# Patient Record
Sex: FEMALE | Race: WHITE | NOT HISPANIC OR LATINO | Employment: OTHER | URBAN - METROPOLITAN AREA
[De-identification: names, ages, dates, MRNs, and addresses within clinical notes are randomized per-mention and may not be internally consistent; named-entity substitution may affect disease eponyms.]

---

## 2017-01-16 RX ORDER — FLUTICASONE PROPIONATE 50 MCG
SPRAY, SUSPENSION (ML) NASAL
Qty: 48 G | Refills: 0 | Status: SHIPPED | OUTPATIENT
Start: 2017-01-16 | End: 2021-10-12 | Stop reason: SDUPTHER

## 2018-03-08 ENCOUNTER — CONVERSION ENCOUNTER (OUTPATIENT)
Dept: FAMILY MEDICINE CLINIC | Facility: CLINIC | Age: 53
End: 2018-03-08

## 2018-03-08 ENCOUNTER — OFFICE VISIT CONVERTED (OUTPATIENT)
Dept: FAMILY MEDICINE CLINIC | Facility: CLINIC | Age: 53
End: 2018-03-08
Attending: FAMILY MEDICINE

## 2018-04-12 ENCOUNTER — OFFICE VISIT CONVERTED (OUTPATIENT)
Dept: FAMILY MEDICINE CLINIC | Facility: CLINIC | Age: 53
End: 2018-04-12
Attending: FAMILY MEDICINE

## 2018-04-26 ENCOUNTER — OFFICE VISIT CONVERTED (OUTPATIENT)
Dept: FAMILY MEDICINE CLINIC | Facility: CLINIC | Age: 53
End: 2018-04-26
Attending: FAMILY MEDICINE

## 2018-04-26 ENCOUNTER — OFFICE VISIT CONVERTED (OUTPATIENT)
Dept: OTOLARYNGOLOGY | Facility: CLINIC | Age: 53
End: 2018-04-26
Attending: OTOLARYNGOLOGY

## 2018-04-26 ENCOUNTER — CONVERSION ENCOUNTER (OUTPATIENT)
Dept: OTOLARYNGOLOGY | Facility: CLINIC | Age: 53
End: 2018-04-26

## 2018-04-26 ENCOUNTER — CONVERSION ENCOUNTER (OUTPATIENT)
Dept: FAMILY MEDICINE CLINIC | Facility: CLINIC | Age: 53
End: 2018-04-26

## 2018-05-08 ENCOUNTER — OFFICE VISIT CONVERTED (OUTPATIENT)
Dept: PODIATRY | Facility: CLINIC | Age: 53
End: 2018-05-08
Attending: PODIATRIST

## 2018-05-31 ENCOUNTER — OFFICE VISIT CONVERTED (OUTPATIENT)
Dept: FAMILY MEDICINE CLINIC | Facility: CLINIC | Age: 53
End: 2018-05-31
Attending: FAMILY MEDICINE

## 2018-05-31 ENCOUNTER — CONVERSION ENCOUNTER (OUTPATIENT)
Dept: FAMILY MEDICINE CLINIC | Facility: CLINIC | Age: 53
End: 2018-05-31

## 2018-06-28 ENCOUNTER — OFFICE VISIT CONVERTED (OUTPATIENT)
Dept: FAMILY MEDICINE CLINIC | Facility: CLINIC | Age: 53
End: 2018-06-28
Attending: FAMILY MEDICINE

## 2018-09-24 ENCOUNTER — CONVERSION ENCOUNTER (OUTPATIENT)
Dept: FAMILY MEDICINE CLINIC | Facility: CLINIC | Age: 53
End: 2018-09-24

## 2018-09-24 ENCOUNTER — OFFICE VISIT CONVERTED (OUTPATIENT)
Dept: FAMILY MEDICINE CLINIC | Facility: CLINIC | Age: 53
End: 2018-09-24
Attending: FAMILY MEDICINE

## 2018-10-23 ENCOUNTER — OFFICE VISIT CONVERTED (OUTPATIENT)
Dept: FAMILY MEDICINE CLINIC | Facility: CLINIC | Age: 53
End: 2018-10-23
Attending: NURSE PRACTITIONER

## 2018-10-25 ENCOUNTER — OFFICE VISIT CONVERTED (OUTPATIENT)
Dept: FAMILY MEDICINE CLINIC | Facility: CLINIC | Age: 53
End: 2018-10-25
Attending: FAMILY MEDICINE

## 2018-10-25 ENCOUNTER — CONVERSION ENCOUNTER (OUTPATIENT)
Dept: OTHER | Facility: HOSPITAL | Age: 53
End: 2018-10-25

## 2018-11-07 ENCOUNTER — OFFICE VISIT CONVERTED (OUTPATIENT)
Dept: ORTHOPEDIC SURGERY | Facility: CLINIC | Age: 53
End: 2018-11-07
Attending: ORTHOPAEDIC SURGERY

## 2018-11-07 ENCOUNTER — CONVERSION ENCOUNTER (OUTPATIENT)
Dept: ORTHOPEDIC SURGERY | Facility: CLINIC | Age: 53
End: 2018-11-07

## 2018-11-26 ENCOUNTER — TRANSCRIBE ORDERS (OUTPATIENT)
Dept: ADMINISTRATIVE | Facility: HOSPITAL | Age: 53
End: 2018-11-26

## 2018-11-26 DIAGNOSIS — R91.1 PULMONARY NODULE: Primary | ICD-10-CM

## 2018-12-12 ENCOUNTER — OFFICE VISIT CONVERTED (OUTPATIENT)
Dept: ORTHOPEDIC SURGERY | Facility: CLINIC | Age: 53
End: 2018-12-12
Attending: ORTHOPAEDIC SURGERY

## 2018-12-12 ENCOUNTER — CONVERSION ENCOUNTER (OUTPATIENT)
Dept: ORTHOPEDIC SURGERY | Facility: CLINIC | Age: 53
End: 2018-12-12

## 2019-01-08 ENCOUNTER — HOSPITAL ENCOUNTER (OUTPATIENT)
Dept: OTHER | Facility: HOSPITAL | Age: 54
Discharge: HOME | End: 2019-01-08
Attending: ORTHOPAEDIC SURGERY

## 2019-01-11 ENCOUNTER — CONVERSION ENCOUNTER (OUTPATIENT)
Dept: ORTHOPEDIC SURGERY | Facility: CLINIC | Age: 54
End: 2019-01-11

## 2019-01-11 ENCOUNTER — OFFICE VISIT CONVERTED (OUTPATIENT)
Dept: ORTHOPEDIC SURGERY | Facility: CLINIC | Age: 54
End: 2019-01-11
Attending: ORTHOPAEDIC SURGERY

## 2019-01-18 ENCOUNTER — APPOINTMENT (OUTPATIENT)
Dept: CT IMAGING | Facility: HOSPITAL | Age: 54
End: 2019-01-18
Attending: INTERNAL MEDICINE

## 2019-01-21 ENCOUNTER — HOSPITAL ENCOUNTER (OUTPATIENT)
Dept: CT IMAGING | Facility: HOSPITAL | Age: 54
Discharge: HOME | End: 2019-01-21
Attending: INTERNAL MEDICINE | Admitting: INTERNAL MEDICINE

## 2019-01-21 DIAGNOSIS — R91.1 PULMONARY NODULE: ICD-10-CM

## 2019-01-21 PROCEDURE — 71250 CT THORAX DX C-: CPT

## 2019-01-28 ENCOUNTER — TRANSCRIBE ORDERS (OUTPATIENT)
Dept: ADMINISTRATIVE | Facility: HOSPITAL | Age: 54
End: 2019-01-28

## 2019-01-28 DIAGNOSIS — R91.8 PULMONARY NODULES: Primary | ICD-10-CM

## 2019-03-17 ENCOUNTER — HOSPITAL ENCOUNTER (OUTPATIENT)
Dept: URGENT CARE | Facility: CLINIC | Age: 54
Discharge: HOME | End: 2019-03-17

## 2019-04-16 ENCOUNTER — HOSPITAL ENCOUNTER (OUTPATIENT)
Dept: FAMILY MEDICINE CLINIC | Facility: CLINIC | Age: 54
Discharge: HOME | End: 2019-04-16
Attending: FAMILY MEDICINE

## 2019-04-16 ENCOUNTER — CONVERSION ENCOUNTER (OUTPATIENT)
Dept: FAMILY MEDICINE CLINIC | Facility: CLINIC | Age: 54
End: 2019-04-16

## 2019-04-16 ENCOUNTER — OFFICE VISIT CONVERTED (OUTPATIENT)
Dept: FAMILY MEDICINE CLINIC | Facility: CLINIC | Age: 54
End: 2019-04-16
Attending: FAMILY MEDICINE

## 2019-04-16 LAB
ALBUMIN SERPL-MCNC: 4.9 G/DL (ref 3.5–5)
ALBUMIN/GLOB SERPL: 1.6 {RATIO} (ref 1.4–2.6)
ALP SERPL-CCNC: 105 U/L (ref 53–141)
ALT SERPL-CCNC: 43 U/L (ref 10–40)
ANION GAP SERPL CALC-SCNC: 21 MMOL/L (ref 8–19)
AST SERPL-CCNC: 37 U/L (ref 15–50)
BASOPHILS # BLD AUTO: 0.03 10*3/UL (ref 0–0.2)
BASOPHILS NFR BLD AUTO: 0.4 % (ref 0–3)
BILIRUB SERPL-MCNC: 0.63 MG/DL (ref 0.2–1.3)
BUN SERPL-MCNC: 10 MG/DL (ref 5–25)
BUN/CREAT SERPL: 11 {RATIO} (ref 6–20)
CALCIUM SERPL-MCNC: 9.7 MG/DL (ref 8.7–10.4)
CHLORIDE SERPL-SCNC: 102 MMOL/L (ref 99–111)
CHOLEST SERPL-MCNC: 217 MG/DL (ref 107–200)
CHOLEST/HDLC SERPL: 4.5 {RATIO} (ref 3–6)
CONV ABS IMM GRAN: 0.03 10*3/UL (ref 0–0.2)
CONV CO2: 25 MMOL/L (ref 22–32)
CONV IMMATURE GRAN: 0.4 % (ref 0–1.8)
CONV TOTAL PROTEIN: 7.9 G/DL (ref 6.3–8.2)
CREAT UR-MCNC: 0.9 MG/DL (ref 0.5–0.9)
DEPRECATED RDW RBC AUTO: 46.9 FL (ref 36.4–46.3)
EOSINOPHIL # BLD AUTO: 0.13 10*3/UL (ref 0–0.7)
EOSINOPHIL # BLD AUTO: 1.6 % (ref 0–7)
ERYTHROCYTE [DISTWIDTH] IN BLOOD BY AUTOMATED COUNT: 14.5 % (ref 11.7–14.4)
EST. AVERAGE GLUCOSE BLD GHB EST-MCNC: 137 MG/DL
FOLATE SERPL-MCNC: 11.7 NG/ML (ref 4.8–20)
GFR SERPLBLD BASED ON 1.73 SQ M-ARVRAT: >60 ML/MIN/{1.73_M2}
GLOBULIN UR ELPH-MCNC: 3 G/DL (ref 2–3.5)
GLUCOSE SERPL-MCNC: 112 MG/DL (ref 65–99)
HBA1C MFR BLD: 13.7 G/DL (ref 12–16)
HBA1C MFR BLD: 6.4 % (ref 3.5–5.7)
HCT VFR BLD AUTO: 42.7 % (ref 37–47)
HDLC SERPL-MCNC: 48 MG/DL (ref 40–60)
IRON SATN MFR SERPL: 15 % (ref 20–55)
IRON SERPL-MCNC: 74 UG/DL (ref 60–170)
LDLC SERPL CALC-MCNC: 115 MG/DL (ref 70–100)
LYMPHOCYTES # BLD AUTO: 1.11 10*3/UL (ref 1–5)
MAGNESIUM SERPL-MCNC: 1.93 MG/DL (ref 1.6–2.3)
MCH RBC QN AUTO: 28.5 PG (ref 27–31)
MCHC RBC AUTO-ENTMCNC: 32.1 G/DL (ref 33–37)
MCV RBC AUTO: 89 FL (ref 81–99)
MONOCYTES # BLD AUTO: 0.6 10*3/UL (ref 0.2–1.2)
MONOCYTES NFR BLD AUTO: 7.4 % (ref 3–10)
NEUTROPHILS # BLD AUTO: 6.19 10*3/UL (ref 2–8)
NEUTROPHILS NFR BLD AUTO: 76.5 % (ref 30–85)
NRBC CBCN: 0 % (ref 0–0.7)
OSMOLALITY SERPL CALC.SUM OF ELEC: 298 MOSM/KG (ref 273–304)
PLATELET # BLD AUTO: 237 10*3/UL (ref 130–400)
PMV BLD AUTO: 10.1 FL (ref 9.4–12.3)
POTASSIUM SERPL-SCNC: 3.6 MMOL/L (ref 3.5–5.3)
RBC # BLD AUTO: 4.8 10*6/UL (ref 4.2–5.4)
SODIUM SERPL-SCNC: 144 MMOL/L (ref 135–147)
T4 FREE SERPL-MCNC: 1.3 NG/DL (ref 0.9–1.8)
TIBC SERPL-MCNC: 499 UG/DL (ref 245–450)
TRANSFERRIN SERPL-MCNC: 349 MG/DL (ref 250–380)
TRIGL SERPL-MCNC: 271 MG/DL (ref 40–150)
TSH SERPL-ACNC: 1.71 M[IU]/L (ref 0.27–4.2)
VARIANT LYMPHS NFR BLD MANUAL: 13.7 % (ref 20–45)
VIT B12 SERPL-MCNC: 728 PG/ML (ref 211–911)
VLDLC SERPL-MCNC: 54 MG/DL (ref 5–37)
WBC # BLD AUTO: 8.09 10*3/UL (ref 4.8–10.8)

## 2019-04-18 LAB — BACTERIA SPEC AEROBE CULT: NORMAL

## 2019-04-22 ENCOUNTER — HOSPITAL ENCOUNTER (OUTPATIENT)
Dept: CARDIOLOGY | Facility: HOSPITAL | Age: 54
Discharge: HOME | End: 2019-04-22
Attending: FAMILY MEDICINE

## 2019-05-06 ENCOUNTER — OFFICE VISIT CONVERTED (OUTPATIENT)
Dept: FAMILY MEDICINE CLINIC | Facility: CLINIC | Age: 54
End: 2019-05-06
Attending: FAMILY MEDICINE

## 2019-05-06 ENCOUNTER — HOSPITAL ENCOUNTER (OUTPATIENT)
Dept: FAMILY MEDICINE CLINIC | Facility: CLINIC | Age: 54
Discharge: HOME | End: 2019-05-06
Attending: FAMILY MEDICINE

## 2019-05-08 LAB
ALBUMIN SERPL-MCNC: 4.7 G/DL (ref 3.5–5)
ALBUMIN/GLOB SERPL: 1.6 {RATIO} (ref 1.4–2.6)
ALP SERPL-CCNC: 100 U/L (ref 53–141)
ALT SERPL-CCNC: 28 U/L (ref 10–40)
ANION GAP SERPL CALC-SCNC: 19 MMOL/L (ref 8–19)
AST SERPL-CCNC: 26 U/L (ref 15–50)
BILIRUB SERPL-MCNC: 0.45 MG/DL (ref 0.2–1.3)
BUN SERPL-MCNC: 17 MG/DL (ref 5–25)
BUN/CREAT SERPL: 21 {RATIO} (ref 6–20)
CALCIUM SERPL-MCNC: 10 MG/DL (ref 8.7–10.4)
CHLORIDE SERPL-SCNC: 102 MMOL/L (ref 99–111)
CONV CO2: 27 MMOL/L (ref 22–32)
CONV HEPATITIS B SURFACE AG W CONFIRMATION RE: NEGATIVE
CONV TOTAL PROTEIN: 7.7 G/DL (ref 6.3–8.2)
CREAT UR-MCNC: 0.81 MG/DL (ref 0.5–0.9)
GFR SERPLBLD BASED ON 1.73 SQ M-ARVRAT: >60 ML/MIN/{1.73_M2}
GLOBULIN UR ELPH-MCNC: 3 G/DL (ref 2–3.5)
GLUCOSE SERPL-MCNC: 103 MG/DL (ref 65–99)
HAV IGM SERPL QL IA: NEGATIVE
HBV CORE IGM SERPL QL IA: NEGATIVE
HCV AB SER DONR QL: <0.1 S/CO RATIO (ref 0–0.9)
OSMOLALITY SERPL CALC.SUM OF ELEC: 300 MOSM/KG (ref 273–304)
POTASSIUM SERPL-SCNC: 4.1 MMOL/L (ref 3.5–5.3)
SODIUM SERPL-SCNC: 144 MMOL/L (ref 135–147)

## 2019-08-20 ENCOUNTER — OFFICE VISIT CONVERTED (OUTPATIENT)
Dept: FAMILY MEDICINE CLINIC | Facility: CLINIC | Age: 54
End: 2019-08-20
Attending: NURSE PRACTITIONER

## 2019-08-20 ENCOUNTER — HOSPITAL ENCOUNTER (OUTPATIENT)
Dept: FAMILY MEDICINE CLINIC | Facility: CLINIC | Age: 54
Discharge: HOME | End: 2019-08-20
Attending: NURSE PRACTITIONER

## 2019-08-22 LAB — BACTERIA SPEC AEROBE CULT: NORMAL

## 2019-09-24 ENCOUNTER — OFFICE VISIT CONVERTED (OUTPATIENT)
Dept: CARDIOLOGY | Facility: CLINIC | Age: 54
End: 2019-09-24
Attending: INTERNAL MEDICINE

## 2019-10-07 ENCOUNTER — OFFICE VISIT CONVERTED (OUTPATIENT)
Dept: FAMILY MEDICINE CLINIC | Facility: CLINIC | Age: 54
End: 2019-10-07
Attending: FAMILY MEDICINE

## 2020-05-21 ENCOUNTER — OFFICE VISIT CONVERTED (OUTPATIENT)
Dept: FAMILY MEDICINE CLINIC | Facility: CLINIC | Age: 55
End: 2020-05-21
Attending: FAMILY MEDICINE

## 2020-05-21 ENCOUNTER — HOSPITAL ENCOUNTER (OUTPATIENT)
Dept: FAMILY MEDICINE CLINIC | Facility: CLINIC | Age: 55
Discharge: HOME | End: 2020-05-21
Attending: FAMILY MEDICINE

## 2020-05-21 ENCOUNTER — CONVERSION ENCOUNTER (OUTPATIENT)
Dept: FAMILY MEDICINE CLINIC | Facility: CLINIC | Age: 55
End: 2020-05-21

## 2020-05-22 LAB
ALBUMIN SERPL-MCNC: 5.3 G/DL (ref 3.5–5)
ALBUMIN/GLOB SERPL: 2.2 {RATIO} (ref 1.4–2.6)
ALP SERPL-CCNC: 77 U/L (ref 53–141)
ALT SERPL-CCNC: 61 U/L (ref 10–40)
ANION GAP SERPL CALC-SCNC: 22 MMOL/L (ref 8–19)
AST SERPL-CCNC: 61 U/L (ref 15–50)
BASOPHILS # BLD AUTO: 0.07 10*3/UL (ref 0–0.2)
BASOPHILS NFR BLD AUTO: 1 % (ref 0–3)
BILIRUB SERPL-MCNC: 0.65 MG/DL (ref 0.2–1.3)
BUN SERPL-MCNC: 8 MG/DL (ref 5–25)
BUN/CREAT SERPL: 10 {RATIO} (ref 6–20)
CALCIUM SERPL-MCNC: 10.3 MG/DL (ref 8.7–10.4)
CHLORIDE SERPL-SCNC: 102 MMOL/L (ref 99–111)
CHOLEST SERPL-MCNC: 199 MG/DL (ref 107–200)
CHOLEST/HDLC SERPL: 4.4 {RATIO} (ref 3–6)
CONV ABS IMM GRAN: 0.01 10*3/UL (ref 0–0.2)
CONV CO2: 24 MMOL/L (ref 22–32)
CONV CREATININE URINE, RANDOM: 40.2 MG/DL (ref 10–300)
CONV IMMATURE GRAN: 0.1 % (ref 0–1.8)
CONV MICROALBUM.,U,RANDOM: 24.7 MG/L (ref 0–20)
CONV TOTAL PROTEIN: 7.7 G/DL (ref 6.3–8.2)
CREAT UR-MCNC: 0.78 MG/DL (ref 0.5–0.9)
DEPRECATED RDW RBC AUTO: 56.6 FL (ref 36.4–46.3)
EOSINOPHIL # BLD AUTO: 0.29 10*3/UL (ref 0–0.7)
EOSINOPHIL # BLD AUTO: 3.9 % (ref 0–7)
ERYTHROCYTE [DISTWIDTH] IN BLOOD BY AUTOMATED COUNT: 14.6 % (ref 11.7–14.4)
EST. AVERAGE GLUCOSE BLD GHB EST-MCNC: 140 MG/DL
GFR SERPLBLD BASED ON 1.73 SQ M-ARVRAT: >60 ML/MIN/{1.73_M2}
GLOBULIN UR ELPH-MCNC: 2.4 G/DL (ref 2–3.5)
GLUCOSE SERPL-MCNC: 90 MG/DL (ref 65–99)
HBA1C MFR BLD: 6.5 % (ref 3.5–5.7)
HCT VFR BLD AUTO: 51.4 % (ref 37–47)
HDLC SERPL-MCNC: 45 MG/DL (ref 40–60)
HGB BLD-MCNC: 15.6 G/DL (ref 12–16)
LDLC SERPL CALC-MCNC: 117 MG/DL (ref 70–100)
LYMPHOCYTES # BLD AUTO: 1.34 10*3/UL (ref 1–5)
LYMPHOCYTES NFR BLD AUTO: 18.2 % (ref 20–45)
MCH RBC QN AUTO: 32.2 PG (ref 27–31)
MCHC RBC AUTO-ENTMCNC: 30.4 G/DL (ref 33–37)
MCV RBC AUTO: 106.2 FL (ref 81–99)
MICROALBUMIN/CREAT UR: 61.4 MG/G{CRE} (ref 0–35)
MONOCYTES # BLD AUTO: 0.46 10*3/UL (ref 0.2–1.2)
MONOCYTES NFR BLD AUTO: 6.3 % (ref 3–10)
NEUTROPHILS # BLD AUTO: 5.18 10*3/UL (ref 2–8)
NEUTROPHILS NFR BLD AUTO: 70.5 % (ref 30–85)
NRBC CBCN: 0 % (ref 0–0.7)
OSMOLALITY SERPL CALC.SUM OF ELEC: 296 MOSM/KG (ref 273–304)
PLATELET # BLD AUTO: 264 10*3/UL (ref 130–400)
PMV BLD AUTO: 11.2 FL (ref 9.4–12.3)
POTASSIUM SERPL-SCNC: 4.1 MMOL/L (ref 3.5–5.3)
RBC # BLD AUTO: 4.84 10*6/UL (ref 4.2–5.4)
SODIUM SERPL-SCNC: 144 MMOL/L (ref 135–147)
T4 FREE SERPL-MCNC: 1.3 NG/DL (ref 0.9–1.8)
TRIGL SERPL-MCNC: 186 MG/DL (ref 40–150)
TSH SERPL-ACNC: 1.55 M[IU]/L (ref 0.27–4.2)
VLDLC SERPL-MCNC: 37 MG/DL (ref 5–37)
WBC # BLD AUTO: 7.35 10*3/UL (ref 4.8–10.8)

## 2020-06-04 ENCOUNTER — HOSPITAL ENCOUNTER (OUTPATIENT)
Dept: OTHER | Facility: HOSPITAL | Age: 55
Discharge: HOME | End: 2020-06-04
Attending: FAMILY MEDICINE

## 2020-06-04 ENCOUNTER — OFFICE VISIT CONVERTED (OUTPATIENT)
Dept: FAMILY MEDICINE CLINIC | Facility: CLINIC | Age: 55
End: 2020-06-04
Attending: FAMILY MEDICINE

## 2020-06-04 ENCOUNTER — CONVERSION ENCOUNTER (OUTPATIENT)
Dept: FAMILY MEDICINE CLINIC | Facility: CLINIC | Age: 55
End: 2020-06-04

## 2020-07-02 ENCOUNTER — OFFICE VISIT CONVERTED (OUTPATIENT)
Dept: SURGERY | Facility: CLINIC | Age: 55
End: 2020-07-02
Attending: SURGERY

## 2020-07-16 ENCOUNTER — HOSPITAL ENCOUNTER (OUTPATIENT)
Dept: PREADMISSION TESTING | Facility: HOSPITAL | Age: 55
Discharge: HOME | End: 2020-07-16
Attending: SURGERY

## 2020-07-16 LAB — SARS-COV-2 RNA SPEC QL NAA+PROBE: NOT DETECTED

## 2020-07-17 ENCOUNTER — HOSPITAL ENCOUNTER (OUTPATIENT)
Dept: PERIOP | Facility: HOSPITAL | Age: 55
Setting detail: HOSPITAL OUTPATIENT SURGERY
Discharge: HOME | End: 2020-07-17
Attending: SURGERY

## 2020-07-17 LAB
ANION GAP SERPL CALC-SCNC: 13 MMOL/L (ref 8–19)
BUN SERPL-MCNC: 20 MG/DL (ref 5–25)
BUN/CREAT SERPL: 27 {RATIO} (ref 6–20)
CALCIUM SERPL-MCNC: 10 MG/DL (ref 8.7–10.4)
CHLORIDE SERPL-SCNC: 103 MMOL/L (ref 99–111)
CONV CO2: 28 MMOL/L (ref 22–32)
CREAT UR-MCNC: 0.73 MG/DL (ref 0.5–0.9)
GFR SERPLBLD BASED ON 1.73 SQ M-ARVRAT: >60 ML/MIN/{1.73_M2}
GLUCOSE BLD-MCNC: 135 MG/DL (ref 65–99)
GLUCOSE BLD-MCNC: 147 MG/DL (ref 65–99)
GLUCOSE SERPL-MCNC: 130 MG/DL (ref 65–99)
OSMOLALITY SERPL CALC.SUM OF ELEC: 296 MOSM/KG (ref 273–304)
POTASSIUM SERPL-SCNC: 3.2 MMOL/L (ref 3.5–5.3)
SODIUM SERPL-SCNC: 141 MMOL/L (ref 135–147)

## 2020-07-29 ENCOUNTER — HOSPITAL ENCOUNTER (OUTPATIENT)
Dept: SURGERY | Facility: CLINIC | Age: 55
Discharge: HOME | End: 2020-07-29
Attending: NURSE PRACTITIONER

## 2020-07-29 ENCOUNTER — OFFICE VISIT CONVERTED (OUTPATIENT)
Dept: SURGERY | Facility: CLINIC | Age: 55
End: 2020-07-29
Attending: NURSE PRACTITIONER

## 2020-07-31 ENCOUNTER — OFFICE VISIT CONVERTED (OUTPATIENT)
Dept: FAMILY MEDICINE CLINIC | Facility: CLINIC | Age: 55
End: 2020-07-31
Attending: FAMILY MEDICINE

## 2020-07-31 ENCOUNTER — HOSPITAL ENCOUNTER (OUTPATIENT)
Dept: FAMILY MEDICINE CLINIC | Facility: CLINIC | Age: 55
Discharge: HOME | End: 2020-07-31
Attending: FAMILY MEDICINE

## 2020-07-31 ENCOUNTER — CONVERSION ENCOUNTER (OUTPATIENT)
Dept: FAMILY MEDICINE CLINIC | Facility: CLINIC | Age: 55
End: 2020-07-31

## 2020-07-31 LAB
BACTERIA SPEC AEROBE CULT: ABNORMAL
CIPROFLOXACIN SUSC ISLT: >=8
CLINDAMYCIN SUSC ISLT: 0.25
DAPTOMYCIN SUSC ISLT: 1
DOXYCYCLINE SUSC ISLT: <=0.5
ERYTHROMYCIN SUSC ISLT: >=8
GENTAMICIN SUSC ISLT: <=0.5
LEVOFLOXACIN SUSC ISLT: 4
OXACILLIN SUSC ISLT: >=4
RIFAMPIN SUSC ISLT: <=0.5
TETRACYCLINE SUSC ISLT: <=1
TIGECYCLINE SUSC ISLT: <=0.12
TMP SMX SUSC ISLT: <=10
VANCOMYCIN SUSC ISLT: 1

## 2020-08-05 ENCOUNTER — CONVERSION ENCOUNTER (OUTPATIENT)
Dept: SURGERY | Facility: CLINIC | Age: 55
End: 2020-08-05

## 2020-08-05 ENCOUNTER — OFFICE VISIT CONVERTED (OUTPATIENT)
Dept: SURGERY | Facility: CLINIC | Age: 55
End: 2020-08-05
Attending: SURGERY

## 2020-08-19 ENCOUNTER — OFFICE VISIT CONVERTED (OUTPATIENT)
Dept: ORTHOPEDIC SURGERY | Facility: CLINIC | Age: 55
End: 2020-08-19
Attending: ORTHOPAEDIC SURGERY

## 2020-09-16 ENCOUNTER — OFFICE VISIT CONVERTED (OUTPATIENT)
Dept: ORTHOPEDIC SURGERY | Facility: CLINIC | Age: 55
End: 2020-09-16
Attending: PHYSICIAN ASSISTANT

## 2020-10-02 ENCOUNTER — OFFICE VISIT (OUTPATIENT)
Dept: NEUROLOGY | Facility: CLINIC | Age: 55
End: 2020-10-02

## 2020-10-02 VITALS — DIASTOLIC BLOOD PRESSURE: 88 MMHG | HEART RATE: 95 BPM | SYSTOLIC BLOOD PRESSURE: 155 MMHG | OXYGEN SATURATION: 98 %

## 2020-10-02 DIAGNOSIS — F33.1 MODERATE RECURRENT MAJOR DEPRESSION (HCC): Primary | ICD-10-CM

## 2020-10-02 PROCEDURE — 99214 OFFICE O/P EST MOD 30 MIN: CPT | Performed by: PSYCHIATRY & NEUROLOGY

## 2020-10-02 RX ORDER — VITAMIN E 268 MG
400 CAPSULE ORAL DAILY
COMMUNITY

## 2020-10-02 RX ORDER — DULOXETIN HYDROCHLORIDE 60 MG/1
60 CAPSULE, DELAYED RELEASE ORAL DAILY
COMMUNITY
End: 2022-01-01 | Stop reason: SDUPTHER

## 2020-10-02 RX ORDER — PANTOPRAZOLE SODIUM 40 MG/1
40 TABLET, DELAYED RELEASE ORAL 2 TIMES DAILY
COMMUNITY
End: 2021-10-12

## 2020-10-02 RX ORDER — IBUPROFEN 800 MG/1
800 TABLET ORAL EVERY 6 HOURS PRN
COMMUNITY
End: 2022-01-01

## 2020-10-02 RX ORDER — GLUCOSAMINE/D3/BOSWELLIA SERRA 1500MG-400
TABLET ORAL
COMMUNITY

## 2020-10-02 RX ORDER — OMEGA-3 FATTY ACIDS/FISH OIL 300-1000MG
1000 CAPSULE ORAL DAILY
COMMUNITY

## 2020-10-02 RX ORDER — LUBIPROSTONE 24 UG/1
24 CAPSULE ORAL 2 TIMES DAILY WITH MEALS
COMMUNITY
End: 2021-10-12 | Stop reason: SDUPTHER

## 2020-10-02 NOTE — PROGRESS NOTES
"Chief Complaint   Patient presents with   • Migraine   • Seizures       Patient ID: Lidia Yenug is a 54 y.o. female.    HPI: I had the pleasure of seeing Steven in the neurology clinic this afternoon.  As you may know she is a 54-year-old female here for history of memory issues.  She has been seen by me in the past.  This was back in 2016 or so.  At that time she was experiencing memory issues as well.  She did have neuropsych testing that we scheduled her for back then.  This did not show any evidence for dementia.  In fact her memory apparently was good.  I did review that report today.  It does show evidence for significant psychological issues.  The patient states that she does have a mental health specialist as well as a psychiatrist.  She continues to have issues related to depression.  She acknowledges a past of suicidal ideation.  She has been hospitalized for that previously.  She is able to recall multiple things in her recent and remote past during our conversation together today.  No recent head injuries.  She says that she recently moved to [place] and is now back.  She was there in order to \"get off of OxyContin\".  She says that she still experiences \"gaps in my head\".  She says that she has poor focus and concentration and she easily loses track of time.  No family history of dementia.    The following portions of the patient's history were reviewed and updated as appropriate: allergies, current medications, past family history, past medical history, past social history, past surgical history and problem list.    Review of Systems   Constitutional: Negative for activity change, appetite change and fatigue.   HENT: Negative for hearing loss, trouble swallowing and voice change.    Eyes: Negative for photophobia, redness and visual disturbance.   Respiratory: Negative for chest tightness, shortness of breath and wheezing.    Cardiovascular: Negative for chest pain, palpitations and leg swelling. "   Gastrointestinal: Negative for abdominal pain, nausea and vomiting.   Endocrine: Negative for cold intolerance, heat intolerance and polyphagia.   Musculoskeletal: Positive for gait problem. Negative for back pain and neck pain.   Skin: Negative for color change, rash and wound.   Allergic/Immunologic: Negative for environmental allergies, food allergies and immunocompromised state.   Neurological: Positive for seizures, weakness, numbness and headaches. Negative for dizziness, tremors, syncope, facial asymmetry, speech difficulty and light-headedness.   Hematological: Negative for adenopathy. Does not bruise/bleed easily.   Psychiatric/Behavioral: Positive for confusion and decreased concentration. Negative for agitation, behavioral problems, dysphoric mood, hallucinations, self-injury, sleep disturbance and suicidal ideas. The patient is nervous/anxious. The patient is not hyperactive.       I have reviewed the review of systems above performed by my medical assistant.      Vitals:    10/02/20 1458   BP: 155/88   Pulse: 95   SpO2: 98%       Neurologic Exam     Mental Status   Oriented to person, place, and time.   Registration: recalls 3 of 3 objects. Follows 3 step commands.   Attention: normal. Concentration: normal.   Speech: speech is normal   Level of consciousness: alert  Knowledge: consistent with education (No deficits found.).   Normal comprehension.     Cranial Nerves     CN II   Visual fields full to confrontation.     CN III, IV, VI   Pupils are equal, round, and reactive to light.  Extraocular motions are normal.   CN III: no CN III palsy  CN VI: no CN VI palsy  Nystagmus: none   Diplopia: none    CN V   Facial sensation intact.     CN VII   Facial expression full, symmetric.     CN VIII   CN VIII normal.     CN IX, X   CN IX normal.   CN X normal.     CN XI   CN XI normal.     CN XII   CN XII normal.     Motor Exam   Muscle bulk: normal  Right arm tone: normal  Left arm tone: normal  Right leg  tone: normal  Left leg tone: normal    Strength   Right neck flexion: 5/5  Left neck flexion: 5/5  Right neck extension: 5/5  Left neck extension: 5/5  Right deltoid: 5/5  Left deltoid: 5/5  Right biceps: 5/5  Left biceps: 5/5  Right triceps: 5/5  Left triceps: 5/5  Right wrist flexion: 5/5  Left wrist flexion: 5/5  Right wrist extension: 5/5  Left wrist extension: 5/5  Right interossei: 5/5  Left interossei: 5/5  Right abdominals: 5/5  Left abdominals: 5/5  Right iliopsoas: 5/5  Left iliopsoas: 5/5  Right quadriceps: 5/5  Left quadriceps: 5/5  Right hamstrin/5  Left hamstrin/5  Right glutei: 5/5  Left glutei: 5/5  Right anterior tibial: 5/5  Left anterior tibial: 5/5  Right posterior tibial: 5/5  Left posterior tibial: 5/5  Right peroneal: 5/5  Left peroneal: 5/5  Right gastroc: 5/5  Left gastroc: 5/5    Sensory Exam   Light touch normal.   Vibration normal.   Proprioception normal.   Pinprick normal.     Gait, Coordination, and Reflexes     Gait  Gait: normal    Coordination   Romberg: negative    Tremor   Resting tremor: absent  Intention tremor: absent    Reflexes   Right brachioradialis: 2+  Left brachioradialis: 2+  Right biceps: 2+  Left biceps: 2+  Right triceps: 2+  Left triceps: 2+  Right patellar: 2+  Left patellar: 2+  Right achilles: 2+  Left achilles: 2+  Right : 2+  Left : 2+Station is normal.       Physical Exam  Vitals signs reviewed.   Constitutional:       General: She is not in acute distress.     Appearance: She is well-developed.   HENT:      Head: Normocephalic and atraumatic.   Eyes:      Extraocular Movements: EOM normal.      Pupils: Pupils are equal, round, and reactive to light.   Neck:      Musculoskeletal: Normal range of motion.   Cardiovascular:      Rate and Rhythm: Normal rate and regular rhythm.      Heart sounds: Normal heart sounds.   Pulmonary:      Effort: Pulmonary effort is normal. No respiratory distress.      Breath sounds: Normal breath sounds.   Abdominal:       General: Bowel sounds are normal. There is no distension.      Palpations: Abdomen is soft.      Tenderness: There is no abdominal tenderness.   Musculoskeletal:         General: No deformity.   Skin:     General: Skin is warm.      Findings: No rash.   Neurological:      Mental Status: She is oriented to person, place, and time.      Coordination: Romberg Test normal.      Gait: Gait is intact.      Deep Tendon Reflexes:      Reflex Scores:       Tricep reflexes are 2+ on the right side and 2+ on the left side.       Bicep reflexes are 2+ on the right side and 2+ on the left side.       Brachioradialis reflexes are 2+ on the right side and 2+ on the left side.       Patellar reflexes are 2+ on the right side and 2+ on the left side.       Achilles reflexes are 2+ on the right side and 2+ on the left side.  Psychiatric:         Speech: Speech normal.         Judgment: Judgment normal.         Procedures    Assessment/Plan: I have assured Lidia once again that she likely is not experiencing any progressive neurocognitive issues.  We did discuss referral to a mental health specialist.  She does agree with that and so we will make the referral for her.  We will see her on an as-needed basis.       Lidia was seen today for migraine and seizures.    Diagnoses and all orders for this visit:    Moderate recurrent major depression (CMS/HCC)  -     Ambulatory Referral to Behavioral Health           Jayden Everett II, MD

## 2020-10-06 ENCOUNTER — HOSPITAL ENCOUNTER (OUTPATIENT)
Dept: OTHER | Facility: HOSPITAL | Age: 55
Setting detail: RECURRING SERIES
Discharge: HOME | End: 2020-12-02
Attending: ORTHOPAEDIC SURGERY

## 2020-11-16 ENCOUNTER — OFFICE VISIT CONVERTED (OUTPATIENT)
Dept: FAMILY MEDICINE CLINIC | Facility: CLINIC | Age: 55
End: 2020-11-16
Attending: FAMILY MEDICINE

## 2021-01-21 ENCOUNTER — HOSPITAL ENCOUNTER (OUTPATIENT)
Dept: FAMILY MEDICINE CLINIC | Facility: CLINIC | Age: 56
Discharge: HOME | End: 2021-01-21
Attending: NURSE PRACTITIONER

## 2021-01-21 ENCOUNTER — CONVERSION ENCOUNTER (OUTPATIENT)
Dept: FAMILY MEDICINE CLINIC | Facility: CLINIC | Age: 56
End: 2021-01-21

## 2021-01-21 ENCOUNTER — OFFICE VISIT CONVERTED (OUTPATIENT)
Dept: FAMILY MEDICINE CLINIC | Facility: CLINIC | Age: 56
End: 2021-01-21
Attending: NURSE PRACTITIONER

## 2021-01-21 LAB
BASOPHILS # BLD AUTO: 0.04 10*3/UL (ref 0–0.2)
BASOPHILS NFR BLD AUTO: 0.5 % (ref 0–3)
CONV ABS IMM GRAN: 0.03 10*3/UL (ref 0–0.2)
CONV CREATININE URINE, RANDOM: 94 MG/DL (ref 10–300)
CONV IMMATURE GRAN: 0.4 % (ref 0–1.8)
CONV MICROALBUM.,U,RANDOM: <12 MG/L (ref 0–20)
DEPRECATED RDW RBC AUTO: 47.7 FL (ref 36.4–46.3)
EOSINOPHIL # BLD AUTO: 0.1 10*3/UL (ref 0–0.7)
EOSINOPHIL # BLD AUTO: 1.3 % (ref 0–7)
ERYTHROCYTE [DISTWIDTH] IN BLOOD BY AUTOMATED COUNT: 13.3 % (ref 11.7–14.4)
EST. AVERAGE GLUCOSE BLD GHB EST-MCNC: 131 MG/DL
HBA1C MFR BLD: 6.2 % (ref 3.5–5.7)
HCT VFR BLD AUTO: 44.9 % (ref 37–47)
HGB BLD-MCNC: 14.5 G/DL (ref 12–16)
LYMPHOCYTES # BLD AUTO: 1.36 10*3/UL (ref 1–5)
LYMPHOCYTES NFR BLD AUTO: 18.1 % (ref 20–45)
MCH RBC QN AUTO: 31.7 PG (ref 27–31)
MCHC RBC AUTO-ENTMCNC: 32.3 G/DL (ref 33–37)
MCV RBC AUTO: 98.2 FL (ref 81–99)
MICROALBUMIN/CREAT UR: 12.8 MG/G{CRE} (ref 0–35)
MONOCYTES # BLD AUTO: 0.43 10*3/UL (ref 0.2–1.2)
MONOCYTES NFR BLD AUTO: 5.7 % (ref 3–10)
NEUTROPHILS # BLD AUTO: 5.56 10*3/UL (ref 2–8)
NEUTROPHILS NFR BLD AUTO: 74 % (ref 30–85)
NRBC CBCN: 0 % (ref 0–0.7)
PLATELET # BLD AUTO: 248 10*3/UL (ref 130–400)
PMV BLD AUTO: 10.4 FL (ref 9.4–12.3)
RBC # BLD AUTO: 4.57 10*6/UL (ref 4.2–5.4)
WBC # BLD AUTO: 7.52 10*3/UL (ref 4.8–10.8)

## 2021-01-22 LAB
ALBUMIN SERPL-MCNC: 4.6 G/DL (ref 3.5–5)
ALBUMIN/GLOB SERPL: 1.8 {RATIO} (ref 1.4–2.6)
ALP SERPL-CCNC: 73 U/L (ref 53–141)
ALT SERPL-CCNC: 36 U/L (ref 10–40)
ANION GAP SERPL CALC-SCNC: 13 MMOL/L (ref 8–19)
AST SERPL-CCNC: 38 U/L (ref 15–50)
BILIRUB SERPL-MCNC: 0.58 MG/DL (ref 0.2–1.3)
BUN SERPL-MCNC: 11 MG/DL (ref 5–25)
BUN/CREAT SERPL: 15 {RATIO} (ref 6–20)
CALCIUM SERPL-MCNC: 9.5 MG/DL (ref 8.7–10.4)
CHLORIDE SERPL-SCNC: 99 MMOL/L (ref 99–111)
CHOLEST SERPL-MCNC: 201 MG/DL (ref 107–200)
CHOLEST/HDLC SERPL: 4.5 {RATIO} (ref 3–6)
CONV CO2: 31 MMOL/L (ref 22–32)
CONV TOTAL PROTEIN: 7.2 G/DL (ref 6.3–8.2)
CREAT UR-MCNC: 0.71 MG/DL (ref 0.5–0.9)
GFR SERPLBLD BASED ON 1.73 SQ M-ARVRAT: >60 ML/MIN/{1.73_M2}
GLOBULIN UR ELPH-MCNC: 2.6 G/DL (ref 2–3.5)
GLUCOSE SERPL-MCNC: 94 MG/DL (ref 65–99)
HDLC SERPL-MCNC: 45 MG/DL (ref 40–60)
LDLC SERPL CALC-MCNC: 117 MG/DL (ref 70–100)
OSMOLALITY SERPL CALC.SUM OF ELEC: 289 MOSM/KG (ref 273–304)
POTASSIUM SERPL-SCNC: 3.3 MMOL/L (ref 3.5–5.3)
SODIUM SERPL-SCNC: 140 MMOL/L (ref 135–147)
T4 FREE SERPL-MCNC: 1 NG/DL (ref 0.9–1.8)
TRIGL SERPL-MCNC: 193 MG/DL (ref 40–150)
TSH SERPL-ACNC: 0.81 M[IU]/L (ref 0.27–4.2)
VLDLC SERPL-MCNC: 39 MG/DL (ref 5–37)

## 2021-01-27 ENCOUNTER — HOSPITAL ENCOUNTER (OUTPATIENT)
Dept: OTHER | Facility: HOSPITAL | Age: 56
Discharge: HOME | End: 2021-01-27
Attending: NURSE PRACTITIONER

## 2021-04-16 ENCOUNTER — OFFICE VISIT CONVERTED (OUTPATIENT)
Dept: FAMILY MEDICINE CLINIC | Facility: CLINIC | Age: 56
End: 2021-04-16
Attending: FAMILY MEDICINE

## 2021-04-16 ENCOUNTER — CONVERSION ENCOUNTER (OUTPATIENT)
Dept: FAMILY MEDICINE CLINIC | Facility: CLINIC | Age: 56
End: 2021-04-16

## 2021-04-16 ENCOUNTER — HOSPITAL ENCOUNTER (OUTPATIENT)
Dept: FAMILY MEDICINE CLINIC | Facility: CLINIC | Age: 56
Discharge: HOME | End: 2021-04-16
Attending: FAMILY MEDICINE

## 2021-04-16 LAB
ALBUMIN SERPL-MCNC: 5 G/DL (ref 3.5–5)
ALBUMIN/GLOB SERPL: 1.7 {RATIO} (ref 1.4–2.6)
ALP SERPL-CCNC: 87 U/L (ref 53–141)
ALT SERPL-CCNC: 19 U/L (ref 10–40)
ANION GAP SERPL CALC-SCNC: 27 MMOL/L (ref 8–19)
AST SERPL-CCNC: 23 U/L (ref 15–50)
BASOPHILS # BLD AUTO: 0.05 10*3/UL (ref 0–0.2)
BASOPHILS NFR BLD AUTO: 0.6 % (ref 0–3)
BILIRUB SERPL-MCNC: 0.38 MG/DL (ref 0.2–1.3)
BUN SERPL-MCNC: 14 MG/DL (ref 5–25)
BUN/CREAT SERPL: 16 {RATIO} (ref 6–20)
CALCIUM SERPL-MCNC: 10.2 MG/DL (ref 8.7–10.4)
CHLORIDE SERPL-SCNC: 103 MMOL/L (ref 99–111)
CONV ABS IMM GRAN: 0.02 10*3/UL (ref 0–0.2)
CONV CO2: 21 MMOL/L (ref 22–32)
CONV IMMATURE GRAN: 0.2 % (ref 0–1.8)
CONV TOTAL PROTEIN: 7.9 G/DL (ref 6.3–8.2)
CREAT UR-MCNC: 0.85 MG/DL (ref 0.5–0.9)
DEPRECATED RDW RBC AUTO: 43.9 FL (ref 36.4–46.3)
EOSINOPHIL # BLD AUTO: 0.26 10*3/UL (ref 0–0.7)
EOSINOPHIL # BLD AUTO: 2.9 % (ref 0–7)
ERYTHROCYTE [DISTWIDTH] IN BLOOD BY AUTOMATED COUNT: 12.5 % (ref 11.7–14.4)
EST. AVERAGE GLUCOSE BLD GHB EST-MCNC: 114 MG/DL
FOLATE SERPL-MCNC: >20 NG/ML (ref 4.8–20)
GFR SERPLBLD BASED ON 1.73 SQ M-ARVRAT: >60 ML/MIN/{1.73_M2}
GLOBULIN UR ELPH-MCNC: 2.9 G/DL (ref 2–3.5)
GLUCOSE SERPL-MCNC: 110 MG/DL (ref 65–99)
HBA1C MFR BLD: 5.6 % (ref 3.5–5.7)
HCT VFR BLD AUTO: 45.7 % (ref 37–47)
HGB BLD-MCNC: 15 G/DL (ref 12–16)
IRON SATN MFR SERPL: 19 % (ref 20–55)
IRON SERPL-MCNC: 70 UG/DL (ref 60–170)
LYMPHOCYTES # BLD AUTO: 1.39 10*3/UL (ref 1–5)
LYMPHOCYTES NFR BLD AUTO: 15.4 % (ref 20–45)
MCH RBC QN AUTO: 31.6 PG (ref 27–31)
MCHC RBC AUTO-ENTMCNC: 32.8 G/DL (ref 33–37)
MCV RBC AUTO: 96.2 FL (ref 81–99)
MONOCYTES # BLD AUTO: 0.55 10*3/UL (ref 0.2–1.2)
MONOCYTES NFR BLD AUTO: 6.1 % (ref 3–10)
NEUTROPHILS # BLD AUTO: 6.78 10*3/UL (ref 2–8)
NEUTROPHILS NFR BLD AUTO: 74.8 % (ref 30–85)
NRBC CBCN: 0 % (ref 0–0.7)
OSMOLALITY SERPL CALC.SUM OF ELEC: 305 MOSM/KG (ref 273–304)
PLATELET # BLD AUTO: 268 10*3/UL (ref 130–400)
PMV BLD AUTO: 10.4 FL (ref 9.4–12.3)
POTASSIUM SERPL-SCNC: 3.9 MMOL/L (ref 3.5–5.3)
RBC # BLD AUTO: 4.75 10*6/UL (ref 4.2–5.4)
SODIUM SERPL-SCNC: 147 MMOL/L (ref 135–147)
TIBC SERPL-MCNC: 369 UG/DL (ref 245–450)
TRANSFERRIN SERPL-MCNC: 258 MG/DL (ref 250–380)
VIT B12 SERPL-MCNC: 642 PG/ML (ref 211–911)
WBC # BLD AUTO: 9.05 10*3/UL (ref 4.8–10.8)

## 2021-04-18 LAB — BACTERIA UR CULT: NORMAL

## 2021-04-19 ENCOUNTER — HOSPITAL ENCOUNTER (OUTPATIENT)
Dept: OTHER | Facility: HOSPITAL | Age: 56
Discharge: HOME | End: 2021-04-19
Attending: FAMILY MEDICINE

## 2021-04-19 LAB
CREAT BLD-MCNC: 1 MG/DL (ref 0.6–1.4)
GFR SERPLBLD BASED ON 1.73 SQ M-ARVRAT: >60 ML/MIN/{1.73_M2}

## 2021-05-09 VITALS
DIASTOLIC BLOOD PRESSURE: 86 MMHG | SYSTOLIC BLOOD PRESSURE: 170 MMHG | TEMPERATURE: 98.2 F | OXYGEN SATURATION: 97 % | BODY MASS INDEX: 34.67 KG/M2 | HEART RATE: 118 BPM | HEIGHT: 64 IN

## 2021-05-09 VITALS
OXYGEN SATURATION: 91 % | SYSTOLIC BLOOD PRESSURE: 138 MMHG | DIASTOLIC BLOOD PRESSURE: 86 MMHG | TEMPERATURE: 98.2 F | HEART RATE: 112 BPM | BODY MASS INDEX: 33.01 KG/M2 | WEIGHT: 204.5 LBS

## 2021-05-09 VITALS
BODY MASS INDEX: 36.43 KG/M2 | TEMPERATURE: 97.2 F | SYSTOLIC BLOOD PRESSURE: 140 MMHG | OXYGEN SATURATION: 96 % | HEIGHT: 64 IN | HEART RATE: 115 BPM | DIASTOLIC BLOOD PRESSURE: 82 MMHG | WEIGHT: 213.37 LBS

## 2021-05-09 VITALS
HEART RATE: 92 BPM | BODY MASS INDEX: 35.68 KG/M2 | OXYGEN SATURATION: 92 % | WEIGHT: 209 LBS | SYSTOLIC BLOOD PRESSURE: 138 MMHG | DIASTOLIC BLOOD PRESSURE: 78 MMHG | HEIGHT: 64 IN | TEMPERATURE: 98 F

## 2021-05-09 VITALS
TEMPERATURE: 97.6 F | OXYGEN SATURATION: 92 % | DIASTOLIC BLOOD PRESSURE: 92 MMHG | DIASTOLIC BLOOD PRESSURE: 92 MMHG | BODY MASS INDEX: 36.96 KG/M2 | SYSTOLIC BLOOD PRESSURE: 152 MMHG | HEART RATE: 108 BPM | WEIGHT: 216.5 LBS | HEIGHT: 64 IN | SYSTOLIC BLOOD PRESSURE: 150 MMHG

## 2021-05-09 VITALS
DIASTOLIC BLOOD PRESSURE: 98 MMHG | HEIGHT: 64 IN | SYSTOLIC BLOOD PRESSURE: 140 MMHG | WEIGHT: 214 LBS | BODY MASS INDEX: 36.54 KG/M2 | TEMPERATURE: 97.9 F | OXYGEN SATURATION: 96 % | HEART RATE: 102 BPM

## 2021-05-09 VITALS
HEART RATE: 100 BPM | WEIGHT: 202.12 LBS | TEMPERATURE: 97.6 F | BODY MASS INDEX: 34.51 KG/M2 | OXYGEN SATURATION: 98 % | DIASTOLIC BLOOD PRESSURE: 76 MMHG | SYSTOLIC BLOOD PRESSURE: 122 MMHG | HEIGHT: 64 IN

## 2021-05-09 VITALS
SYSTOLIC BLOOD PRESSURE: 120 MMHG | DIASTOLIC BLOOD PRESSURE: 70 MMHG | TEMPERATURE: 97.8 F | HEART RATE: 91 BPM | WEIGHT: 203.5 LBS | OXYGEN SATURATION: 95 % | BODY MASS INDEX: 34.74 KG/M2 | HEIGHT: 64 IN

## 2021-05-09 VITALS
OXYGEN SATURATION: 96 % | BODY MASS INDEX: 31.72 KG/M2 | WEIGHT: 196.5 LBS | SYSTOLIC BLOOD PRESSURE: 120 MMHG | TEMPERATURE: 96 F | HEART RATE: 106 BPM | DIASTOLIC BLOOD PRESSURE: 74 MMHG

## 2021-05-09 VITALS
SYSTOLIC BLOOD PRESSURE: 140 MMHG | BODY MASS INDEX: 32.12 KG/M2 | WEIGHT: 199 LBS | DIASTOLIC BLOOD PRESSURE: 90 MMHG | HEART RATE: 94 BPM | OXYGEN SATURATION: 95 % | TEMPERATURE: 97.5 F

## 2021-05-09 VITALS
TEMPERATURE: 96.8 F | HEART RATE: 96 BPM | DIASTOLIC BLOOD PRESSURE: 100 MMHG | OXYGEN SATURATION: 96 % | WEIGHT: 201.25 LBS | SYSTOLIC BLOOD PRESSURE: 140 MMHG | BODY MASS INDEX: 32.48 KG/M2

## 2021-05-09 VITALS
TEMPERATURE: 97 F | OXYGEN SATURATION: 95 % | WEIGHT: 207.5 LBS | SYSTOLIC BLOOD PRESSURE: 132 MMHG | HEART RATE: 108 BPM | BODY MASS INDEX: 33.49 KG/M2 | DIASTOLIC BLOOD PRESSURE: 78 MMHG

## 2021-05-09 VITALS
OXYGEN SATURATION: 91 % | SYSTOLIC BLOOD PRESSURE: 110 MMHG | WEIGHT: 203 LBS | DIASTOLIC BLOOD PRESSURE: 78 MMHG | TEMPERATURE: 97.6 F | HEART RATE: 98 BPM | BODY MASS INDEX: 32.76 KG/M2

## 2021-05-09 VITALS
OXYGEN SATURATION: 92 % | TEMPERATURE: 97.1 F | DIASTOLIC BLOOD PRESSURE: 80 MMHG | WEIGHT: 204.5 LBS | BODY MASS INDEX: 33.01 KG/M2 | HEART RATE: 110 BPM | SYSTOLIC BLOOD PRESSURE: 130 MMHG

## 2021-05-09 VITALS
OXYGEN SATURATION: 96 % | WEIGHT: 205 LBS | HEART RATE: 90 BPM | DIASTOLIC BLOOD PRESSURE: 90 MMHG | TEMPERATURE: 96.9 F | BODY MASS INDEX: 33.09 KG/M2 | SYSTOLIC BLOOD PRESSURE: 140 MMHG

## 2021-05-09 VITALS
DIASTOLIC BLOOD PRESSURE: 60 MMHG | BODY MASS INDEX: 34.67 KG/M2 | OXYGEN SATURATION: 97 % | HEIGHT: 64 IN | SYSTOLIC BLOOD PRESSURE: 108 MMHG | HEART RATE: 105 BPM | TEMPERATURE: 97.9 F

## 2021-05-09 VITALS
WEIGHT: 191 LBS | SYSTOLIC BLOOD PRESSURE: 168 MMHG | TEMPERATURE: 97.3 F | DIASTOLIC BLOOD PRESSURE: 108 MMHG | OXYGEN SATURATION: 99 % | BODY MASS INDEX: 32.61 KG/M2 | HEIGHT: 64 IN | HEART RATE: 87 BPM

## 2021-05-09 VITALS — SYSTOLIC BLOOD PRESSURE: 208 MMHG | DIASTOLIC BLOOD PRESSURE: 118 MMHG

## 2021-05-09 VITALS
TEMPERATURE: 96.4 F | WEIGHT: 209.12 LBS | BODY MASS INDEX: 33.75 KG/M2 | DIASTOLIC BLOOD PRESSURE: 112 MMHG | SYSTOLIC BLOOD PRESSURE: 204 MMHG | HEART RATE: 104 BPM | OXYGEN SATURATION: 95 %

## 2021-05-10 NOTE — H&P
History and Physical      Patient Name: Lidia Yeung   Patient ID: [id number]   Sex: Female   YOB: 1965    Primary Care Provider: Rafael Mi MD   Referring Provider: Felisha PETTIT    Visit Date: August 19, 2020    Provider: Misael Beltran MD   Location: [facility]   Location Address: [address] [place] [address]  [id number]   Location Phone: [phone]          Chief Complaint  · Right Shoulder Pain  · Bilateral Hand Pain  · Left Ankle Pain      History Of Present Illness  Lidia Yeung is a 54 year old /White female who presents today to [facility].      Patient presents today with a chief complaint of right shoulder pain and bilateral hand pain. Patient presents today with a crutch and a walking boot on left foot. Patient had fell down the stairs 3 weeks ago. Patient says the pain has been going on for 3 weeks. Patient presents concerns for her left ankle as well and states she has been babying her ankle and icing it, doing the same with her hands. Patient was just feeling pain all over due to the fall down the steps but expressed her main pain was shoulder, ankle and hands. Patient does believe that her hand pain might have nothing to do with her fall though.                  Past Medical History  Anxiety; Arthritis; Asthma; Bladder Disorder; Blood Clotting Disorder; Bowel Disease; Bunion; Carpal tunnel syndrome; Chronic Sinusitis; Congestive heart failure; Corns and callus; Depression; Diabetes; Foot pain, right; GERD; Heel pain; Hyperlipemia; Hyperlipidemia; Hypertension; Hypothyroid; IBS (irritable bowel syndrome); Kidney Disease; Kidney stones; Limb Swelling; Lung disease; Migraines; Neurologic disorder; Numbness in feet; Rectal bleeding; Reflux; Seasonal allergies; Seizure disorder; Shortness of Breath; Stomach Disorder; Thyroid disorder; Type 2 diabetes mellitus; Ulcer         Past Surgical History  Arm Surgery; Back; Back surgery; C section; Carpal  tunnel surgery; Cesarian Section; Colonoscopy; Hysterectomy; Joint Surgery; Kidney; Metal implants; Neck surgery; Sinus Surgery         Medication List  alprazolam 2 mg oral tablet; Amitiza 24 mcg oral capsule; biotin 5,000 mcg oral tablet,disintegrating; calcium carbonate 600 mg calcium (1,500 mg) oral tablet; Coreg 3.125 mg oral tablet; doxycycline monohydrate 150 mg oral capsule; Farxiga 10 mg oral tablet; fenofibrate 54 mg oral tablet; ferrous sulfate 325 mg (65 mg iron) oral tablet,delayed release (DR/EC); furosemide 40 mg oral tablet; lamotrigine 25 mg oral tablet; levetiracetam 750 mg oral tablet; levothyroxine 50 mcg oral tablet; lisinopril 10 mg oral tablet; Livalo 4 mg oral tablet; metformin 500 mg oral tablet; montelukast 10 mg oral tablet; Omega 3-6-9 Complex 400-400-400 mg oral capsule; pantoprazole 40 mg oral tablet,delayed release (DR/EC); potassium chloride 20 mEq oral tablet extended release; vitamin B complex oral capsule; vitamin E 100 unit oral capsule; Xopenex HFA 45 mcg/actuation inhalation HFA aerosol inhaler         Allergy List  Duragesic; erythromycin; NSAIDS; PENICILLINS; STATINS-HMG-COA REDUCTASE INHIBITORS       Allergies Reconciled  Family Medical History  Depression; Stroke; Heart Disease; Diabetes, unspecified type; Breast Neoplasm; Bone Neoplasm, Malignant; Diabetes; Alcoholism in family; Arthrtis; Family history of Arthritis         Social History  Alcohol (Never); Alcohol Use (Current some day); Claustophobic (Unknown); lives with spouse; .; Recreational Drug Use (Never); Retired.; Tobacco (Former)         Review of Systems  · Constitutional  o Denies  o : fever, chills, weight loss  · Cardiovascular  o Denies  o : chest pain, shortness of breath  · Gastrointestinal  o Denies  o : liver disease, heartburn, nausea, blood in stools  · Genitourinary  o Denies  o : painful urination, blood in urine  · Integument  o Denies  o : rash, itching  · Neurologic  o Denies  o : headache,  "weakness, loss of consciousness  · Musculoskeletal  o Denies  o : painful, swollen joints  · Psychiatric  o Denies  o : drug/alcohol addiction, anxiety, depression      Vitals  Date Time BP Position Site L\R Cuff Size HR RR TEMP (F) WT  HT  BMI kg/m2 BSA m2 O2 Sat HC       08/19/2020 10:26 AM      80 - R   203lbs 16oz 5'  5\" 33.95 2.06 97 %          Physical Examination  · Constitutional  o Appearance  o : well developed, well-nourished, no obvious deformities present  · Head and Face  o Head  o :   § Inspection  § : normocephalic  o Face  o :   § Inspection  § : no facial lesions  · Eyes  o Conjunctivae  o : conjunctivae normal  o Sclerae  o : sclerae white  · Ears, Nose, Mouth and Throat  o Ears  o :   § External Ears  § : appearance within normal limits  § Hearing  § : intact  o Nose  o :   § External Nose  § : appearance normal  · Neck  o Inspection/Palpation  o : normal appearance  o Range of Motion  o : full range of motion  · Respiratory  o Respiratory Effort  o : breathing unlabored  o Inspection of Chest  o : normal appearance  o Auscultation of Lungs  o : no audible wheezing or rales  · Cardiovascular  o Heart  o : regular rate  · Gastrointestinal  o Abdominal Examination  o : soft and non-tender  · Skin and Subcutaneous Tissue  o General Inspection  o : intact, no rashes  · Psychiatric  o General  o : Alert and oriented x3  o Judgement and Insight  o : judgment and insight intact  o Mood and Affect  o : mood normal, affect appropriate  · Right Shoulder  o Inspection  o : Sensation grossly intact. No swelling. No skin discoloration. Full flexion and extension with pain. ROM with pain. Good tone of deltoid, biceps, triceps, wrist extensors, and wrist flexors. Pulses normal.  · Right Ankle/Foot  o Inspection  o : Swollen. Medial side of ankle is tender. No skin discoloration. ATFL. Weak ROM. Flexion and Extension with pain.   · Extremities  o Extremities  o : Bilateral hands: Sensation grossly intact. Good " wrist extensors and wrist flexors. Patient able to wiggle fingers. Patient able to make a fist. No swelling. No skin discoloration. full finger adduction, full finger abduction. Radial pulse 2+, ulnar pulse 2+.   · In Office Procedures  o View  o : AP/LATERAL  o Site  o : left, ankle   o Indication  o : Left ankle pain   o Study  o : X-rays ordered, taken in the office, and reviewed today.  o Xray  o : No acute fracture or acute malalignment is identified.          Assessment  · Bilateral Carpal Tunnel Syndrome     354.0/G56.00  · Left ankle pain     719.47/M25.572  · Arthralgia of both hands       Pain in joints of right hand     719.44/M25.541  Pain in joints of left hand     719.44/M25.542  · Right shoulder pain, unspecified chronicity     719.41/M25.511  · Left Ankle sprain     845.00/S93.409A  · Right Shoulder strain     840.9/S46.919A      Plan  · Orders  o Ankle (Left) 2 views X-Ray ([id number]-BV) - 719.47/M25.572 - 08/19/2020  · Medications  o Medications have been Reconciled  o Transition of Care or Provider Policy  · Instructions  o Dr. Beltran saw and examined the patient and agrees with plan.   o X-rays reviewed by Dr. Beltran.  o Reviewed the patient's Past Medical, Social, and Family history as well as the ROS at today's visit, no changes.  o Call or return if worsening symptoms.  o Exercise handout given.  o Follow Up in 4 weeks.   o This note was transcribed by Virginia Saul. coco  o Discussed treatment options and diagnosis with the patient. Patient showed understanding that everything cannot be fixed at one time. Patient wishes to focus on getting her ankle and shoulder better and then following up with her hands at a later time. Patient opted to do PT for her shoulder and ankle with some at home exercises. Patient opted for a brace for the carpal tunnel in her hands and some at home exercises as well. Patient will follow-up in 4 weeks.            Electronically Signed by: Virginia Saul-,  Other -Author on August 20, 2020 03:43:13 PM  Electronically Co-signed by: Misael Beltran MD -Reviewer on August 20, 2020 10:22:49 PM
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
## 2021-05-13 NOTE — PROGRESS NOTES
Progress Note      Patient Name: Lidia Yeung   Patient ID: [id number]   Sex: Female   YOB: 1965    Primary Care Provider: Rafael Mi MD   Referring Provider: Felisha PETTIT    Visit Date: September 16, 2020    Provider: Shandra Sheikh PA-C   Location: [facility]   Location Address: [address] [place] [address]  [id number]   Location Phone: [phone]          Chief Complaint  · left ankle pain  · right shoulder pain  · bilateral hand pain      History Of Present Illness  Lidia Yeung is a 54 year old /White female who presents today to [facility].      Patient is following up for right shoulder pain and left ankle pain. Patient presents today with a crutch and a walking boot on left foot. Patient had fell down the stairs 6 weeks ago. Patient says the pain has been going on for 6 weeks. Patient states pain in right biceps area.             Past Medical History  Anxiety; Arthritis; Asthma; Bladder Disorder; Blood Clotting Disorder; Bowel Disease; Bunion; Carpal tunnel syndrome; Chronic Sinusitis; Congestive heart failure; Corns and callus; Depression; Diabetes; Foot pain, right; GERD; Heel pain; Hyperlipemia; Hyperlipidemia; Hypertension; Hypothyroid; IBS (irritable bowel syndrome); Kidney Disease; Kidney stones; Limb Swelling; Lung disease; Migraines; Neurologic disorder; Numbness in feet; Rectal bleeding; Reflux; Seasonal allergies; Seizure disorder; Shortness of Breath; Stomach Disorder; Thyroid disorder; Type 2 diabetes mellitus; Ulcer         Past Surgical History  Arm Surgery; Back; Back surgery; C section; Carpal tunnel surgery; Cesarian Section; Colonoscopy; Hysterectomy; Joint Surgery; Kidney; Metal implants; Neck surgery; Sinus Surgery         Medication List  alprazolam 2 mg oral tablet; Amitiza 24 mcg oral capsule; biotin 5,000 mcg oral tablet,disintegrating; calcium carbonate 600 mg calcium (1,500 mg) oral tablet; Coreg 3.125 mg oral  "tablet; doxycycline monohydrate 150 mg oral capsule; Farxiga 10 mg oral tablet; fenofibrate 54 mg oral tablet; ferrous sulfate 325 mg (65 mg iron) oral tablet,delayed release (DR/EC); furosemide 40 mg oral tablet; lamotrigine 25 mg oral tablet; levetiracetam 750 mg oral tablet; levothyroxine 50 mcg oral tablet; lisinopril 10 mg oral tablet; Livalo 4 mg oral tablet; metformin 500 mg oral tablet; montelukast 10 mg oral tablet; Omega 3-6-9 Complex 400-400-400 mg oral capsule; pantoprazole 40 mg oral tablet,delayed release (DR/EC); potassium chloride 20 mEq oral tablet extended release; vitamin B complex oral capsule; vitamin E 100 unit oral capsule; Xopenex HFA 45 mcg/actuation inhalation HFA aerosol inhaler         Allergy List  Duragesic; erythromycin; NSAIDS; PENICILLINS; STATINS-HMG-COA REDUCTASE INHIBITORS         Family Medical History  Depression; Stroke; Heart Disease; Diabetes, unspecified type; Breast Neoplasm; Bone Neoplasm, Malignant; Diabetes; Alcoholism in family; Arthrtis; Family history of Arthritis         Social History  Alcohol (Never); Alcohol Use (Current some day); Claustophobic (Unknown); lives with spouse; .; Recreational Drug Use (Never); Retired.; Tobacco (Former)         Review of Systems  · Constitutional  o Denies  o : fever, chills, weight loss  · Cardiovascular  o Denies  o : chest pain, shortness of breath  · Gastrointestinal  o Denies  o : liver disease, heartburn, nausea, blood in stools  · Genitourinary  o Denies  o : painful urination, blood in urine  · Integument  o Denies  o : rash, itching  · Neurologic  o Denies  o : headache, weakness, loss of consciousness  · Musculoskeletal  o Denies  o : painful, swollen joints  · Psychiatric  o Denies  o : drug/alcohol addiction, anxiety, depression      Vitals  Date Time BP Position Site L\R Cuff Size HR RR TEMP (F) WT  HT  BMI kg/m2 BSA m2 O2 Sat HC       09/16/2020 02:17 PM      97 - R   200lbs 0oz 5'  5\" 33.28 2.04 93 %  "         Physical Examination  · Constitutional  o Appearance  o : well developed, well-nourished, no obvious deformities present  · Head and Face  o Head  o :   § Inspection  § : normocephalic  o Face  o :   § Inspection  § : no facial lesions  · Eyes  o Conjunctivae  o : conjunctivae normal  o Sclerae  o : sclerae white  · Ears, Nose, Mouth and Throat  o Ears  o :   § External Ears  § : appearance within normal limits  § Hearing  § : intact  o Nose  o :   § External Nose  § : appearance normal  · Neck  o Inspection/Palpation  o : normal appearance  o Range of Motion  o : full range of motion  · Respiratory  o Respiratory Effort  o : breathing unlabored  o Inspection of Chest  o : normal appearance  o Auscultation of Lungs  o : no audible wheezing or rales  · Cardiovascular  o Heart  o : regular rate  · Gastrointestinal  o Abdominal Examination  o : soft and non-tender  · Skin and Subcutaneous Tissue  o General Inspection  o : intact, no rashes  · Psychiatric  o General  o : Alert and oriented x3  o Judgement and Insight  o : judgment and insight intact  o Mood and Affect  o : mood normal, affect appropriate  · Left Ankle-Street  o Inspection  o : no swelling, no atrophy, no limping gait, ecchymosis , able to bear weight   o Palpation  o : CFL tender to palpation, No tenderness at base of the 5th Metatarsal , No tenderness of navicular, No tenderness at cuboid, No tenderness at tibiofibular synesmosis  o Range of Motion  o : full dorsiflexion, full plantarflexion, full inversion, full eversion  o Strength  o : full dorsiflexion, full plantarflexion, full inversion, full eversion  o Neurovascular  o : normal sensation, dorsal pedal pulse 2+, posterior tibialis pulse 2+  · Right Shoulder-Street  o Inspection  o : No swelling, no erythema, no ecchymosis, no atrophy, deformity of biceps   o Palpation  o : no tenderness at sternoclavicular joint, no tenderness at clavicle, no tenderness of acromioclavicular joint, no  tenderness at greater tuberosity, no tenderness at subacromial bursae, no tenderness at biciptal groove  o ROM  o : Full extension, full flexion, full abduction, full internal rotation, full external rotation, no winging of scapula, no scapular dyskinesis, full cervical ROM , full cross body adduction  o Strength  o : full extension, full flexion, full abduction, full external rotation, full internal rotation, full empty can test, full lift off test, full drop arm test  o Special Tests  o : negative neer's test, negative Hawkin's test  o Neurovascular  o : Full sensation, radial pulse 2+, ulnar pulse 2+          Assessment  · Left ankle pain     719.47/M25.572  · Arthralgia of both hands       Pain in joints of right hand     719.44/M25.541  Pain in joints of left hand     719.44/M25.542  · Right shoulder pain, unspecified chronicity     719.41/M25.511  · Ankle sprain     845.00/S93.409A      Plan  · Medications  o Medications have been Reconciled  o Transition of Care or Provider Policy  · Instructions  o Reviewed the patient's Past Medical, Social, and Family history as well as the ROS at today's visit, no changes.  o Call or return if worsening symptoms.  o Exercise handout given.  o Follow up in 6 weeks.  o Electronically Identified Patient Education Materials Provided Electronically     Transition from walking boot to brace  Prescribed physical therapy             Electronically Signed by: Shandra Sheikh PA-C -Author on September 16, 2020 02:53:09 PM

## 2021-05-13 NOTE — PROGRESS NOTES
Progress Note      Patient Name: Lidia Yeung   Patient ID: [id number]   Sex: Female   YOB: 1965    Primary Care Provider: Rafael Mi MD   Referring Provider: Felisha PETTIT    Visit Date: August 5, 2020    Provider: Jay Granado MD   Location[facility]   Location Address: [address] [place] [address]  [id number]   Location Phone: [phone]          Chief Complaint  · Follow Up Office Visit S/P Surgery      History Of Present Illness  Lidia Yueng is a 54 year old /White female who presents today for a postoperative visit. She follows-up status post excision of several back lesions. The patient had her superior lesion open up and it has been packed. Otherwise, she has been doing well. She is not reporting any unexpected signs or symptoms. No current signs of infection.       Past Medical History  Anxiety; Bladder Disorder; Blood Clotting Disorder; Bunion; Carpal tunnel syndrome; Chronic Sinusitis; Corns and callus; Depression; Diabetes; Foot pain, right; GERD; Heel pain; Hyperlipemia; Hyperlipidemia; Hypertension; Hypothyroid; IBS (irritable bowel syndrome); Kidney stones; Limb Swelling; Migraines; Numbness in feet; Rectal bleeding; Reflux; Seasonal allergies; Seizure disorder; Type 2 diabetes mellitus         Past Surgical History  Arm Surgery; Back; Back surgery; C section; Carpal tunnel surgery; Cesarian Section; Hysterectomy; Metal implants; Neck surgery; Sinus Surgery         Medication List  alprazolam 2 mg oral tablet; Amitiza 24 mcg oral capsule; biotin 5,000 mcg oral tablet,disintegrating; calcium carbonate 600 mg calcium (1,500 mg) oral tablet; Coreg 3.125 mg oral tablet; doxycycline monohydrate 150 mg oral capsule; Farxiga 10 mg oral tablet; fenofibrate 54 mg oral tablet; ferrous sulfate 325 mg (65 mg iron) oral tablet,delayed release (DR/EC); furosemide 40 mg oral tablet; lamotrigine 25 mg oral tablet; levetiracetam 750 mg oral tablet;  "levothyroxine 50 mcg oral tablet; lisinopril 10 mg oral tablet; Livalo 4 mg oral tablet; metformin 500 mg oral tablet; montelukast 10 mg oral tablet; Omega 3-6-9 Complex 400-400-400 mg oral capsule; pantoprazole 40 mg oral tablet,delayed release (DR/EC); potassium chloride 20 mEq oral tablet extended release; vitamin B complex oral capsule; vitamin E 100 unit oral capsule; Xopenex HFA 45 mcg/actuation inhalation HFA aerosol inhaler         Allergy List  Duragesic; erythromycin; STATINS-HMG-COA REDUCTASE INHIBITORS       Allergies Reconciled  Family Medical History  Depression; Stroke; Heart Disease; Breast Neoplasm; Bone Neoplasm, Malignant; Diabetes; Alcoholism in family; Arthrtis         Social History  Alcohol (Never); Alcohol Use; Claustophobic (Unknown); lives with spouse; .; Recreational Drug Use (Never); Retired.; Tobacco (Former)         Review of Systems  · Cardiovascular  o Denies  o : chest pain on exertion, shortness of breath, lower extremity swelling  · Respiratory  o Denies  o : shortness of breath, coughing up blood  · Gastrointestinal  o Denies  o : chronic abdominal pain      Vitals  Date Time BP Position Site L\R Cuff Size HR RR TEMP (F) WT  HT  BMI kg/m2 BSA m2 O2 Sat        08/05/2020 02:05 PM         200lbs 0oz 5'  5\" 33.28 2.04           Physical Examination  · Constitutional  o Appearance  o : well developed, well-nourished, alert and in no acute distress  · Head and Face  o Head  o :   § Inspection  § : no deformities or lesions  · Eyes  o Conjunctivae  o : clear  o Sclerae  o : nonicteric  · Neck  o Inspection/Palpation  o : normal appearance, no masses or tenderness, trachea midline  · Respiratory  o Respiratory Effort  o : breathing unlabored  o Inspection of Chest  o : normal appearance, no retractions  · Cardiovascular  o Heart  o : regular rate and rhythm  · Gastrointestinal  o Abdominal Examination  o :   § Abdomen  § : abdomen is soft.   · Lymphatic  o Neck  o : no " lymphadenopathy present  o Axilla  o : no lymphadenopathy present  o Groin  o : no lymphadenopathy present  · Skin and Subcutaneous Tissue  o General Inspection  o : her superior back lesion is open. It has some packing in it that was removed. She has good granulation tissue at the base. No signs of infection. Her other lesion appears to be healing well.   · Neurologic  o Cranial Nerves  o : grossly intact  o Sensation  o : grossly intact  o Gait and Station  o :   § Gait Screening  § : normal gait, able to stand without diffculty  o Cerebellar Function  o : no obvious abnormalities  · Psychiatric  o Judgement and Insight  o : judgment and insight intact  o Mood and Affect  o : mood normal, affect appropriate          Assessment  · Postoperative Exam Following Surgery     V67.00       Patient status post excision of two back lesions. One was a hemangioma and one was a sebaceous cyst.     Problems Reconciled  Plan  · Medications  o Medications have been Reconciled  o Transition of Care or Provider Policy     The patient seems to be healing appropriately. She should continue local wound care and packing for another week.  I can follow-up with her in a week or two but if she continues to progress well and heal appropriately, she doesn't necessarily need to follow-up with me, as long as she doesn't show any signs of deterioration or infection. I discussed all of this with the patient. All questions were answered.  She voiced understanding and agreed to proceed with the above plan.             Electronically Signed by: Maddi Barba-, -Author on August 6, 2020 10:56:19 AM  Electronically Co-signed by: Jay Granado MD -Reviewer on August 7, 2020 05:48:14 PM

## 2021-05-14 VITALS — BODY MASS INDEX: 33.32 KG/M2 | OXYGEN SATURATION: 93 % | HEIGHT: 65 IN | HEART RATE: 97 BPM | WEIGHT: 200 LBS

## 2021-05-14 VITALS — HEIGHT: 65 IN | OXYGEN SATURATION: 97 % | BODY MASS INDEX: 33.99 KG/M2 | WEIGHT: 204 LBS | HEART RATE: 80 BPM

## 2021-05-15 VITALS — WEIGHT: 200 LBS | HEIGHT: 65 IN | BODY MASS INDEX: 33.32 KG/M2

## 2021-05-15 VITALS — HEIGHT: 65 IN | WEIGHT: 206 LBS | RESPIRATION RATE: 12 BRPM | BODY MASS INDEX: 34.32 KG/M2

## 2021-05-15 VITALS — HEIGHT: 65 IN | RESPIRATION RATE: 12 BRPM | BODY MASS INDEX: 34.36 KG/M2 | WEIGHT: 206.25 LBS

## 2021-05-15 VITALS — BODY MASS INDEX: 33.91 KG/M2 | HEIGHT: 66 IN | HEART RATE: 80 BPM | WEIGHT: 211 LBS | OXYGEN SATURATION: 97 %

## 2021-05-15 VITALS — WEIGHT: 211 LBS | BODY MASS INDEX: 33.91 KG/M2 | HEIGHT: 66 IN

## 2021-05-15 VITALS
HEIGHT: 65 IN | HEART RATE: 95 BPM | DIASTOLIC BLOOD PRESSURE: 96 MMHG | SYSTOLIC BLOOD PRESSURE: 195 MMHG | WEIGHT: 208 LBS | BODY MASS INDEX: 34.66 KG/M2

## 2021-05-16 VITALS — BODY MASS INDEX: 33.91 KG/M2 | OXYGEN SATURATION: 97 % | WEIGHT: 211 LBS | HEIGHT: 66 IN | HEART RATE: 80 BPM

## 2021-05-16 VITALS — WEIGHT: 211 LBS | HEART RATE: 91 BPM | OXYGEN SATURATION: 93 % | HEIGHT: 66 IN | BODY MASS INDEX: 33.91 KG/M2

## 2021-05-16 VITALS
OXYGEN SATURATION: 93 % | HEART RATE: 101 BPM | HEIGHT: 66 IN | WEIGHT: 202.5 LBS | SYSTOLIC BLOOD PRESSURE: 118 MMHG | DIASTOLIC BLOOD PRESSURE: 62 MMHG | BODY MASS INDEX: 32.54 KG/M2 | RESPIRATION RATE: 16 BRPM | TEMPERATURE: 96.7 F

## 2021-06-18 RX ORDER — POTASSIUM CHLORIDE 20 MEQ/1
TABLET, EXTENDED RELEASE ORAL
Qty: 60 TABLET | Refills: 0 | Status: SHIPPED | OUTPATIENT
Start: 2021-06-18 | End: 2021-07-10

## 2021-06-18 NOTE — TELEPHONE ENCOUNTER
Patient last refill on requesting med 1/28/21.    Patient last seen 4/16/21.    Pt has no upcoming appt scheduled.

## 2021-06-28 RX ORDER — LEVETIRACETAM 750 MG/1
TABLET ORAL
Qty: 180 TABLET | Refills: 0 | Status: SHIPPED | OUTPATIENT
Start: 2021-06-28 | End: 2021-09-09

## 2021-06-28 RX ORDER — FUROSEMIDE 40 MG/1
TABLET ORAL
Qty: 90 TABLET | Refills: 0 | Status: SHIPPED | OUTPATIENT
Start: 2021-06-28 | End: 2021-08-12

## 2021-07-19 RX ORDER — POTASSIUM CHLORIDE 20 MEQ/1
20 TABLET, EXTENDED RELEASE ORAL 2 TIMES DAILY
Qty: 60 TABLET | Refills: 0 | Status: SHIPPED | OUTPATIENT
Start: 2021-07-19 | End: 2021-08-10

## 2021-07-23 RX ORDER — LEVOTHYROXINE SODIUM 0.05 MG/1
TABLET ORAL
Qty: 90 TABLET | Refills: 0 | Status: SHIPPED | OUTPATIENT
Start: 2021-07-23 | End: 2021-10-06

## 2021-08-10 RX ORDER — POTASSIUM CHLORIDE 20 MEQ/1
TABLET, EXTENDED RELEASE ORAL
Qty: 60 TABLET | Refills: 0 | Status: SHIPPED | OUTPATIENT
Start: 2021-08-10 | End: 2021-09-01

## 2021-08-12 RX ORDER — FUROSEMIDE 40 MG/1
TABLET ORAL
Qty: 90 TABLET | Refills: 0 | Status: SHIPPED | OUTPATIENT
Start: 2021-08-12 | End: 2021-10-12 | Stop reason: SDUPTHER

## 2021-08-12 RX ORDER — LISINOPRIL 20 MG/1
TABLET ORAL
Qty: 90 TABLET | Refills: 0 | Status: SHIPPED | OUTPATIENT
Start: 2021-08-12 | End: 2021-08-13 | Stop reason: SDUPTHER

## 2021-08-13 ENCOUNTER — TELEPHONE (OUTPATIENT)
Dept: FAMILY MEDICINE CLINIC | Facility: CLINIC | Age: 56
End: 2021-08-13

## 2021-08-13 DIAGNOSIS — I10 ESSENTIAL HYPERTENSION: Primary | ICD-10-CM

## 2021-08-13 RX ORDER — LISINOPRIL 20 MG/1
20 TABLET ORAL DAILY
Qty: 14 TABLET | Refills: 0 | Status: SHIPPED | OUTPATIENT
Start: 2021-08-13 | End: 2021-10-12 | Stop reason: SDUPTHER

## 2021-09-01 RX ORDER — POTASSIUM CHLORIDE 20 MEQ/1
TABLET, EXTENDED RELEASE ORAL
Qty: 60 TABLET | Refills: 0 | Status: SHIPPED | OUTPATIENT
Start: 2021-09-01 | End: 2021-09-27

## 2021-09-09 RX ORDER — LEVETIRACETAM 750 MG/1
TABLET ORAL
Qty: 180 TABLET | Refills: 0 | Status: SHIPPED | OUTPATIENT
Start: 2021-09-09 | End: 2021-11-22

## 2021-09-22 ENCOUNTER — TELEPHONE (OUTPATIENT)
Dept: FAMILY MEDICINE CLINIC | Facility: CLINIC | Age: 56
End: 2021-09-22

## 2021-09-22 NOTE — TELEPHONE ENCOUNTER
Caller: Lidia Yeung    Relationship: Self    Best call back number: [phone]    What form or medical record are you requesting: RADIOLOGY RECORDS    Who is requesting this form or medical record from you: [facility]    Additional notes: PATIENT WOULD LIKE DR CRESPO TO REACH OUT TO GET HER SCAN RECORDS SO THAT THEY CAN DISCUSS HER HAVING A FOLLOW UP SURGERY.

## 2021-09-23 NOTE — TELEPHONE ENCOUNTER
Printed prior records of xrays/imaging/office notes from [facility]. Scanned into chart.

## 2021-09-27 ENCOUNTER — TELEPHONE (OUTPATIENT)
Dept: FAMILY MEDICINE CLINIC | Facility: CLINIC | Age: 56
End: 2021-09-27

## 2021-09-27 RX ORDER — POTASSIUM CHLORIDE 20 MEQ/1
TABLET, EXTENDED RELEASE ORAL
Qty: 60 TABLET | Refills: 0 | Status: SHIPPED | OUTPATIENT
Start: 2021-09-27 | End: 2021-10-12 | Stop reason: SDUPTHER

## 2021-10-06 RX ORDER — LEVOTHYROXINE SODIUM 0.05 MG/1
TABLET ORAL
Qty: 90 TABLET | Refills: 0 | Status: SHIPPED | OUTPATIENT
Start: 2021-10-06 | End: 2021-10-12 | Stop reason: SDUPTHER

## 2021-10-12 ENCOUNTER — OFFICE VISIT (OUTPATIENT)
Dept: FAMILY MEDICINE CLINIC | Facility: CLINIC | Age: 56
End: 2021-10-12

## 2021-10-12 VITALS
TEMPERATURE: 97.5 F | SYSTOLIC BLOOD PRESSURE: 138 MMHG | DIASTOLIC BLOOD PRESSURE: 84 MMHG | BODY MASS INDEX: 34.16 KG/M2 | HEART RATE: 108 BPM | WEIGHT: 199 LBS | OXYGEN SATURATION: 97 %

## 2021-10-12 DIAGNOSIS — K58.9 IRRITABLE BOWEL SYNDROME, UNSPECIFIED TYPE: ICD-10-CM

## 2021-10-12 DIAGNOSIS — E03.9 HYPOTHYROIDISM, UNSPECIFIED TYPE: ICD-10-CM

## 2021-10-12 DIAGNOSIS — K21.9 GASTROESOPHAGEAL REFLUX DISEASE WITHOUT ESOPHAGITIS: ICD-10-CM

## 2021-10-12 DIAGNOSIS — M54.42 CHRONIC BILATERAL LOW BACK PAIN WITH BILATERAL SCIATICA: Primary | ICD-10-CM

## 2021-10-12 DIAGNOSIS — E78.2 MIXED HYPERLIPIDEMIA: ICD-10-CM

## 2021-10-12 DIAGNOSIS — G89.29 CHRONIC BILATERAL LOW BACK PAIN WITH BILATERAL SCIATICA: Primary | ICD-10-CM

## 2021-10-12 DIAGNOSIS — N63.23 UNSPECIFIED LUMP IN THE LEFT BREAST, LOWER OUTER QUADRANT: ICD-10-CM

## 2021-10-12 DIAGNOSIS — E11.65 TYPE 2 DIABETES MELLITUS WITH HYPERGLYCEMIA, WITHOUT LONG-TERM CURRENT USE OF INSULIN (HCC): ICD-10-CM

## 2021-10-12 DIAGNOSIS — N63.0 BREAST MASS IN FEMALE: ICD-10-CM

## 2021-10-12 DIAGNOSIS — M54.41 CHRONIC BILATERAL LOW BACK PAIN WITH BILATERAL SCIATICA: Primary | ICD-10-CM

## 2021-10-12 DIAGNOSIS — I10 ESSENTIAL HYPERTENSION: ICD-10-CM

## 2021-10-12 DIAGNOSIS — M19.90 ARTHRITIS: ICD-10-CM

## 2021-10-12 DIAGNOSIS — M54.2 NECK PAIN: ICD-10-CM

## 2021-10-12 DIAGNOSIS — I10 PRIMARY HYPERTENSION: ICD-10-CM

## 2021-10-12 DIAGNOSIS — J30.89 NON-SEASONAL ALLERGIC RHINITIS, UNSPECIFIED TRIGGER: ICD-10-CM

## 2021-10-12 PROCEDURE — 36415 COLL VENOUS BLD VENIPUNCTURE: CPT | Performed by: FAMILY MEDICINE

## 2021-10-12 PROCEDURE — 85025 COMPLETE CBC W/AUTO DIFF WBC: CPT | Performed by: FAMILY MEDICINE

## 2021-10-12 PROCEDURE — 83036 HEMOGLOBIN GLYCOSYLATED A1C: CPT | Performed by: FAMILY MEDICINE

## 2021-10-12 PROCEDURE — 80061 LIPID PANEL: CPT | Performed by: FAMILY MEDICINE

## 2021-10-12 PROCEDURE — 84439 ASSAY OF FREE THYROXINE: CPT | Performed by: FAMILY MEDICINE

## 2021-10-12 PROCEDURE — 84443 ASSAY THYROID STIM HORMONE: CPT | Performed by: FAMILY MEDICINE

## 2021-10-12 PROCEDURE — 99214 OFFICE O/P EST MOD 30 MIN: CPT | Performed by: FAMILY MEDICINE

## 2021-10-12 PROCEDURE — 82043 UR ALBUMIN QUANTITATIVE: CPT | Performed by: FAMILY MEDICINE

## 2021-10-12 PROCEDURE — 80053 COMPREHEN METABOLIC PANEL: CPT | Performed by: FAMILY MEDICINE

## 2021-10-12 RX ORDER — LEVOTHYROXINE SODIUM 0.05 MG/1
50 TABLET ORAL DAILY
Qty: 90 TABLET | Refills: 1 | Status: SHIPPED | OUTPATIENT
Start: 2021-10-12 | End: 2022-01-01

## 2021-10-12 RX ORDER — CELECOXIB 200 MG/1
200 CAPSULE ORAL DAILY
Qty: 90 CAPSULE | Refills: 1 | Status: SHIPPED | OUTPATIENT
Start: 2021-10-12 | End: 2022-01-01 | Stop reason: SDUPTHER

## 2021-10-12 RX ORDER — LISINOPRIL 20 MG/1
20 TABLET ORAL DAILY
Qty: 90 TABLET | Refills: 1 | Status: SHIPPED | OUTPATIENT
Start: 2021-10-12 | End: 2022-01-01 | Stop reason: SDUPTHER

## 2021-10-12 RX ORDER — GUAIFENESIN 600 MG/1
1200 TABLET, EXTENDED RELEASE ORAL 2 TIMES DAILY
Qty: 180 TABLET | Refills: 1 | Status: SHIPPED | OUTPATIENT
Start: 2021-10-12 | End: 2022-01-01 | Stop reason: SDUPTHER

## 2021-10-12 RX ORDER — FLUTICASONE PROPIONATE 50 MCG
2 SPRAY, SUSPENSION (ML) NASAL DAILY
Qty: 48 G | Refills: 1 | Status: SHIPPED | OUTPATIENT
Start: 2021-10-12 | End: 2022-01-01 | Stop reason: SDUPTHER

## 2021-10-12 RX ORDER — ALBUTEROL SULFATE 90 UG/1
2 AEROSOL, METERED RESPIRATORY (INHALATION) EVERY 4 HOURS PRN
Qty: 54 G | Refills: 1 | Status: SHIPPED | OUTPATIENT
Start: 2021-10-12 | End: 2022-01-01 | Stop reason: SDUPTHER

## 2021-10-12 RX ORDER — LANSOPRAZOLE 15 MG/1
15 CAPSULE, DELAYED RELEASE ORAL DAILY
Qty: 90 CAPSULE | Refills: 1 | Status: SHIPPED | OUTPATIENT
Start: 2021-10-12 | End: 2022-01-01 | Stop reason: SDUPTHER

## 2021-10-12 RX ORDER — LUBIPROSTONE 24 UG/1
24 CAPSULE ORAL 2 TIMES DAILY WITH MEALS
Qty: 90 CAPSULE | Refills: 1 | Status: SHIPPED | OUTPATIENT
Start: 2021-10-12 | End: 2022-01-01 | Stop reason: SDUPTHER

## 2021-10-12 RX ORDER — FUROSEMIDE 40 MG/1
40 TABLET ORAL DAILY
Qty: 90 TABLET | Refills: 1 | Status: SHIPPED | OUTPATIENT
Start: 2021-10-12 | End: 2022-01-01 | Stop reason: SDUPTHER

## 2021-10-12 RX ORDER — POTASSIUM CHLORIDE 20 MEQ/1
20 TABLET, EXTENDED RELEASE ORAL 2 TIMES DAILY
Qty: 180 TABLET | Refills: 1 | Status: SHIPPED | OUTPATIENT
Start: 2021-10-12 | End: 2021-10-18

## 2021-10-12 RX ORDER — CELECOXIB 200 MG/1
200 CAPSULE ORAL DAILY
COMMUNITY
End: 2021-10-12 | Stop reason: SDUPTHER

## 2021-10-12 NOTE — PROGRESS NOTES
Chief Complaint  Hypertension (refills), Diabetes (refills), and Hypothyroidism (refills )    Subjective          Lidia Yeung presents to [facility]  Pt has chronic neck and low back pain- needs referral back to [facility]  Pt recently found right breast mass    Hypertension  This is a chronic problem. The current episode started more than 1 year ago. The problem has been gradually improving since onset. The problem is controlled. Pertinent negatives include no anxiety, blurred vision, chest pain, headaches, malaise/fatigue, neck pain, orthopnea, palpitations, peripheral edema, PND, shortness of breath or sweats. There are no associated agents to hypertension. Risk factors for coronary artery disease include diabetes mellitus, family history, dyslipidemia and post-menopausal state. Current antihypertension treatment includes ACE inhibitors. The current treatment provides significant improvement. There are no compliance problems.    Diabetes  She presents for her follow-up diabetic visit. She has type 2 diabetes mellitus. Her disease course has been stable. There are no hypoglycemic associated symptoms. Pertinent negatives for hypoglycemia include no headaches or sweats. Associated symptoms include foot paresthesias. Pertinent negatives for diabetes include no blurred vision, no chest pain, no fatigue, no foot ulcerations, no polydipsia, no polyphagia, no polyuria, no visual change, no weakness and no weight loss. There are no hypoglycemic complications. Symptoms are stable. Diabetic complications include peripheral neuropathy. Current diabetic treatment includes oral agent (dual therapy). She is compliant with treatment all of the time. She is following a generally healthy diet. An ACE inhibitor/angiotensin II receptor blocker is being taken. Eye exam is current.   Hypothyroidism  This is a chronic problem. The current episode started today. The problem occurs  intermittently. The problem has been gradually improving. Pertinent negatives include no abdominal pain, anorexia, arthralgias, change in bowel habit, chest pain, chills, congestion, coughing, diaphoresis, fatigue, fever, headaches, joint swelling, myalgias, nausea, neck pain, numbness, rash, sore throat, swollen glands, urinary symptoms, vertigo, visual change, vomiting or weakness. Treatments tried: synthroid. The treatment provided moderate relief.       Objective   Allergies   Allergen Reactions   • Atorvastatin    • Atorvastatin Calcium Swelling   • Ciprofloxacin Itching     FEVER,CHILLS   • Erythromycin    • Fentanyl Unknown - Low Severity   • Nsaids Unknown - Low Severity   • Penicillins Unknown - Low Severity   • Rosuvastatin    • Statins Unknown - Low Severity     Immunization History   Administered Date(s) Administered   • COVID-19 (MODERNA) 05/15/2021, 06/23/2021   • FluMist 2-49yrs 10/03/2014       Vital Signs:   Vitals:    10/12/21 1633   BP: 138/84   Pulse: 108   Temp: 97.5 °F (36.4 °C)   SpO2: 97%   Weight: 90.3 kg (199 lb)       Physical Exam  Vitals reviewed. Exam conducted with a chaperone present.   Constitutional:       Appearance: Normal appearance. She is well-developed.   HENT:      Head: Normocephalic and atraumatic.      Right Ear: External ear normal.      Left Ear: External ear normal.      Mouth/Throat:      Pharynx: No oropharyngeal exudate.   Eyes:      Conjunctiva/sclera: Conjunctivae normal.      Pupils: Pupils are equal, round, and reactive to light.   Cardiovascular:      Rate and Rhythm: Normal rate and regular rhythm.      Pulses: Normal pulses.           Dorsalis pedis pulses are 2+ on the right side and 2+ on the left side.      Heart sounds: Normal heart sounds. No murmur heard.  No friction rub. No gallop.    Pulmonary:      Effort: Pulmonary effort is normal.      Breath sounds: Normal breath sounds. No wheezing or rhonchi.   Chest:   Breasts:      Walt Score is 5. Breasts  are symmetrical.      Right: Mass present. No swelling, bleeding, inverted nipple, nipple discharge, skin change, tenderness, axillary adenopathy or supraclavicular adenopathy.      Left: No swelling, bleeding, inverted nipple, mass, nipple discharge, skin change, tenderness, axillary adenopathy or supraclavicular adenopathy.        Comments: Right breast 0.25-0.5cm soft moveable mass at 8 o'clock right breast  Abdominal:      General: Bowel sounds are normal. There is no distension.      Palpations: Abdomen is soft.      Tenderness: There is no abdominal tenderness.   Musculoskeletal:      Right foot: Normal range of motion. Deformity present. No bunion, Charcot foot, foot drop or prominent metatarsal heads.      Left foot: Normal range of motion. No deformity, bunion, Charcot foot, foot drop or prominent metatarsal heads.   Feet:      Right foot:      Protective Sensation: 3 sites tested. 0 sites sensed.      Skin integrity: Skin integrity normal. No ulcer or blister.      Toenail Condition: Right toenails are normal.      Left foot:      Protective Sensation: 3 sites tested. 3 sites sensed.      Skin integrity: Skin integrity normal. No ulcer or blister.      Toenail Condition: Left toenails are normal.      Comments:      Lymphadenopathy:      Upper Body:      Right upper body: No supraclavicular, axillary or pectoral adenopathy.      Left upper body: No supraclavicular, axillary or pectoral adenopathy.   Skin:     General: Skin is warm and dry.   Neurological:      Mental Status: She is alert and oriented to person, place, and time.      Cranial Nerves: No cranial nerve deficit.   Psychiatric:         Mood and Affect: Mood and affect normal.         Behavior: Behavior normal.         Thought Content: Thought content normal.         Judgment: Judgment normal.        Result Review :   The following data was reviewed by: Rafael Mi MD on 10/12/2021:  CMP    CMP 1/21/21 4/16/21   Glucose 94 110 (A)   BUN 11 14    Creatinine 0.71 0.85   Sodium 140 147   Potassium 3.3 (A) 3.9   Chloride 99 103   Calcium 9.5 10.2   Albumin 4.6 5.0   Total Bilirubin 0.58 0.38   Alkaline Phosphatase 73 87   AST (SGOT) 38 23   ALT (SGPT) 36 19   (A) Abnormal value       Comments are available for some flowsheets but are not being displayed.           CBC    CBC 1/21/21 4/16/21 5/1/21   WBC 7.52 9.05 7.69   RBC 4.57 4.75 4.79   Hemoglobin 14.5 15.0 15.2   Hematocrit 44.9 45.7 45.1   MCV 98.2 96.2 94.2   MCH 31.7 (A) 31.6 (A) 31.7   MCHC 32.3 (A) 32.8 (A) 33.7   RDW 13.3 12.5 12.6   Platelets 248 268 258   (A) Abnormal value            Lipid Panel    Lipid Panel 1/21/21   Total Cholesterol 201 (A)   Triglycerides 193 (A)   HDL Cholesterol 45   VLDL Cholesterol 39 (A)   LDL Cholesterol  117 (A)   (A) Abnormal value       Comments are available for some flowsheets but are not being displayed.           TSH    TSH 1/21/21   TSH 0.815           Most Recent A1C    HGBA1C Most Recent 4/16/21   Hemoglobin A1C 5.6      Comments are available for some flowsheets but are not being displayed.                     Assessment and Plan    Diagnoses and all orders for this visit:    1. Chronic bilateral low back pain with bilateral sciatica (Primary)  -     Ambulatory Referral to Neurosurgery    2. Hypothyroidism, unspecified type  -     levothyroxine (SYNTHROID, LEVOTHROID) 50 MCG tablet; Take 1 tablet by mouth Daily.  Dispense: 90 tablet; Refill: 1    3. Primary hypertension  -     CBC Auto Differential  -     Comprehensive Metabolic Panel  -     TSH+Free T4  -     furosemide (LASIX) 40 MG tablet; Take 1 tablet by mouth Daily.  Dispense: 90 tablet; Refill: 1  -     lisinopril (PRINIVIL,ZESTRIL) 20 MG tablet; Take 1 tablet by mouth Daily.  Dispense: 90 tablet; Refill: 1  -     potassium chloride (K-DUR,KLOR-CON) 20 MEQ CR tablet; Take 1 tablet by mouth 2 (Two) Times a Day.  Dispense: 180 tablet; Refill: 1    4. Type 2 diabetes mellitus with hyperglycemia,  without long-term current use of insulin (HCC)  -     Hemoglobin A1c  -     MicroAlbumin, Urine, Random - Urine, Clean Catch    5. Neck pain  -     Ambulatory Referral to Neurosurgery    6. Essential hypertension    7. Mixed hyperlipidemia  -     Lipid Panel    8. Non-seasonal allergic rhinitis, unspecified trigger  -     guaiFENesin (Mucinex) 600 MG 12 hr tablet; Take 2 tablets by mouth 2 (Two) Times a Day.  Dispense: 180 tablet; Refill: 1  -     albuterol sulfate  (90 Base) MCG/ACT inhaler; Inhale 2 puffs Every 4 (Four) Hours As Needed for Wheezing.  Dispense: 54 g; Refill: 1  -     fluticasone (FLONASE) 50 MCG/ACT nasal spray; 2 sprays by Each Nare route Daily.  Dispense: 48 g; Refill: 1    9. Arthritis  -     celecoxib (CeleBREX) 200 MG capsule; Take 1 capsule by mouth Daily.  Dispense: 90 capsule; Refill: 1    10. Irritable bowel syndrome, unspecified type  -     linaclotide (LINZESS) 290 MCG capsule capsule; Take 1 capsule by mouth Every Morning Before Breakfast.  Dispense: 90 capsule; Refill: 1  -     lubiprostone (AMITIZA) 24 MCG capsule; Take 1 capsule by mouth 2 (Two) Times a Day With Meals.  Dispense: 90 capsule; Refill: 1    11. Gastroesophageal reflux disease without esophagitis  -     lansoprazole (PREVACID) 15 MG capsule; Take 1 capsule by mouth Daily.  Dispense: 90 capsule; Refill: 1    12. Breast mass in female  -     Mammo Diagnostic Bilateral With CAD; Future  -     US Breast Right Complete; Future    13. Unspecified lump in the left breast, lower outer quadrant   -     Mammo Diagnostic Bilateral With CAD; Future  -     US Breast Right Complete; Future            Follow Up   Return in about 6 months (around 4/12/2022) for Recheck.  Patient was given instructions and counseling regarding her condition or for health maintenance advice. Please see specific information pulled into the AVS if appropriate.

## 2021-10-12 NOTE — PROGRESS NOTES
Venipuncture Blood Specimen Collection  Venipuncture performed in left arm  by Cesia Allan with good hemostasis. Patient tolerated the procedure well without complications.   10/12/21   Cesia Allan

## 2021-10-13 LAB
ALBUMIN SERPL-MCNC: 5.1 G/DL (ref 3.5–5.2)
ALBUMIN UR-MCNC: <1.2 MG/DL
ALBUMIN/GLOB SERPL: 1.9 G/DL
ALP SERPL-CCNC: 92 U/L (ref 39–117)
ALT SERPL W P-5'-P-CCNC: 19 U/L (ref 1–33)
ANION GAP SERPL CALCULATED.3IONS-SCNC: 11.7 MMOL/L (ref 5–15)
AST SERPL-CCNC: 21 U/L (ref 1–32)
BASOPHILS # BLD AUTO: 0.05 10*3/MM3 (ref 0–0.2)
BASOPHILS NFR BLD AUTO: 0.5 % (ref 0–1.5)
BILIRUB SERPL-MCNC: 0.4 MG/DL (ref 0–1.2)
BUN SERPL-MCNC: 12 MG/DL (ref 6–20)
BUN/CREAT SERPL: 12.8 (ref 7–25)
CALCIUM SPEC-SCNC: 10.1 MG/DL (ref 8.6–10.5)
CHLORIDE SERPL-SCNC: 100 MMOL/L (ref 98–107)
CHOLEST SERPL-MCNC: 298 MG/DL (ref 0–200)
CO2 SERPL-SCNC: 29.3 MMOL/L (ref 22–29)
CREAT SERPL-MCNC: 0.94 MG/DL (ref 0.57–1)
DEPRECATED RDW RBC AUTO: 42.5 FL (ref 37–54)
EOSINOPHIL # BLD AUTO: 0.15 10*3/MM3 (ref 0–0.4)
EOSINOPHIL NFR BLD AUTO: 1.4 % (ref 0.3–6.2)
ERYTHROCYTE [DISTWIDTH] IN BLOOD BY AUTOMATED COUNT: 12.1 % (ref 12.3–15.4)
GFR SERPL CREATININE-BSD FRML MDRD: 61 ML/MIN/1.73
GLOBULIN UR ELPH-MCNC: 2.7 GM/DL
GLUCOSE SERPL-MCNC: 91 MG/DL (ref 65–99)
HBA1C MFR BLD: 5.89 % (ref 4.8–5.6)
HCT VFR BLD AUTO: 41.3 % (ref 34–46.6)
HDLC SERPL-MCNC: 48 MG/DL (ref 40–60)
HGB BLD-MCNC: 14.2 G/DL (ref 12–15.9)
IMM GRANULOCYTES # BLD AUTO: 0.03 10*3/MM3 (ref 0–0.05)
IMM GRANULOCYTES NFR BLD AUTO: 0.3 % (ref 0–0.5)
LDLC SERPL CALC-MCNC: 193 MG/DL (ref 0–100)
LDLC/HDLC SERPL: 3.99 {RATIO}
LYMPHOCYTES # BLD AUTO: 1.69 10*3/MM3 (ref 0.7–3.1)
LYMPHOCYTES NFR BLD AUTO: 15.7 % (ref 19.6–45.3)
MCH RBC QN AUTO: 32.9 PG (ref 26.6–33)
MCHC RBC AUTO-ENTMCNC: 34.4 G/DL (ref 31.5–35.7)
MCV RBC AUTO: 95.8 FL (ref 79–97)
MONOCYTES # BLD AUTO: 0.66 10*3/MM3 (ref 0.1–0.9)
MONOCYTES NFR BLD AUTO: 6.1 % (ref 5–12)
NEUTROPHILS NFR BLD AUTO: 76 % (ref 42.7–76)
NEUTROPHILS NFR BLD AUTO: 8.16 10*3/MM3 (ref 1.7–7)
NRBC BLD AUTO-RTO: 0 /100 WBC (ref 0–0.2)
PLATELET # BLD AUTO: 241 10*3/MM3 (ref 140–450)
PMV BLD AUTO: 10.3 FL (ref 6–12)
POTASSIUM SERPL-SCNC: 4 MMOL/L (ref 3.5–5.2)
PROT SERPL-MCNC: 7.8 G/DL (ref 6–8.5)
RBC # BLD AUTO: 4.31 10*6/MM3 (ref 3.77–5.28)
SODIUM SERPL-SCNC: 141 MMOL/L (ref 136–145)
T4 FREE SERPL-MCNC: 0.86 NG/DL (ref 0.93–1.7)
TRIGL SERPL-MCNC: 292 MG/DL (ref 0–150)
TSH SERPL DL<=0.05 MIU/L-ACNC: 1.65 UIU/ML (ref 0.27–4.2)
VLDLC SERPL-MCNC: 57 MG/DL (ref 5–40)
WBC # BLD AUTO: 10.74 10*3/MM3 (ref 3.4–10.8)

## 2021-10-15 NOTE — PROGRESS NOTES
Labs show no significant abnormalities except for elevated cholesterol which is worsening a little.  Really watch diet and try to exercise as you can.  Lets recheck in 3 months.  Follow-up if you have any questions.  Diabetes is well controlled.

## 2021-10-18 DIAGNOSIS — I10 PRIMARY HYPERTENSION: ICD-10-CM

## 2021-10-18 RX ORDER — POTASSIUM CHLORIDE 20 MEQ/1
TABLET, EXTENDED RELEASE ORAL
Qty: 60 TABLET | Refills: 5 | Status: SHIPPED | OUTPATIENT
Start: 2021-10-18 | End: 2022-01-01

## 2021-10-22 ENCOUNTER — TELEPHONE (OUTPATIENT)
Dept: FAMILY MEDICINE CLINIC | Facility: CLINIC | Age: 56
End: 2021-10-22

## 2021-10-22 NOTE — TELEPHONE ENCOUNTER
Caller: Lidia Yeung    Relationship to patient: Self    Best call back number: [phone]     Patient is needing: PATIENT IS WANTING A CALL BACK FROM  OR HIS NURSE. SHE HAS SOME MEDICAL QUESTIONS.     PLEASE CALL AND ADVISE.

## 2021-11-01 ENCOUNTER — TELEPHONE (OUTPATIENT)
Dept: FAMILY MEDICINE CLINIC | Facility: CLINIC | Age: 56
End: 2021-11-01

## 2021-11-01 RX ORDER — HYDROCHLOROTHIAZIDE 12.5 MG/1
CAPSULE, GELATIN COATED ORAL
Qty: 90 CAPSULE | Refills: 0 | Status: SHIPPED | OUTPATIENT
Start: 2021-11-01 | End: 2022-01-14

## 2021-11-01 NOTE — TELEPHONE ENCOUNTER
Caller: Lidia Yeung    Relationship: Self    Best call back number: [phone] (    What was the call regarding: PATIENT CALLED STATING SHE WOULD LIKE A CALL BACK AS SHE HAS EDEMA IN BOTH LOWER LEGS AND FEET AND IS VERY UNCOMFORTABLE AND CONCERNED.    Do you require a callback: YES PLEASE CALL BACK AND ADVISE

## 2021-11-02 ENCOUNTER — APPOINTMENT (OUTPATIENT)
Dept: ULTRASOUND IMAGING | Facility: HOSPITAL | Age: 56
End: 2021-11-02

## 2021-11-02 ENCOUNTER — APPOINTMENT (OUTPATIENT)
Dept: MAMMOGRAPHY | Facility: HOSPITAL | Age: 56
End: 2021-11-02

## 2021-11-15 ENCOUNTER — TELEPHONE (OUTPATIENT)
Dept: FAMILY MEDICINE CLINIC | Facility: CLINIC | Age: 56
End: 2021-11-15

## 2021-11-15 NOTE — TELEPHONE ENCOUNTER
Caller: Lidia Yeung    Relationship: Self    Best call back number: [phone]     Who are you requesting to speak with (clinical staff, provider,  specific staff member): CLINICAL    What was the call regarding: THE PATIENT HAS CALLED STATING THAT SHE WOULD LIKE TO HAVE A MAMMOGRAM DONE AT [facility] AS SOON AS POSSIBLE.  PATIENT IS CURRENTLY UPSET/SCARED OF THE KNOT IN HER RIGHT BREAST.  PATIENT STATED SHE IS HAVING BACK SURGERY IN A MONTH AND PLANS TO CANCEL, AS SHE DOES NOT WANT TO BE OPENED UP IF THERE IS SOMETHING GOING ON WITH BREAST.     Do you require a callback: YES, PLEASE CALL AND ADVISE.

## 2021-11-22 ENCOUNTER — HOSPITAL ENCOUNTER (OUTPATIENT)
Dept: ULTRASOUND IMAGING | Facility: HOSPITAL | Age: 56
Discharge: HOME | End: 2021-11-22

## 2021-11-22 ENCOUNTER — HOSPITAL ENCOUNTER (OUTPATIENT)
Dept: MAMMOGRAPHY | Facility: HOSPITAL | Age: 56
Discharge: HOME | End: 2021-11-22

## 2021-11-22 DIAGNOSIS — N63.23 UNSPECIFIED LUMP IN THE LEFT BREAST, LOWER OUTER QUADRANT: ICD-10-CM

## 2021-11-22 DIAGNOSIS — N63.0 BREAST MASS IN FEMALE: ICD-10-CM

## 2021-11-22 PROCEDURE — G0279 TOMOSYNTHESIS, MAMMO: HCPCS

## 2021-11-22 PROCEDURE — 76642 ULTRASOUND BREAST LIMITED: CPT

## 2021-11-22 PROCEDURE — 77066 DX MAMMO INCL CAD BI: CPT

## 2021-11-22 RX ORDER — LEVETIRACETAM 750 MG/1
TABLET ORAL
Qty: 180 TABLET | Refills: 0 | Status: SHIPPED | OUTPATIENT
Start: 2021-11-22 | End: 2022-02-03

## 2021-11-29 ENCOUNTER — TELEPHONE (OUTPATIENT)
Dept: FAMILY MEDICINE CLINIC | Facility: CLINIC | Age: 56
End: 2021-11-29

## 2021-11-29 NOTE — TELEPHONE ENCOUNTER
Can you call and see if radiology has any further report.  Current report is that U/S showed no mass at time but was incomplete, radiology was awaiting prior mammograms to make a final reading.  Can we get a final reading in order to give the patient the final reading report.  Thanks.

## 2021-11-30 ENCOUNTER — TELEPHONE (OUTPATIENT)
Dept: FAMILY MEDICINE CLINIC | Facility: CLINIC | Age: 56
End: 2021-11-30

## 2021-11-30 DIAGNOSIS — E11.65 TYPE 2 DIABETES MELLITUS WITH HYPERGLYCEMIA, WITHOUT LONG-TERM CURRENT USE OF INSULIN (HCC): Primary | ICD-10-CM

## 2021-11-30 RX ORDER — DAPAGLIFLOZIN 10 MG/1
10 TABLET, FILM COATED ORAL DAILY
Qty: 90 TABLET | Refills: 1 | Status: SHIPPED | OUTPATIENT
Start: 2021-11-30 | End: 2022-01-01 | Stop reason: SDUPTHER

## 2021-11-30 NOTE — TELEPHONE ENCOUNTER
With her diabetes so well controlled- she can do trial off farxiga if she would like.  Let me know.  Thanks.

## 2021-11-30 NOTE — PROGRESS NOTES
Call pt: Radiology read mammogram and ultrasound and said that both were benign.  They recommend recheck in 12 months.  Follow-up if have any questions.

## 2021-11-30 NOTE — TELEPHONE ENCOUNTER
Caller: [facility] - [place], [place] - [address] - [phone] [facility] [phone] FX    Relationship: Pharmacy    Requested Prescriptions:   [facility]    Pharmacy where request should be sent: [facility] - [place], [place] - [address] - [phone]  - [phone] FX     Additional details provided by patient:     Does the patient have less than a 3 day supply:  [x] Yes  [] No    Luis Eduardo Garnica Rep   11/30/21 09:26 EST

## 2021-12-13 ENCOUNTER — OFFICE VISIT (OUTPATIENT)
Dept: FAMILY MEDICINE CLINIC | Facility: CLINIC | Age: 56
End: 2021-12-13

## 2021-12-13 VITALS
SYSTOLIC BLOOD PRESSURE: 102 MMHG | HEART RATE: 80 BPM | OXYGEN SATURATION: 99 % | DIASTOLIC BLOOD PRESSURE: 70 MMHG | TEMPERATURE: 97.3 F

## 2021-12-13 DIAGNOSIS — E11.65 TYPE 2 DIABETES MELLITUS WITH HYPERGLYCEMIA, WITHOUT LONG-TERM CURRENT USE OF INSULIN (HCC): ICD-10-CM

## 2021-12-13 DIAGNOSIS — N63.10 BREAST MASS, RIGHT: Primary | ICD-10-CM

## 2021-12-13 PROCEDURE — 99213 OFFICE O/P EST LOW 20 MIN: CPT | Performed by: FAMILY MEDICINE

## 2021-12-13 NOTE — PROGRESS NOTES
Chief Complaint  Breast Problem (Questions about mammogram and US results )    Subjective          Lidia Yeung presents to [facility]  Discussed mammogram and ultrasound- pt has strong family history of breast cancer and cancers in her family- she wants second opinion from surgeon regarding mass in her breast- she says this will ease her mind    Breast Problem  This is a new problem. The current episode started more than 1 month ago. The problem occurs constantly. The problem has been unchanged. Pertinent negatives include no abdominal pain, anorexia, arthralgias, change in bowel habit, chest pain, chills, congestion, coughing, diaphoresis, fatigue, fever, headaches, joint swelling, myalgias, nausea, neck pain, numbness, rash, sore throat, swollen glands, urinary symptoms, vertigo, visual change, vomiting or weakness. Nothing aggravates the symptoms. She has tried nothing for the symptoms.       Objective   Allergies   Allergen Reactions   • Atorvastatin    • Atorvastatin Calcium Swelling   • Ciprofloxacin Itching     FEVER,CHILLS   • Erythromycin    • Fentanyl Unknown - Low Severity   • Nsaids Unknown - Low Severity   • Penicillins Unknown - Low Severity   • Rosuvastatin    • Statins Unknown - Low Severity     Immunization History   Administered Date(s) Administered   • FluMist 2-49yrs 10/03/2014       Vital Signs:   Vitals:    12/13/21 1655   BP: 102/70   Pulse: 80   Temp: 97.3 °F (36.3 °C)   SpO2: 99%       Physical Exam  Vitals reviewed.   Constitutional:       Appearance: Normal appearance. She is well-developed.   HENT:      Head: Normocephalic and atraumatic.      Right Ear: External ear normal.      Left Ear: External ear normal.      Mouth/Throat:      Pharynx: No oropharyngeal exudate.   Eyes:      Conjunctiva/sclera: Conjunctivae normal.      Pupils: Pupils are equal, round, and reactive to light.   Cardiovascular:      Rate and Rhythm: Normal rate and regular rhythm.       Pulses: Normal pulses.      Heart sounds: Normal heart sounds. No murmur heard.  No friction rub. No gallop.    Pulmonary:      Effort: Pulmonary effort is normal.      Breath sounds: Normal breath sounds. No wheezing or rhonchi.   Abdominal:      General: Bowel sounds are normal. There is no distension.      Palpations: Abdomen is soft.      Tenderness: There is no abdominal tenderness.   Musculoskeletal:         General: Normal range of motion.   Skin:     General: Skin is warm and dry.      Capillary Refill: Capillary refill takes less than 2 seconds.   Neurological:      General: No focal deficit present.      Mental Status: She is alert and oriented to person, place, and time.      Cranial Nerves: No cranial nerve deficit.   Psychiatric:         Mood and Affect: Mood and affect normal.         Behavior: Behavior normal.         Thought Content: Thought content normal.         Judgment: Judgment normal.        Result Review :                 Assessment and Plan    Diagnoses and all orders for this visit:    1. Breast mass, right (Primary)  -     Ambulatory Referral to General Surgery    2. Type 2 diabetes mellitus with hyperglycemia, without long-term current use of insulin (HCC)  -     Ambulatory Referral to Podiatry  -     CBC Auto Differential; Future  -     Comprehensive Metabolic Panel; Future  -     Hemoglobin A1c; Future  -     Lipid Panel; Future  -     TSH+Free T4; Future  -     MicroAlbumin, Urine, Random - Urine, Clean Catch; Future            Follow Up   Return in about 4 months (around 4/13/2022) for Recheck.  Patient was given instructions and counseling regarding her condition or for health maintenance advice. Please see specific information pulled into the AVS if appropriate.

## 2022-01-01 ENCOUNTER — OFFICE VISIT (OUTPATIENT)
Dept: PLASTIC SURGERY | Facility: CLINIC | Age: 57
End: 2022-01-01

## 2022-01-01 ENCOUNTER — APPOINTMENT (OUTPATIENT)
Dept: CT IMAGING | Facility: HOSPITAL | Age: 57
End: 2022-01-01

## 2022-01-01 ENCOUNTER — ANESTHESIA EVENT (OUTPATIENT)
Dept: PERIOP | Facility: HOSPITAL | Age: 57
End: 2022-01-01

## 2022-01-01 ENCOUNTER — HOSPITAL ENCOUNTER (OUTPATIENT)
Dept: MAMMOGRAPHY | Facility: HOSPITAL | Age: 57
Discharge: HOME | End: 2022-09-01
Admitting: SURGERY

## 2022-01-01 ENCOUNTER — CLINICAL SUPPORT (OUTPATIENT)
Dept: PLASTIC SURGERY | Facility: CLINIC | Age: 57
End: 2022-01-01
Payer: MEDICARE

## 2022-01-01 ENCOUNTER — TELEPHONE (OUTPATIENT)
Dept: FAMILY MEDICINE CLINIC | Facility: CLINIC | Age: 57
End: 2022-01-01

## 2022-01-01 ENCOUNTER — OFFICE VISIT (OUTPATIENT)
Dept: FAMILY MEDICINE CLINIC | Facility: CLINIC | Age: 57
End: 2022-01-01

## 2022-01-01 ENCOUNTER — PREP FOR SURGERY (OUTPATIENT)
Dept: OTHER | Facility: HOSPITAL | Age: 57
End: 2022-01-01

## 2022-01-01 ENCOUNTER — ANESTHESIA (OUTPATIENT)
Dept: PERIOP | Facility: HOSPITAL | Age: 57
End: 2022-01-01

## 2022-01-01 ENCOUNTER — HOSPITAL ENCOUNTER (OUTPATIENT)
Facility: HOSPITAL | Age: 57
Discharge: HOME | End: 2022-09-14
Attending: SURGERY | Admitting: SURGERY

## 2022-01-01 ENCOUNTER — OFFICE VISIT (OUTPATIENT)
Dept: SURGERY | Facility: CLINIC | Age: 57
End: 2022-01-01

## 2022-01-01 ENCOUNTER — CLINICAL SUPPORT (OUTPATIENT)
Dept: PLASTIC SURGERY | Facility: CLINIC | Age: 57
End: 2022-01-01

## 2022-01-01 ENCOUNTER — PATIENT ROUNDING (BHMG ONLY) (OUTPATIENT)
Dept: PLASTIC SURGERY | Facility: CLINIC | Age: 57
End: 2022-01-01

## 2022-01-01 ENCOUNTER — DOCUMENTATION (OUTPATIENT)
Dept: FAMILY MEDICINE CLINIC | Facility: CLINIC | Age: 57
End: 2022-01-01

## 2022-01-01 ENCOUNTER — TRANSCRIBE ORDERS (OUTPATIENT)
Dept: ADMINISTRATIVE | Facility: HOSPITAL | Age: 57
End: 2022-01-01

## 2022-01-01 VITALS
BODY MASS INDEX: 34.5 KG/M2 | SYSTOLIC BLOOD PRESSURE: 136 MMHG | TEMPERATURE: 96.9 F | WEIGHT: 201 LBS | HEART RATE: 107 BPM | OXYGEN SATURATION: 98 % | DIASTOLIC BLOOD PRESSURE: 88 MMHG

## 2022-01-01 VITALS
BODY MASS INDEX: 32.78 KG/M2 | HEIGHT: 66 IN | WEIGHT: 204 LBS | SYSTOLIC BLOOD PRESSURE: 170 MMHG | TEMPERATURE: 97.1 F | DIASTOLIC BLOOD PRESSURE: 101 MMHG | OXYGEN SATURATION: 94 % | HEART RATE: 102 BPM

## 2022-01-01 VITALS
HEART RATE: 125 BPM | BODY MASS INDEX: 33.3 KG/M2 | OXYGEN SATURATION: 97 % | WEIGHT: 207.2 LBS | TEMPERATURE: 96.9 F | HEIGHT: 66 IN | SYSTOLIC BLOOD PRESSURE: 148 MMHG | DIASTOLIC BLOOD PRESSURE: 76 MMHG

## 2022-01-01 VITALS
SYSTOLIC BLOOD PRESSURE: 143 MMHG | RESPIRATION RATE: 16 BRPM | WEIGHT: 204.15 LBS | HEIGHT: 66 IN | TEMPERATURE: 97.8 F | OXYGEN SATURATION: 98 % | HEART RATE: 92 BPM | DIASTOLIC BLOOD PRESSURE: 78 MMHG | BODY MASS INDEX: 32.81 KG/M2

## 2022-01-01 VITALS
BODY MASS INDEX: 32.78 KG/M2 | HEIGHT: 66 IN | HEART RATE: 109 BPM | WEIGHT: 204 LBS | DIASTOLIC BLOOD PRESSURE: 88 MMHG | SYSTOLIC BLOOD PRESSURE: 135 MMHG | OXYGEN SATURATION: 96 %

## 2022-01-01 VITALS
TEMPERATURE: 97.2 F | DIASTOLIC BLOOD PRESSURE: 74 MMHG | OXYGEN SATURATION: 97 % | BODY MASS INDEX: 33.16 KG/M2 | HEIGHT: 64 IN | WEIGHT: 194.2 LBS | SYSTOLIC BLOOD PRESSURE: 162 MMHG | HEART RATE: 117 BPM

## 2022-01-01 VITALS
HEART RATE: 93 BPM | HEIGHT: 66 IN | OXYGEN SATURATION: 94 % | SYSTOLIC BLOOD PRESSURE: 151 MMHG | BODY MASS INDEX: 32.78 KG/M2 | WEIGHT: 204 LBS | DIASTOLIC BLOOD PRESSURE: 87 MMHG

## 2022-01-01 VITALS
BODY MASS INDEX: 34.33 KG/M2 | OXYGEN SATURATION: 94 % | SYSTOLIC BLOOD PRESSURE: 175 MMHG | DIASTOLIC BLOOD PRESSURE: 94 MMHG | HEIGHT: 66 IN | TEMPERATURE: 97.8 F | HEART RATE: 108 BPM | WEIGHT: 213.6 LBS

## 2022-01-01 VITALS
OXYGEN SATURATION: 96 % | TEMPERATURE: 98.4 F | WEIGHT: 201.6 LBS | DIASTOLIC BLOOD PRESSURE: 102 MMHG | SYSTOLIC BLOOD PRESSURE: 162 MMHG | BODY MASS INDEX: 34.42 KG/M2 | HEART RATE: 105 BPM | HEIGHT: 64 IN

## 2022-01-01 VITALS
SYSTOLIC BLOOD PRESSURE: 177 MMHG | TEMPERATURE: 98.6 F | OXYGEN SATURATION: 98 % | HEART RATE: 109 BPM | BODY MASS INDEX: 34.04 KG/M2 | DIASTOLIC BLOOD PRESSURE: 111 MMHG | HEIGHT: 66 IN | WEIGHT: 211.8 LBS

## 2022-01-01 VITALS
HEART RATE: 122 BPM | WEIGHT: 200 LBS | TEMPERATURE: 98.7 F | HEIGHT: 66 IN | OXYGEN SATURATION: 97 % | DIASTOLIC BLOOD PRESSURE: 86 MMHG | BODY MASS INDEX: 32.14 KG/M2 | SYSTOLIC BLOOD PRESSURE: 123 MMHG

## 2022-01-01 VITALS — RESPIRATION RATE: 15 BRPM | HEIGHT: 64 IN | BODY MASS INDEX: 34.66 KG/M2 | WEIGHT: 203 LBS

## 2022-01-01 VITALS — WEIGHT: 204 LBS | BODY MASS INDEX: 32.78 KG/M2 | HEIGHT: 66 IN | RESPIRATION RATE: 17 BRPM

## 2022-01-01 VITALS
BODY MASS INDEX: 34.72 KG/M2 | HEIGHT: 64 IN | WEIGHT: 203.4 LBS | DIASTOLIC BLOOD PRESSURE: 85 MMHG | OXYGEN SATURATION: 97 % | TEMPERATURE: 99.1 F | SYSTOLIC BLOOD PRESSURE: 132 MMHG | HEART RATE: 97 BPM

## 2022-01-01 DIAGNOSIS — Z15.01 BRCA GENE MUTATION POSITIVE: Primary | ICD-10-CM

## 2022-01-01 DIAGNOSIS — Z09 POSTOPERATIVE FOLLOW-UP: Primary | ICD-10-CM

## 2022-01-01 DIAGNOSIS — I10 PRIMARY HYPERTENSION: ICD-10-CM

## 2022-01-01 DIAGNOSIS — E11.65 TYPE 2 DIABETES MELLITUS WITH HYPERGLYCEMIA, WITHOUT LONG-TERM CURRENT USE OF INSULIN: ICD-10-CM

## 2022-01-01 DIAGNOSIS — Z90.13 S/P MASTECTOMY, BILATERAL: Primary | ICD-10-CM

## 2022-01-01 DIAGNOSIS — Z15.09 BRCA GENE MUTATION POSITIVE: Primary | ICD-10-CM

## 2022-01-01 DIAGNOSIS — G89.29 CHRONIC BILATERAL THORACIC BACK PAIN: ICD-10-CM

## 2022-01-01 DIAGNOSIS — E03.9 HYPOTHYROIDISM, UNSPECIFIED TYPE: ICD-10-CM

## 2022-01-01 DIAGNOSIS — G89.29 CHRONIC BACK PAIN, UNSPECIFIED BACK LOCATION, UNSPECIFIED BACK PAIN LATERALITY: ICD-10-CM

## 2022-01-01 DIAGNOSIS — N20.0 KIDNEY STONE: Primary | ICD-10-CM

## 2022-01-01 DIAGNOSIS — Z15.09 BRCA GENE MUTATION POSITIVE: ICD-10-CM

## 2022-01-01 DIAGNOSIS — Z15.01 BRCA GENE MUTATION POSITIVE: ICD-10-CM

## 2022-01-01 DIAGNOSIS — R32 URINARY INCONTINENCE, UNSPECIFIED TYPE: ICD-10-CM

## 2022-01-01 DIAGNOSIS — Z12.31 BREAST CANCER SCREENING BY MAMMOGRAM: Primary | ICD-10-CM

## 2022-01-01 DIAGNOSIS — J30.89 NON-SEASONAL ALLERGIC RHINITIS, UNSPECIFIED TRIGGER: ICD-10-CM

## 2022-01-01 DIAGNOSIS — B37.31 VAGINAL CANDIDIASIS: Primary | ICD-10-CM

## 2022-01-01 DIAGNOSIS — M19.90 ARTHRITIS: ICD-10-CM

## 2022-01-01 DIAGNOSIS — N64.4 BREAST PAIN: ICD-10-CM

## 2022-01-01 DIAGNOSIS — R31.9 HEMATURIA, UNSPECIFIED TYPE: ICD-10-CM

## 2022-01-01 DIAGNOSIS — M54.9 CHRONIC BACK PAIN, UNSPECIFIED BACK LOCATION, UNSPECIFIED BACK PAIN LATERALITY: Primary | ICD-10-CM

## 2022-01-01 DIAGNOSIS — K58.9 IRRITABLE BOWEL SYNDROME, UNSPECIFIED TYPE: ICD-10-CM

## 2022-01-01 DIAGNOSIS — R91.1 LUNG NODULE: ICD-10-CM

## 2022-01-01 DIAGNOSIS — Z12.31 BREAST CANCER SCREENING BY MAMMOGRAM: ICD-10-CM

## 2022-01-01 DIAGNOSIS — K21.9 GASTROESOPHAGEAL REFLUX DISEASE WITHOUT ESOPHAGITIS: ICD-10-CM

## 2022-01-01 DIAGNOSIS — F32.A ANXIETY AND DEPRESSION: ICD-10-CM

## 2022-01-01 DIAGNOSIS — Z12.83 SKIN CANCER SCREENING: ICD-10-CM

## 2022-01-01 DIAGNOSIS — Z15.01 BRCA GENE MUTATION TEST POSITIVE: ICD-10-CM

## 2022-01-01 DIAGNOSIS — M54.9 CHRONIC BACK PAIN, UNSPECIFIED BACK LOCATION, UNSPECIFIED BACK PAIN LATERALITY: ICD-10-CM

## 2022-01-01 DIAGNOSIS — Z15.09 BRCA GENE MUTATION TEST POSITIVE: ICD-10-CM

## 2022-01-01 DIAGNOSIS — Z01.818 PRE-OPERATIVE EXAMINATION: Primary | ICD-10-CM

## 2022-01-01 DIAGNOSIS — F41.9 ANXIETY AND DEPRESSION: ICD-10-CM

## 2022-01-01 DIAGNOSIS — R10.9 FLANK PAIN: Primary | ICD-10-CM

## 2022-01-01 DIAGNOSIS — I10 PRIMARY HYPERTENSION: Primary | ICD-10-CM

## 2022-01-01 DIAGNOSIS — G89.29 CHRONIC BACK PAIN, UNSPECIFIED BACK LOCATION, UNSPECIFIED BACK PAIN LATERALITY: Primary | ICD-10-CM

## 2022-01-01 DIAGNOSIS — R07.82 INTERCOSTAL PAIN: Primary | ICD-10-CM

## 2022-01-01 DIAGNOSIS — Z80.3 FH: BREAST CANCER: ICD-10-CM

## 2022-01-01 DIAGNOSIS — M54.6 CHRONIC BILATERAL THORACIC BACK PAIN: ICD-10-CM

## 2022-01-01 DIAGNOSIS — G89.18 POST-OP PAIN: ICD-10-CM

## 2022-01-01 DIAGNOSIS — E66.9 CLASS 1 OBESITY WITH SERIOUS COMORBIDITY AND BODY MASS INDEX (BMI) OF 34.0 TO 34.9 IN ADULT, UNSPECIFIED OBESITY TYPE: ICD-10-CM

## 2022-01-01 DIAGNOSIS — E78.2 MIXED HYPERLIPIDEMIA: ICD-10-CM

## 2022-01-01 LAB
ALBUMIN SERPL-MCNC: 4.9 G/DL (ref 3.5–5.2)
ALBUMIN/GLOB SERPL: 1.9 G/DL
ALP SERPL-CCNC: 108 U/L (ref 39–117)
ALT SERPL W P-5'-P-CCNC: 31 U/L (ref 1–33)
ANION GAP SERPL CALCULATED.3IONS-SCNC: 17.1 MMOL/L (ref 5–15)
AST SERPL-CCNC: 28 U/L (ref 1–32)
BASOPHILS # BLD AUTO: 0.04 10*3/MM3 (ref 0–0.2)
BASOPHILS NFR BLD AUTO: 0.5 % (ref 0–1.5)
BILIRUB BLD-MCNC: NEGATIVE MG/DL
BILIRUB SERPL-MCNC: 0.3 MG/DL (ref 0–1.2)
BUN SERPL-MCNC: 16 MG/DL (ref 6–20)
BUN/CREAT SERPL: 15.8 (ref 7–25)
CALCIUM SPEC-SCNC: 10.3 MG/DL (ref 8.6–10.5)
CHLORIDE SERPL-SCNC: 95 MMOL/L (ref 98–107)
CHOLEST SERPL-MCNC: 272 MG/DL (ref 0–200)
CLARITY, POC: CLEAR
CO2 SERPL-SCNC: 29.9 MMOL/L (ref 22–29)
COLOR UR: YELLOW
CREAT SERPL-MCNC: 1.01 MG/DL (ref 0.57–1)
CYTO UR: NORMAL
DEPRECATED RDW RBC AUTO: 45.5 FL (ref 37–54)
EGFRCR SERPLBLD CKD-EPI 2021: 65.1 ML/MIN/1.73
EOSINOPHIL # BLD AUTO: 0.12 10*3/MM3 (ref 0–0.4)
EOSINOPHIL NFR BLD AUTO: 1.6 % (ref 0.3–6.2)
ERYTHROCYTE [DISTWIDTH] IN BLOOD BY AUTOMATED COUNT: 13.9 % (ref 12.3–15.4)
EXPIRATION DATE: NORMAL
GLOBULIN UR ELPH-MCNC: 2.6 GM/DL
GLUCOSE BLDC GLUCOMTR-MCNC: 115 MG/DL (ref 70–99)
GLUCOSE BLDC GLUCOMTR-MCNC: 157 MG/DL (ref 70–99)
GLUCOSE SERPL-MCNC: 137 MG/DL (ref 65–99)
GLUCOSE UR STRIP-MCNC: NEGATIVE MG/DL
HBA1C MFR BLD: 7 % (ref 4.8–5.6)
HCT VFR BLD AUTO: 42.1 % (ref 34–46.6)
HDLC SERPL-MCNC: 47 MG/DL (ref 40–60)
HGB BLD-MCNC: 14.5 G/DL (ref 12–15.9)
IMM GRANULOCYTES # BLD AUTO: 0.02 10*3/MM3 (ref 0–0.05)
IMM GRANULOCYTES NFR BLD AUTO: 0.3 % (ref 0–0.5)
KETONES UR QL: NEGATIVE
LAB AP CASE REPORT: NORMAL
LAB AP CLINICAL INFORMATION: NORMAL
LDLC SERPL CALC-MCNC: 175 MG/DL (ref 0–100)
LDLC/HDLC SERPL: 3.66 {RATIO}
LEUKOCYTE EST, POC: NEGATIVE
LYMPHOCYTES # BLD AUTO: 1.47 10*3/MM3 (ref 0.7–3.1)
LYMPHOCYTES NFR BLD AUTO: 20 % (ref 19.6–45.3)
Lab: NORMAL
MCH RBC QN AUTO: 31 PG (ref 26.6–33)
MCHC RBC AUTO-ENTMCNC: 34.4 G/DL (ref 31.5–35.7)
MCV RBC AUTO: 90.1 FL (ref 79–97)
MONOCYTES # BLD AUTO: 0.6 10*3/MM3 (ref 0.1–0.9)
MONOCYTES NFR BLD AUTO: 8.2 % (ref 5–12)
NEUTROPHILS NFR BLD AUTO: 5.1 10*3/MM3 (ref 1.7–7)
NEUTROPHILS NFR BLD AUTO: 69.4 % (ref 42.7–76)
NITRITE UR-MCNC: NEGATIVE MG/ML
NRBC BLD AUTO-RTO: 0.1 /100 WBC (ref 0–0.2)
PATH REPORT.FINAL DX SPEC: NORMAL
PATH REPORT.GROSS SPEC: NORMAL
PH UR: 7 [PH] (ref 5–8)
PLATELET # BLD AUTO: 224 10*3/MM3 (ref 140–450)
PMV BLD AUTO: 10.1 FL (ref 6–12)
POTASSIUM SERPL-SCNC: 3.3 MMOL/L (ref 3.5–5.2)
PROT SERPL-MCNC: 7.5 G/DL (ref 6–8.5)
PROT UR STRIP-MCNC: NEGATIVE MG/DL
RBC # BLD AUTO: 4.67 10*6/MM3 (ref 3.77–5.28)
RBC # UR STRIP: NEGATIVE /UL
SODIUM SERPL-SCNC: 142 MMOL/L (ref 136–145)
SP GR UR: 1.01 (ref 1–1.03)
T4 FREE SERPL-MCNC: 1.01 NG/DL (ref 0.93–1.7)
TRIGL SERPL-MCNC: 264 MG/DL (ref 0–150)
TSH SERPL DL<=0.05 MIU/L-ACNC: 1.71 UIU/ML (ref 0.27–4.2)
UROBILINOGEN UR QL: NORMAL
VLDLC SERPL-MCNC: 50 MG/DL (ref 5–40)
WBC NRBC COR # BLD: 7.35 10*3/MM3 (ref 3.4–10.8)

## 2022-01-01 PROCEDURE — 25010000002 FENTANYL CITRATE (PF) 50 MCG/ML SOLUTION: Performed by: NURSE ANESTHETIST, CERTIFIED REGISTERED

## 2022-01-01 PROCEDURE — 99024 POSTOP FOLLOW-UP VISIT: CPT | Performed by: NURSE PRACTITIONER

## 2022-01-01 PROCEDURE — 25010000002 CEFAZOLIN PER 500 MG: Performed by: SURGERY

## 2022-01-01 PROCEDURE — 25010000002 ONDANSETRON PER 1 MG: Performed by: NURSE ANESTHETIST, CERTIFIED REGISTERED

## 2022-01-01 PROCEDURE — C1789 PROSTHESIS, BREAST, IMP: HCPCS | Performed by: SURGERY

## 2022-01-01 PROCEDURE — 99024 POSTOP FOLLOW-UP VISIT: CPT | Performed by: SURGERY

## 2022-01-01 PROCEDURE — 19340 INSJ BREAST IMPLT SM D MAST: CPT | Performed by: SURGERY

## 2022-01-01 PROCEDURE — 25010000002 DEXAMETHASONE PER 1 MG: Performed by: NURSE ANESTHETIST, CERTIFIED REGISTERED

## 2022-01-01 PROCEDURE — 25010000002 GENTAMICIN PER 80 MG: Performed by: SURGERY

## 2022-01-01 PROCEDURE — 99204 OFFICE O/P NEW MOD 45 MIN: CPT | Performed by: NURSE PRACTITIONER

## 2022-01-01 PROCEDURE — 25010000002 PROPOFOL 10 MG/ML EMULSION: Performed by: NURSE ANESTHETIST, CERTIFIED REGISTERED

## 2022-01-01 PROCEDURE — 25010000002 MORPHINE PER 10 MG: Performed by: SURGERY

## 2022-01-01 PROCEDURE — 80053 COMPREHEN METABOLIC PANEL: CPT | Performed by: FAMILY MEDICINE

## 2022-01-01 PROCEDURE — C1889 IMPLANT/INSERT DEVICE, NOC: HCPCS | Performed by: SURGERY

## 2022-01-01 PROCEDURE — 25010000002 HYDROMORPHONE 1 MG/ML SOLUTION: Performed by: NURSE ANESTHETIST, CERTIFIED REGISTERED

## 2022-01-01 PROCEDURE — 99203 OFFICE O/P NEW LOW 30 MIN: CPT | Performed by: SURGERY

## 2022-01-01 PROCEDURE — 99214 OFFICE O/P EST MOD 30 MIN: CPT | Performed by: NURSE PRACTITIONER

## 2022-01-01 PROCEDURE — 19303 MAST SIMPLE COMPLETE: CPT | Performed by: SURGERY

## 2022-01-01 PROCEDURE — 77067 SCR MAMMO BI INCL CAD: CPT

## 2022-01-01 PROCEDURE — 99213 OFFICE O/P EST LOW 20 MIN: CPT | Performed by: FAMILY MEDICINE

## 2022-01-01 PROCEDURE — 99213 OFFICE O/P EST LOW 20 MIN: CPT | Performed by: SURGERY

## 2022-01-01 PROCEDURE — 81003 URINALYSIS AUTO W/O SCOPE: CPT | Performed by: FAMILY MEDICINE

## 2022-01-01 PROCEDURE — 82962 GLUCOSE BLOOD TEST: CPT

## 2022-01-01 PROCEDURE — 99214 OFFICE O/P EST MOD 30 MIN: CPT | Performed by: FAMILY MEDICINE

## 2022-01-01 PROCEDURE — 25010000002 MIDAZOLAM PER 1 MG: Performed by: ANESTHESIOLOGY

## 2022-01-01 PROCEDURE — 15860 IV NJX TST VASC FLO FLAP/GRF: CPT | Performed by: SURGERY

## 2022-01-01 PROCEDURE — 85025 COMPLETE CBC W/AUTO DIFF WBC: CPT | Performed by: FAMILY MEDICINE

## 2022-01-01 PROCEDURE — 88307 TISSUE EXAM BY PATHOLOGIST: CPT | Performed by: SURGERY

## 2022-01-01 PROCEDURE — 84439 ASSAY OF FREE THYROXINE: CPT | Performed by: FAMILY MEDICINE

## 2022-01-01 PROCEDURE — 83036 HEMOGLOBIN GLYCOSYLATED A1C: CPT | Performed by: FAMILY MEDICINE

## 2022-01-01 PROCEDURE — 84443 ASSAY THYROID STIM HORMONE: CPT | Performed by: FAMILY MEDICINE

## 2022-01-01 PROCEDURE — 94799 UNLISTED PULMONARY SVC/PX: CPT

## 2022-01-01 PROCEDURE — 15777 ACELLULAR DERM MATRIX IMPLT: CPT | Performed by: SURGERY

## 2022-01-01 PROCEDURE — C1781 MESH (IMPLANTABLE): HCPCS | Performed by: SURGERY

## 2022-01-01 PROCEDURE — 36415 COLL VENOUS BLD VENIPUNCTURE: CPT | Performed by: FAMILY MEDICINE

## 2022-01-01 PROCEDURE — 0 MEPERIDINE PER 100 MG: Performed by: NURSE ANESTHETIST, CERTIFIED REGISTERED

## 2022-01-01 PROCEDURE — 77063 BREAST TOMOSYNTHESIS BI: CPT

## 2022-01-01 PROCEDURE — 80061 LIPID PANEL: CPT | Performed by: FAMILY MEDICINE

## 2022-01-01 DEVICE — LIGACLIP MCA MULTIPLE CLIP APPLIERS, 20 MEDIUM CLIPS
Type: IMPLANTABLE DEVICE | Site: BREAST | Status: FUNCTIONAL
Brand: LIGACLIP

## 2022-01-01 DEVICE — GRFT TISS ALLODERM SELCT RTM CONTR PERF MD/THK 200CM/SQ XL: Type: IMPLANTABLE DEVICE | Site: BREAST | Status: FUNCTIONAL

## 2022-01-01 DEVICE — IMPLANTABLE DEVICE: Type: IMPLANTABLE DEVICE | Site: BREAST | Status: FUNCTIONAL

## 2022-01-01 DEVICE — UNDYED KNITTED (POLYGLACTIN 910), SYNTHETIC ABSORBABLE MESH
Type: IMPLANTABLE DEVICE | Site: BREAST | Status: FUNCTIONAL
Brand: VICRYL

## 2022-01-01 RX ORDER — POLYETHYLENE GLYCOL 3350 17 G/17G
17 POWDER, FOR SOLUTION ORAL
COMMUNITY

## 2022-01-01 RX ORDER — LISINOPRIL 20 MG/1
20 TABLET ORAL DAILY
Qty: 90 TABLET | Refills: 1 | Status: SHIPPED | OUTPATIENT
Start: 2022-01-01

## 2022-01-01 RX ORDER — LIDOCAINE HYDROCHLORIDE 20 MG/ML
INJECTION, SOLUTION EPIDURAL; INFILTRATION; INTRACAUDAL; PERINEURAL AS NEEDED
Status: DISCONTINUED | OUTPATIENT
Start: 2022-01-01 | End: 2022-01-01 | Stop reason: SURG

## 2022-01-01 RX ORDER — HYDROCHLOROTHIAZIDE 25 MG/1
25 TABLET ORAL DAILY
Qty: 90 TABLET | Refills: 1 | Status: SHIPPED | OUTPATIENT
Start: 2022-01-01 | End: 2022-01-01 | Stop reason: SDUPTHER

## 2022-01-01 RX ORDER — LEVETIRACETAM 750 MG/1
TABLET ORAL
Qty: 180 TABLET | Refills: 0 | Status: SHIPPED | OUTPATIENT
Start: 2022-01-01 | End: 2022-01-01

## 2022-01-01 RX ORDER — DULOXETIN HYDROCHLORIDE 60 MG/1
60 CAPSULE, DELAYED RELEASE ORAL DAILY
Qty: 90 CAPSULE | Refills: 1 | Status: SHIPPED | OUTPATIENT
Start: 2022-01-01

## 2022-01-01 RX ORDER — FUROSEMIDE 40 MG/1
40 TABLET ORAL DAILY
Status: DISCONTINUED | OUTPATIENT
Start: 2022-01-01 | End: 2022-01-01 | Stop reason: HOSPADM

## 2022-01-01 RX ORDER — LAMOTRIGINE 25 MG/1
25 TABLET ORAL DAILY
Status: DISCONTINUED | OUTPATIENT
Start: 2022-01-01 | End: 2022-01-01 | Stop reason: HOSPADM

## 2022-01-01 RX ORDER — FLUTICASONE PROPIONATE 50 MCG
SPRAY, SUSPENSION (ML) NASAL
Qty: 48 G | Refills: 1 | Status: SHIPPED | OUTPATIENT
Start: 2022-01-01

## 2022-01-01 RX ORDER — ALPRAZOLAM 1 MG/1
2 TABLET ORAL 3 TIMES DAILY PRN
Status: DISCONTINUED | OUTPATIENT
Start: 2022-01-01 | End: 2022-01-01 | Stop reason: HOSPADM

## 2022-01-01 RX ORDER — DULOXETIN HYDROCHLORIDE 30 MG/1
60 CAPSULE, DELAYED RELEASE ORAL DAILY
Status: DISCONTINUED | OUTPATIENT
Start: 2022-01-01 | End: 2022-01-01 | Stop reason: HOSPADM

## 2022-01-01 RX ORDER — LEVETIRACETAM 750 MG/1
1 TABLET ORAL
COMMUNITY
Start: 2022-01-01 | End: 2022-01-01 | Stop reason: SDUPTHER

## 2022-01-01 RX ORDER — DOCUSATE SODIUM 100 MG/1
100 CAPSULE, LIQUID FILLED ORAL 2 TIMES DAILY
Status: DISCONTINUED | OUTPATIENT
Start: 2022-01-01 | End: 2022-01-01 | Stop reason: HOSPADM

## 2022-01-01 RX ORDER — MIDAZOLAM HYDROCHLORIDE 1 MG/ML
2 INJECTION INTRAMUSCULAR; INTRAVENOUS ONCE
Status: COMPLETED | OUTPATIENT
Start: 2022-01-01 | End: 2022-01-01

## 2022-01-01 RX ORDER — MULTIPLE VITAMINS W/ MINERALS TAB 9MG-400MCG
TAB ORAL
COMMUNITY
End: 2022-01-01

## 2022-01-01 RX ORDER — LUBIPROSTONE 24 UG/1
24 CAPSULE ORAL
COMMUNITY
Start: 2022-01-01 | End: 2023-01-01

## 2022-01-01 RX ORDER — FUROSEMIDE 40 MG/1
TABLET ORAL
Qty: 90 TABLET | Refills: 1 | Status: SHIPPED | OUTPATIENT
Start: 2022-01-01

## 2022-01-01 RX ORDER — FLUCONAZOLE 150 MG/1
150 TABLET ORAL DAILY
Qty: 2 TABLET | Refills: 1 | Status: SHIPPED | OUTPATIENT
Start: 2022-01-01 | End: 2022-01-01

## 2022-01-01 RX ORDER — LEVOTHYROXINE SODIUM 0.05 MG/1
TABLET ORAL
Qty: 60 TABLET | Refills: 0 | Status: SHIPPED | OUTPATIENT
Start: 2022-01-01 | End: 2022-01-01 | Stop reason: SDUPTHER

## 2022-01-01 RX ORDER — OXYCODONE HYDROCHLORIDE 5 MG/1
5 TABLET ORAL
Status: COMPLETED | OUTPATIENT
Start: 2022-01-01 | End: 2022-01-01

## 2022-01-01 RX ORDER — PROPOFOL 10 MG/ML
VIAL (ML) INTRAVENOUS AS NEEDED
Status: DISCONTINUED | OUTPATIENT
Start: 2022-01-01 | End: 2022-01-01 | Stop reason: SURG

## 2022-01-01 RX ORDER — NALOXONE HCL 0.4 MG/ML
0.4 VIAL (ML) INJECTION
Status: DISCONTINUED | OUTPATIENT
Start: 2022-01-01 | End: 2022-01-01 | Stop reason: HOSPADM

## 2022-01-01 RX ORDER — CLINDAMYCIN HYDROCHLORIDE 300 MG/1
300 CAPSULE ORAL 3 TIMES DAILY
Qty: 30 CAPSULE | Refills: 0 | Status: SHIPPED | OUTPATIENT
Start: 2022-01-01 | End: 2022-01-01

## 2022-01-01 RX ORDER — SUCCINYLCHOLINE/SOD CL,ISO/PF 100 MG/5ML
SYRINGE (ML) INTRAVENOUS AS NEEDED
Status: DISCONTINUED | OUTPATIENT
Start: 2022-01-01 | End: 2022-01-01 | Stop reason: SURG

## 2022-01-01 RX ORDER — FENTANYL CITRATE 50 UG/ML
INJECTION, SOLUTION INTRAMUSCULAR; INTRAVENOUS AS NEEDED
Status: DISCONTINUED | OUTPATIENT
Start: 2022-01-01 | End: 2022-01-01 | Stop reason: SURG

## 2022-01-01 RX ORDER — ONDANSETRON 2 MG/ML
INJECTION INTRAMUSCULAR; INTRAVENOUS AS NEEDED
Status: DISCONTINUED | OUTPATIENT
Start: 2022-01-01 | End: 2022-01-01 | Stop reason: SURG

## 2022-01-01 RX ORDER — PROMETHAZINE HYDROCHLORIDE 25 MG/1
25 SUPPOSITORY RECTAL ONCE AS NEEDED
Status: DISCONTINUED | OUTPATIENT
Start: 2022-01-01 | End: 2022-01-01 | Stop reason: HOSPADM

## 2022-01-01 RX ORDER — PHENYLEPHRINE HCL IN 0.9% NACL 1 MG/10 ML
SYRINGE (ML) INTRAVENOUS AS NEEDED
Status: DISCONTINUED | OUTPATIENT
Start: 2022-01-01 | End: 2022-01-01 | Stop reason: SURG

## 2022-01-01 RX ORDER — CELECOXIB 100 MG/1
200 CAPSULE ORAL DAILY
Status: DISCONTINUED | OUTPATIENT
Start: 2022-01-01 | End: 2022-01-01 | Stop reason: HOSPADM

## 2022-01-01 RX ORDER — SODIUM CHLORIDE, SODIUM LACTATE, POTASSIUM CHLORIDE, CALCIUM CHLORIDE 600; 310; 30; 20 MG/100ML; MG/100ML; MG/100ML; MG/100ML
9 INJECTION, SOLUTION INTRAVENOUS CONTINUOUS PRN
Status: DISCONTINUED | OUTPATIENT
Start: 2022-01-01 | End: 2022-01-01 | Stop reason: HOSPADM

## 2022-01-01 RX ORDER — AMOXICILLIN 250 MG
2 CAPSULE ORAL 2 TIMES DAILY
COMMUNITY
Start: 2022-01-01 | End: 2022-01-01

## 2022-01-01 RX ORDER — LEVOTHYROXINE SODIUM 0.05 MG/1
TABLET ORAL
Qty: 30 TABLET | Refills: 0 | Status: SHIPPED | OUTPATIENT
Start: 2022-01-01 | End: 2022-01-01

## 2022-01-01 RX ORDER — FLUTICASONE PROPIONATE 50 MCG
2 SPRAY, SUSPENSION (ML) NASAL DAILY
Qty: 48 G | Refills: 1 | Status: SHIPPED | OUTPATIENT
Start: 2022-01-01 | End: 2022-01-01

## 2022-01-01 RX ORDER — HYDROCHLOROTHIAZIDE 25 MG/1
25 TABLET ORAL DAILY
Qty: 90 TABLET | Refills: 1 | Status: SHIPPED | OUTPATIENT
Start: 2022-01-01 | End: 2023-01-01

## 2022-01-01 RX ORDER — ONDANSETRON 4 MG/1
4 TABLET, FILM COATED ORAL EVERY 6 HOURS PRN
Status: DISCONTINUED | OUTPATIENT
Start: 2022-01-01 | End: 2022-01-01 | Stop reason: HOSPADM

## 2022-01-01 RX ORDER — DEXTROSE AND SODIUM CHLORIDE 5; .45 G/100ML; G/100ML
100 INJECTION, SOLUTION INTRAVENOUS CONTINUOUS
Status: DISCONTINUED | OUTPATIENT
Start: 2022-01-01 | End: 2022-01-01 | Stop reason: HOSPADM

## 2022-01-01 RX ORDER — LEVETIRACETAM 750 MG/1
750 TABLET ORAL 2 TIMES DAILY
Status: DISCONTINUED | OUTPATIENT
Start: 2022-01-01 | End: 2022-01-01 | Stop reason: HOSPADM

## 2022-01-01 RX ORDER — AMITRIPTYLINE HYDROCHLORIDE 25 MG/1
25 TABLET, FILM COATED ORAL DAILY
COMMUNITY
Start: 2022-01-01 | End: 2022-01-01 | Stop reason: SDUPTHER

## 2022-01-01 RX ORDER — GLYCOPYRROLATE 0.2 MG/ML
INJECTION INTRAMUSCULAR; INTRAVENOUS AS NEEDED
Status: DISCONTINUED | OUTPATIENT
Start: 2022-01-01 | End: 2022-01-01 | Stop reason: SURG

## 2022-01-01 RX ORDER — PANTOPRAZOLE SODIUM 40 MG/1
40 TABLET, DELAYED RELEASE ORAL
Status: DISCONTINUED | OUTPATIENT
Start: 2022-01-01 | End: 2022-01-01 | Stop reason: HOSPADM

## 2022-01-01 RX ORDER — AMITRIPTYLINE HYDROCHLORIDE 10 MG/1
10 TABLET, FILM COATED ORAL NIGHTLY
Qty: 90 TABLET | Refills: 1 | Status: SHIPPED | OUTPATIENT
Start: 2022-01-01 | End: 2022-01-01 | Stop reason: SDUPTHER

## 2022-01-01 RX ORDER — ONDANSETRON 2 MG/ML
4 INJECTION INTRAMUSCULAR; INTRAVENOUS ONCE AS NEEDED
Status: DISCONTINUED | OUTPATIENT
Start: 2022-01-01 | End: 2022-01-01 | Stop reason: HOSPADM

## 2022-01-01 RX ORDER — HYDROCODONE BITARTRATE AND ACETAMINOPHEN 5; 325 MG/1; MG/1
1 TABLET ORAL EVERY 4 HOURS PRN
Status: DISCONTINUED | OUTPATIENT
Start: 2022-01-01 | End: 2022-01-01 | Stop reason: HOSPADM

## 2022-01-01 RX ORDER — POTASSIUM CHLORIDE 20 MEQ/1
TABLET, EXTENDED RELEASE ORAL
Qty: 180 TABLET | Refills: 0 | Status: SHIPPED | OUTPATIENT
Start: 2022-01-01 | End: 2022-01-01 | Stop reason: SDUPTHER

## 2022-01-01 RX ORDER — ROCURONIUM BROMIDE 10 MG/ML
INJECTION, SOLUTION INTRAVENOUS AS NEEDED
Status: DISCONTINUED | OUTPATIENT
Start: 2022-01-01 | End: 2022-01-01 | Stop reason: SURG

## 2022-01-01 RX ORDER — MEPERIDINE HYDROCHLORIDE 25 MG/ML
12.5 INJECTION INTRAMUSCULAR; INTRAVENOUS; SUBCUTANEOUS
Status: DISCONTINUED | OUTPATIENT
Start: 2022-01-01 | End: 2022-01-01 | Stop reason: HOSPADM

## 2022-01-01 RX ORDER — FLUCONAZOLE 150 MG/1
150 TABLET ORAL
Qty: 2 TABLET | Refills: 0 | Status: SHIPPED | OUTPATIENT
Start: 2022-01-01 | End: 2022-01-01

## 2022-01-01 RX ORDER — HYDROCODONE BITARTRATE AND ACETAMINOPHEN 5; 325 MG/1; MG/1
1-2 TABLET ORAL EVERY 6 HOURS PRN
Qty: 20 TABLET | Refills: 0 | Status: SHIPPED | OUTPATIENT
Start: 2022-01-01 | End: 2022-01-01

## 2022-01-01 RX ORDER — FUROSEMIDE 40 MG/1
40 TABLET ORAL DAILY
Qty: 90 TABLET | Refills: 1 | Status: SHIPPED | OUTPATIENT
Start: 2022-01-01 | End: 2022-01-01

## 2022-01-01 RX ORDER — HYDROCHLOROTHIAZIDE 25 MG/1
25 TABLET ORAL DAILY
Status: DISCONTINUED | OUTPATIENT
Start: 2022-01-01 | End: 2022-01-01 | Stop reason: HOSPADM

## 2022-01-01 RX ORDER — LEVETIRACETAM 750 MG/1
TABLET ORAL
Qty: 180 TABLET | Refills: 0 | Status: SHIPPED | OUTPATIENT
Start: 2022-01-01

## 2022-01-01 RX ORDER — LISINOPRIL 20 MG/1
20 TABLET ORAL DAILY
Status: DISCONTINUED | OUTPATIENT
Start: 2022-01-01 | End: 2022-01-01 | Stop reason: HOSPADM

## 2022-01-01 RX ORDER — ACETAMINOPHEN 500 MG
1000 TABLET ORAL ONCE
Status: COMPLETED | OUTPATIENT
Start: 2022-01-01 | End: 2022-01-01

## 2022-01-01 RX ORDER — DAPAGLIFLOZIN 10 MG/1
10 TABLET, FILM COATED ORAL DAILY
Qty: 90 TABLET | Refills: 1 | Status: SHIPPED | OUTPATIENT
Start: 2022-01-01 | End: 2022-01-01

## 2022-01-01 RX ORDER — CLINDAMYCIN PHOSPHATE 600 MG/50ML
600 INJECTION INTRAVENOUS EVERY 8 HOURS
Status: COMPLETED | OUTPATIENT
Start: 2022-01-01 | End: 2022-01-01

## 2022-01-01 RX ORDER — LEVOTHYROXINE SODIUM 0.05 MG/1
50 TABLET ORAL DAILY
Qty: 90 TABLET | Refills: 1 | Status: SHIPPED | OUTPATIENT
Start: 2022-01-01 | End: 2023-01-01

## 2022-01-01 RX ORDER — LUBIPROSTONE 24 UG/1
24 CAPSULE ORAL 2 TIMES DAILY WITH MEALS
Qty: 90 CAPSULE | Refills: 1 | Status: SHIPPED | OUTPATIENT
Start: 2022-01-01 | End: 2022-01-01

## 2022-01-01 RX ORDER — ALPRAZOLAM 2 MG/1
2 TABLET ORAL 3 TIMES DAILY PRN
COMMUNITY
Start: 2022-01-01

## 2022-01-01 RX ORDER — ONDANSETRON 2 MG/ML
4 INJECTION INTRAMUSCULAR; INTRAVENOUS EVERY 6 HOURS PRN
Status: DISCONTINUED | OUTPATIENT
Start: 2022-01-01 | End: 2022-01-01 | Stop reason: HOSPADM

## 2022-01-01 RX ORDER — DEXAMETHASONE SODIUM PHOSPHATE 4 MG/ML
INJECTION, SOLUTION INTRA-ARTICULAR; INTRALESIONAL; INTRAMUSCULAR; INTRAVENOUS; SOFT TISSUE AS NEEDED
Status: DISCONTINUED | OUTPATIENT
Start: 2022-01-01 | End: 2022-01-01 | Stop reason: SURG

## 2022-01-01 RX ORDER — AMITRIPTYLINE HYDROCHLORIDE 25 MG/1
25 TABLET, FILM COATED ORAL NIGHTLY
Status: DISCONTINUED | OUTPATIENT
Start: 2022-01-01 | End: 2022-01-01 | Stop reason: HOSPADM

## 2022-01-01 RX ORDER — CLINDAMYCIN PHOSPHATE 900 MG/50ML
900 INJECTION INTRAVENOUS ONCE
Status: COMPLETED | OUTPATIENT
Start: 2022-01-01 | End: 2022-01-01

## 2022-01-01 RX ORDER — HYDROCODONE BITARTRATE AND ACETAMINOPHEN 5; 325 MG/1; MG/1
1 TABLET ORAL EVERY 4 HOURS PRN
Qty: 18 TABLET | Refills: 0 | Status: SHIPPED | OUTPATIENT
Start: 2022-01-01 | End: 2022-01-01

## 2022-01-01 RX ORDER — LANSOPRAZOLE 15 MG/1
15 CAPSULE, DELAYED RELEASE ORAL DAILY
Qty: 90 CAPSULE | Refills: 1 | Status: SHIPPED | OUTPATIENT
Start: 2022-01-01 | End: 2023-01-01

## 2022-01-01 RX ORDER — ALBUTEROL SULFATE 90 UG/1
2 AEROSOL, METERED RESPIRATORY (INHALATION) EVERY 4 HOURS PRN
Status: DISCONTINUED | OUTPATIENT
Start: 2022-01-01 | End: 2022-01-01 | Stop reason: HOSPADM

## 2022-01-01 RX ORDER — LEVOTHYROXINE SODIUM 0.05 MG/1
50 TABLET ORAL DAILY
Status: DISCONTINUED | OUTPATIENT
Start: 2022-01-01 | End: 2022-01-01 | Stop reason: HOSPADM

## 2022-01-01 RX ORDER — FLUTICASONE PROPIONATE 50 MCG
2 SPRAY, SUSPENSION (ML) NASAL DAILY
COMMUNITY
Start: 2022-01-01 | End: 2022-01-01 | Stop reason: SDUPTHER

## 2022-01-01 RX ORDER — PROMETHAZINE HYDROCHLORIDE 12.5 MG/1
25 TABLET ORAL ONCE AS NEEDED
Status: DISCONTINUED | OUTPATIENT
Start: 2022-01-01 | End: 2022-01-01 | Stop reason: HOSPADM

## 2022-01-01 RX ORDER — GUAIFENESIN 600 MG/1
1200 TABLET, EXTENDED RELEASE ORAL 2 TIMES DAILY
Qty: 180 TABLET | Refills: 1 | Status: SHIPPED | OUTPATIENT
Start: 2022-01-01 | End: 2022-01-01

## 2022-01-01 RX ORDER — ALBUTEROL SULFATE 90 UG/1
2 AEROSOL, METERED RESPIRATORY (INHALATION) EVERY 4 HOURS PRN
Qty: 54 G | Refills: 1 | Status: SHIPPED | OUTPATIENT
Start: 2022-01-01

## 2022-01-01 RX ORDER — POTASSIUM CHLORIDE 20 MEQ/1
20 TABLET, EXTENDED RELEASE ORAL 2 TIMES DAILY
Qty: 180 TABLET | Refills: 1 | Status: SHIPPED | OUTPATIENT
Start: 2022-01-01

## 2022-01-01 RX ORDER — OXYCODONE AND ACETAMINOPHEN 7.5; 325 MG/1; MG/1
TABLET ORAL
COMMUNITY
Start: 2022-01-01 | End: 2022-01-01

## 2022-01-01 RX ORDER — EPHEDRINE SULFATE 50 MG/ML
INJECTION, SOLUTION INTRAVENOUS AS NEEDED
Status: DISCONTINUED | OUTPATIENT
Start: 2022-01-01 | End: 2022-01-01 | Stop reason: SURG

## 2022-01-01 RX ORDER — POTASSIUM CHLORIDE 20 MEQ/1
TABLET, EXTENDED RELEASE ORAL
Qty: 180 TABLET | Refills: 1 | OUTPATIENT
Start: 2022-01-01

## 2022-01-01 RX ORDER — AMITRIPTYLINE HYDROCHLORIDE 25 MG/1
25 TABLET, FILM COATED ORAL NIGHTLY
Qty: 90 TABLET | Refills: 1 | Status: SHIPPED | OUTPATIENT
Start: 2022-01-01

## 2022-01-01 RX ORDER — HYDROCHLOROTHIAZIDE 25 MG/1
TABLET ORAL
Qty: 90 TABLET | Refills: 1 | OUTPATIENT
Start: 2022-01-01

## 2022-01-01 RX ORDER — HYDROCODONE BITARTRATE AND ACETAMINOPHEN 5; 325 MG/1; MG/1
1 TABLET ORAL EVERY 6 HOURS PRN
Qty: 20 TABLET | Refills: 0 | Status: SHIPPED | OUTPATIENT
Start: 2022-01-01 | End: 2022-01-01 | Stop reason: SDUPTHER

## 2022-01-01 RX ORDER — MAGNESIUM HYDROXIDE 1200 MG/15ML
LIQUID ORAL AS NEEDED
Status: DISCONTINUED | OUTPATIENT
Start: 2022-01-01 | End: 2022-01-01 | Stop reason: HOSPADM

## 2022-01-01 RX ORDER — CELECOXIB 200 MG/1
200 CAPSULE ORAL DAILY
Qty: 90 CAPSULE | Refills: 1 | Status: SHIPPED | OUTPATIENT
Start: 2022-01-01

## 2022-01-01 RX ADMIN — CLINDAMYCIN IN 5 PERCENT DEXTROSE 900 MG: 18 INJECTION, SOLUTION INTRAVENOUS at 13:43

## 2022-01-01 RX ADMIN — ALPRAZOLAM 2 MG: 1 TABLET ORAL at 05:08

## 2022-01-01 RX ADMIN — Medication 100 MCG: at 14:58

## 2022-01-01 RX ADMIN — ALPRAZOLAM 2 MG: 1 TABLET ORAL at 20:34

## 2022-01-01 RX ADMIN — LEVETIRACETAM 750 MG: 750 TABLET, FILM COATED ORAL at 08:20

## 2022-01-01 RX ADMIN — CLINDAMYCIN PHOSPHATE 600 MG: 600 INJECTION, SOLUTION INTRAVENOUS at 05:09

## 2022-01-01 RX ADMIN — ROCURONIUM BROMIDE 20 MG: 10 INJECTION INTRAVENOUS at 13:55

## 2022-01-01 RX ADMIN — Medication 100 MG: at 13:45

## 2022-01-01 RX ADMIN — MORPHINE SULFATE 4 MG: 4 INJECTION, SOLUTION INTRAMUSCULAR; INTRAVENOUS at 05:21

## 2022-01-01 RX ADMIN — EPHEDRINE SULFATE 15 MG: 50 INJECTION INTRAVENOUS at 14:07

## 2022-01-01 RX ADMIN — PROPOFOL 200 MG: 10 INJECTION, EMULSION INTRAVENOUS at 13:45

## 2022-01-01 RX ADMIN — HYDROCHLOROTHIAZIDE 25 MG: 25 TABLET ORAL at 08:21

## 2022-01-01 RX ADMIN — OXYCODONE HYDROCHLORIDE 5 MG: 5 TABLET ORAL at 17:12

## 2022-01-01 RX ADMIN — GLYCOPYRROLATE 0.2 MG: 0.2 INJECTION INTRAMUSCULAR; INTRAVENOUS at 14:37

## 2022-01-01 RX ADMIN — AMITRIPTYLINE HYDROCHLORIDE 25 MG: 25 TABLET, FILM COATED ORAL at 20:34

## 2022-01-01 RX ADMIN — DOCUSATE SODIUM 100 MG: 100 CAPSULE, LIQUID FILLED ORAL at 08:20

## 2022-01-01 RX ADMIN — ONDANSETRON 4 MG: 2 INJECTION INTRAMUSCULAR; INTRAVENOUS at 15:58

## 2022-01-01 RX ADMIN — HYDROMORPHONE HYDROCHLORIDE 0.5 MG: 1 INJECTION, SOLUTION INTRAMUSCULAR; INTRAVENOUS; SUBCUTANEOUS at 16:54

## 2022-01-01 RX ADMIN — SUGAMMADEX 200 MG: 100 INJECTION, SOLUTION INTRAVENOUS at 16:21

## 2022-01-01 RX ADMIN — DEXTROSE AND SODIUM CHLORIDE 100 ML/HR: 5; 450 INJECTION, SOLUTION INTRAVENOUS at 05:09

## 2022-01-01 RX ADMIN — HYDROCODONE BITARTRATE AND ACETAMINOPHEN 1 TABLET: 5; 325 TABLET ORAL at 20:38

## 2022-01-01 RX ADMIN — HYDROMORPHONE HYDROCHLORIDE 1 MG: 1 INJECTION, SOLUTION INTRAMUSCULAR; INTRAVENOUS; SUBCUTANEOUS at 16:27

## 2022-01-01 RX ADMIN — FUROSEMIDE 40 MG: 40 TABLET ORAL at 08:21

## 2022-01-01 RX ADMIN — PROPOFOL 150 MCG/KG/MIN: 10 INJECTION, EMULSION INTRAVENOUS at 13:46

## 2022-01-01 RX ADMIN — EPHEDRINE SULFATE 15 MG: 50 INJECTION INTRAVENOUS at 14:00

## 2022-01-01 RX ADMIN — DOCUSATE SODIUM 100 MG: 100 CAPSULE, LIQUID FILLED ORAL at 20:34

## 2022-01-01 RX ADMIN — ROCURONIUM BROMIDE 5 MG: 10 INJECTION INTRAVENOUS at 13:45

## 2022-01-01 RX ADMIN — LIDOCAINE HYDROCHLORIDE 50 MG: 20 INJECTION, SOLUTION EPIDURAL; INFILTRATION; INTRACAUDAL; PERINEURAL at 13:50

## 2022-01-01 RX ADMIN — LEVOTHYROXINE SODIUM 50 MCG: 50 TABLET ORAL at 08:21

## 2022-01-01 RX ADMIN — ROCURONIUM BROMIDE 30 MG: 10 INJECTION INTRAVENOUS at 14:28

## 2022-01-01 RX ADMIN — PANTOPRAZOLE SODIUM 40 MG: 40 TABLET, DELAYED RELEASE ORAL at 05:09

## 2022-01-01 RX ADMIN — SODIUM CHLORIDE, POTASSIUM CHLORIDE, SODIUM LACTATE AND CALCIUM CHLORIDE: 600; 310; 30; 20 INJECTION, SOLUTION INTRAVENOUS at 15:50

## 2022-01-01 RX ADMIN — Medication 100 MCG: at 14:39

## 2022-01-01 RX ADMIN — HYDROCODONE BITARTRATE AND ACETAMINOPHEN 1 TABLET: 5; 325 TABLET ORAL at 07:41

## 2022-01-01 RX ADMIN — CLINDAMYCIN PHOSPHATE 600 MG: 600 INJECTION, SOLUTION INTRAVENOUS at 22:31

## 2022-01-01 RX ADMIN — DEXAMETHASONE SODIUM PHOSPHATE 4 MG: 4 INJECTION, SOLUTION INTRA-ARTICULAR; INTRALESIONAL; INTRAMUSCULAR; INTRAVENOUS; SOFT TISSUE at 13:55

## 2022-01-01 RX ADMIN — LIDOCAINE HYDROCHLORIDE 50 MG: 20 INJECTION, SOLUTION EPIDURAL; INFILTRATION; INTRACAUDAL; PERINEURAL at 13:45

## 2022-01-01 RX ADMIN — MIDAZOLAM HYDROCHLORIDE 2 MG: 1 INJECTION, SOLUTION INTRAMUSCULAR; INTRAVENOUS at 13:34

## 2022-01-01 RX ADMIN — ACETAMINOPHEN 1000 MG: 500 TABLET, FILM COATED ORAL at 12:14

## 2022-01-01 RX ADMIN — HYDROCODONE BITARTRATE AND ACETAMINOPHEN 1 TABLET: 5; 325 TABLET ORAL at 01:57

## 2022-01-01 RX ADMIN — LEVETIRACETAM 750 MG: 750 TABLET, FILM COATED ORAL at 20:34

## 2022-01-01 RX ADMIN — ROCURONIUM BROMIDE 20 MG: 10 INJECTION INTRAVENOUS at 16:05

## 2022-01-01 RX ADMIN — EPHEDRINE SULFATE 15 MG: 50 INJECTION INTRAVENOUS at 13:57

## 2022-01-01 RX ADMIN — DEXTROSE AND SODIUM CHLORIDE 100 ML/HR: 5; 450 INJECTION, SOLUTION INTRAVENOUS at 18:41

## 2022-01-01 RX ADMIN — LISINOPRIL 20 MG: 20 TABLET ORAL at 08:20

## 2022-01-01 RX ADMIN — LAMOTRIGINE 25 MG: 25 TABLET ORAL at 08:20

## 2022-01-01 RX ADMIN — SODIUM CHLORIDE, POTASSIUM CHLORIDE, SODIUM LACTATE AND CALCIUM CHLORIDE 9 ML/HR: 600; 310; 30; 20 INJECTION, SOLUTION INTRAVENOUS at 12:14

## 2022-01-01 RX ADMIN — EPHEDRINE SULFATE 15 MG: 50 INJECTION INTRAVENOUS at 14:36

## 2022-01-01 RX ADMIN — HYDROMORPHONE HYDROCHLORIDE 0.5 MG: 1 INJECTION, SOLUTION INTRAMUSCULAR; INTRAVENOUS; SUBCUTANEOUS at 16:43

## 2022-01-01 RX ADMIN — MEPERIDINE HYDROCHLORIDE 12.5 MG: 25 INJECTION INTRAMUSCULAR; INTRAVENOUS; SUBCUTANEOUS at 16:42

## 2022-01-01 RX ADMIN — Medication 150 MCG: at 14:09

## 2022-01-01 RX ADMIN — OXYCODONE HYDROCHLORIDE 5 MG: 5 TABLET ORAL at 16:42

## 2022-01-01 RX ADMIN — DULOXETINE 60 MG: 30 CAPSULE, DELAYED RELEASE ORAL at 08:21

## 2022-01-01 RX ADMIN — CELECOXIB 200 MG: 100 CAPSULE ORAL at 08:21

## 2022-01-01 RX ADMIN — FENTANYL CITRATE 100 MCG: 50 INJECTION, SOLUTION INTRAMUSCULAR; INTRAVENOUS at 13:45

## 2022-01-14 RX ORDER — HYDROCHLOROTHIAZIDE 12.5 MG/1
CAPSULE, GELATIN COATED ORAL
Qty: 90 CAPSULE | Refills: 0 | Status: SHIPPED | OUTPATIENT
Start: 2022-01-14 | End: 2022-01-01

## 2022-01-18 ENCOUNTER — TELEPHONE (OUTPATIENT)
Dept: FAMILY MEDICINE CLINIC | Facility: CLINIC | Age: 57
End: 2022-01-18

## 2022-01-18 NOTE — TELEPHONE ENCOUNTER
I called pt's  and talked to him.  We all agree that after seeing the surgeon next best step is to get the back surgery and then continue forward with genetic testing and recheck mammogram in 1 yr after last mammogram.  Pt is making the right decision from my medical opinion at this time.   will let pt know this and let me know if they need anything further in the near future.    Caller: Lidia Yeung    Relationship: Self    Best call back number:[phone] OR THE HOME NUMBER [phone]. PLEASE SPEAK TO HER  THERESA. IT IS DIFFICULT FOR HER TO GET AROUND RIGHT NOW. SHE IS AT [facility]. HER  CAN SET UP APPOINTMENTS AND SPEAK FOR HER.      What is the best time to reach you: BEST TO CALL HER  THERESA BECAUSE SHE IS OVERWHELMED ABOUT THE CANCER SCARE.     Who are you requesting to speak with (clinical staff, provider,  specific staff member): CLINICAL    Do you know the name of the person who called: N/A    What was the call regarding: PATIENT WANTED YOU TO KNOW SHE IS HAVING BACK SURGERY ON 01/25/2022 AND ALSO ON 01/27/2022. DR NEWBY AT [facility] IN [place].  THEY FOUND SEVERAL ISSUES IN HER SPINE AND FOUND ERRORS FROM HER PREVIOUS SURGERY.  SHE NEEDS TO PREPARE FOR THIS SURGERY. IT HAS HAD TO BE DELAYED BEFORE DUE TO HEALTH REASONS YOU ARE AWARE OF.    SHE KNOWS SHE NEEDS TO SEE A PODIATRIST BUT HAS OTHER MORE PRESSING NEEDS TO BE TAKEN CARE OF FIRST.  PLEASE CALL PATIENT'S  THERESA  BACK AND ADVISE REGARDING THIS SURGERY AND HER OTHER HEALTH PROBLEMS.    Do you require a callback: YES

## 2022-02-03 RX ORDER — LEVETIRACETAM 750 MG/1
TABLET ORAL
Qty: 180 TABLET | Refills: 0 | Status: SHIPPED | OUTPATIENT
Start: 2022-02-03 | End: 2022-01-01

## 2022-02-13 ENCOUNTER — HOSPITAL ENCOUNTER (EMERGENCY)
Facility: HOSPITAL | Age: 57
Discharge: HOME | End: 2022-02-13
Attending: EMERGENCY MEDICINE | Admitting: EMERGENCY MEDICINE

## 2022-02-13 ENCOUNTER — APPOINTMENT (OUTPATIENT)
Dept: GENERAL RADIOLOGY | Facility: HOSPITAL | Age: 57
End: 2022-02-13

## 2022-02-13 VITALS
WEIGHT: 201.06 LBS | HEIGHT: 64 IN | BODY MASS INDEX: 34.33 KG/M2 | SYSTOLIC BLOOD PRESSURE: 174 MMHG | DIASTOLIC BLOOD PRESSURE: 91 MMHG | RESPIRATION RATE: 18 BRPM | OXYGEN SATURATION: 97 % | TEMPERATURE: 98.5 F | HEART RATE: 91 BPM

## 2022-02-13 DIAGNOSIS — S49.91XA SHOULDER INJURY, RIGHT, INITIAL ENCOUNTER: Primary | ICD-10-CM

## 2022-02-13 PROCEDURE — 25010000002 KETOROLAC TROMETHAMINE PER 15 MG: Performed by: PHYSICIAN ASSISTANT

## 2022-02-13 PROCEDURE — 73030 X-RAY EXAM OF SHOULDER: CPT

## 2022-02-13 PROCEDURE — 96372 THER/PROPH/DIAG INJ SC/IM: CPT

## 2022-02-13 PROCEDURE — 73060 X-RAY EXAM OF HUMERUS: CPT

## 2022-02-13 PROCEDURE — 99282 EMERGENCY DEPT VISIT SF MDM: CPT

## 2022-02-13 RX ORDER — KETOROLAC TROMETHAMINE 10 MG/1
10 TABLET, FILM COATED ORAL EVERY 6 HOURS PRN
Qty: 15 TABLET | Refills: 0 | Status: SHIPPED | OUTPATIENT
Start: 2022-02-13 | End: 2022-01-01

## 2022-02-13 RX ORDER — KETOROLAC TROMETHAMINE 30 MG/ML
30 INJECTION, SOLUTION INTRAMUSCULAR; INTRAVENOUS ONCE
Status: COMPLETED | OUTPATIENT
Start: 2022-02-13 | End: 2022-02-13

## 2022-02-13 RX ADMIN — KETOROLAC TROMETHAMINE 30 MG: 30 INJECTION, SOLUTION INTRAMUSCULAR; INTRAVENOUS at 18:15

## 2022-02-13 NOTE — ED PROVIDER NOTES
Subjective   Pt presents after possible seizure 3 weeks ago. Has hx of seizures and does not want any work up further on that today per her and .   States she is here to work up shoulder pain from injury.  Knows that she fell to ground but unsure what exactly happened to arm.   Complains of right shoulder pain that goes down into right hand. Pain with movement of it above head and forward in front of body. No surgeries to that area. Denies neck pain that is out of her normal.       History provided by:  Patient  Shoulder Injury  Location:  R  Severity:  Moderate  Onset quality:  Sudden  Duration:  3 weeks  Timing:  Intermittent  Progression:  Worsening  Chronicity:  New  Associated symptoms: no fatigue and no fever        Review of Systems   Constitutional: Negative for appetite change, chills, fatigue and fever.   HENT: Negative.    Eyes: Negative.  Negative for photophobia.   Respiratory: Negative.    Gastrointestinal: Negative.    Endocrine: Negative.    Genitourinary: Negative.    Musculoskeletal: Positive for joint swelling.   Skin: Negative.    Allergic/Immunologic: Negative.  Negative for immunocompromised state.   Neurological: Negative.    Hematological: Negative.    Psychiatric/Behavioral: Negative.    All other systems reviewed and are negative.      Past Medical History:   Diagnosis Date   • Benign essential hypertension    • Chronic lower back pain    • Cluster headache    • Depression    • Difficulty walking    • Disc degeneration, lumbar    • Elevated fasting glucose    • Elevated transaminase level    • Fatigue    • Headache, tension-type    • Hypothyroidism    • Kidney stones    • Memory loss    • Migraine    • Seizures (HCC)        Allergies   Allergen Reactions   • Atorvastatin    • Atorvastatin Calcium Swelling   • Ciprofloxacin Itching     FEVER,CHILLS   • Erythromycin    • Fentanyl Unknown - Low Severity   • Nsaids Unknown - Low Severity   • Penicillins Unknown - Low Severity   •  Rosuvastatin    • Statins Unknown - Low Severity       History reviewed. No pertinent surgical history.    Family History   Problem Relation Age of Onset   • Brain cancer Mother    • Arthritis Mother    • Bleeding Disorder Mother    • Diabetes Mother    • Hypertension Mother    • Other Mother         malignant neoplasm   • Migraines Mother    • Thyroid disease Mother    • Arthritis Father    • Hypertension Father    • Other Father         malignant neoplasm   • Migraines Father    • Thyroid disease Father    • Breast cancer Maternal Aunt    • Arthritis Maternal Grandmother    • Diabetes Maternal Grandmother        Social History     Socioeconomic History   • Marital status:    Tobacco Use   • Smoking status: Current Every Day Smoker     Packs/day: 1.00     Types: Cigarettes   • Smokeless tobacco: Never Used   Substance and Sexual Activity   • Alcohol use: Yes     Comment: occasionally   • Drug use: Defer   • Sexual activity: Defer           Objective   Physical Exam  Vitals and nursing note reviewed.   Constitutional:       General: She is not in acute distress.     Appearance: Normal appearance. She is not ill-appearing or toxic-appearing.   HENT:      Head: Normocephalic and atraumatic.   Pulmonary:      Effort: Pulmonary effort is normal.   Musculoskeletal:        Arms:    Neurological:      Mental Status: She is alert and oriented to person, place, and time. Mental status is at baseline.   Psychiatric:         Mood and Affect: Mood normal.         Behavior: Behavior normal.         Thought Content: Thought content normal.         Judgment: Judgment normal.       Narrative & Impression  PROCEDURE:  XR SHOULDER 2+ VW RIGHT     COMPARISON: [facility], CR, SHOULDER >OR= 2V RT, 8/01/2020, 23:49.     INDICATIONS:  FELL TWO WEEKS AGO WHILE HAVING A SEIZURE COMPLAINS OF PAIN AND LIMIT RANGE OF MOTION   IN RIGHT ARM     FINDINGS:          No acute fracture or dislocation is identified.  No erosions.   No periostitis.  No suspicious focal   lesions identified.  Mild acromioclavicular and glenohumeral osteoarthritic changes are present.    The soft tissues are unremarkable.     IMPRESSION:                 No acute osseous abnormality.  Mild acromioclavicular and glenohumeral osteoarthritic changes are   present..            JARROD MÉNDEZ MD         Electronically Signed and Approved By: JARROD MÉNDEZ MD on 2/13/2022 at 18:34            Narrative & Impression  PROCEDURE:  XR HUMERUS RIGHT     COMPARISON: None     INDICATIONS:  FELL TWO WEEKS AGO WHILE HAVING A SEIZURE COMPLAINS OF PAIN AND LIMIT RANGE OF MOTION   IN RIGHT ARM     FINDINGS:          No acute fracture or dislocation is identified.  No erosions.  No periostitis.  No suspicious focal   lesions identified.  The soft tissues are unremarkable.     IMPRESSION:                 No acute osseous abnormality.            JARROD MÉNDEZ MD         Electronically Signed and Approved By: JARROD MÉNDEZ MD on 2/13/2022 at 18:34              MDM  Number of Diagnoses or Management Options     Amount and/or Complexity of Data Reviewed  Tests in the radiology section of CPT®: reviewed        Final diagnoses:   Shoulder injury, right, initial encounter       ED Disposition  ED Disposition     ED Disposition Condition Comment    Discharge Stable           Rafael Mi MD  [address] [place] [address] [phone]          Been, Misael CARRERO MD  [address]  [place] [address] [phone]               Medication List      New Prescriptions    ketorolac 10 MG tablet  Commonly known as: TORADOL  Take 1 tablet by mouth Every 6 (Six) Hours As Needed for Moderate Pain .           Where to Get Your Medications      These medications were sent to [id number] - [place], [place] - [address] AT [facility] [address] [facility] & [place] - [phone] [facility] [phone] FX  [address] [place] [address]    Phone: [phone]   · ketorolac 10  MG tablet          Kwame Magaña PA  02/13/22 1847

## 2022-03-15 NOTE — TELEPHONE ENCOUNTER
Caller: Gwendolyn Yeung    Relationship: Emergency Contact    Best call back number:[phone]    What is the best time to reach you: ANY     Who are you requesting to speak with (clinical staff, provider,  specific staff member): CLINICAL     What was the call regarding: PATIENT CALLED IN AND WOULD LIKE A CALLBACK ABOUT FMLA PAPERWORK THAT WAS DROPPED OFF 10 DAYS AGO     Do you require a callback: YES

## 2022-03-22 NOTE — PROGRESS NOTES
Chief Complaint  Back Pain (Scheduled for surgery mid April.  Here to discuss med refills)    Subjective          Lidia Yeung presents to [facility]  Pt scheduled for back surgery coming up in April 2022    Pt has genetics testing and has markers for hereditary increased risk of breast cancer    Filled out husbands FMLA for home care for pt after her surgery    Hypertension  This is a chronic problem. The current episode started more than 1 year ago. The problem has been gradually worsening since onset. The problem is uncontrolled. Pertinent negatives include no anxiety, blurred vision, chest pain, headaches, malaise/fatigue, neck pain, orthopnea, palpitations, peripheral edema, PND, shortness of breath or sweats. There are no associated agents to hypertension. Risk factors for coronary artery disease include diabetes mellitus, dyslipidemia and family history. Current antihypertension treatment includes ACE inhibitors and diuretics. The current treatment provides mild improvement. There are no compliance problems.        Objective   Allergies   Allergen Reactions   • Atorvastatin    • Atorvastatin Calcium Swelling   • Ciprofloxacin Itching     FEVER,CHILLS   • Erythromycin    • Fentanyl Unknown - Low Severity   • Nsaids Unknown - Low Severity   • Penicillins Unknown - Low Severity   • Rosuvastatin    • Statins Unknown - Low Severity     Immunization History   Administered Date(s) Administered   • COVID-19 (MODERNA) 1st, 2nd, 3rd Dose Only 05/15/2021, 06/23/2021   • FluMist 2-49yrs 10/03/2014     Past Medical History:   Diagnosis Date   • Benign essential hypertension    • Chronic lower back pain    • Cluster headache    • Depression    • Difficulty walking    • Disc degeneration, lumbar    • Elevated fasting glucose    • Elevated transaminase level    • Fatigue    • Headache, tension-type    • Hypothyroidism    • Kidney stones    • Memory loss    • Migraine    • Seizures (HCC)        No past surgical history on file.   Social History     Socioeconomic History   • Marital status:    Tobacco Use   • Smoking status: Current Every Day Smoker     Packs/day: 1.00     Types: Cigarettes   • Smokeless tobacco: Never Used   Substance and Sexual Activity   • Alcohol use: Yes     Comment: occasionally   • Drug use: Defer   • Sexual activity: Defer        Current Outpatient Medications:   •  albuterol sulfate  (90 Base) MCG/ACT inhaler, Inhale 2 puffs Every 4 (Four) Hours As Needed for Wheezing., Disp: 54 g, Rfl: 1  •  ALPRAZolam (XANAX) 1 MG tablet, Take by mouth., Disp: , Rfl:   •  Biotin 33266 MCG tablet, Take  by mouth., Disp: , Rfl:   •  celecoxib (CeleBREX) 200 MG capsule, Take 1 capsule by mouth Daily., Disp: 90 capsule, Rfl: 1  •  DULoxetine (CYMBALTA) 60 MG capsule, Take 60 mg by mouth Daily., Disp: , Rfl:   •  fluticasone (FLONASE) 50 MCG/ACT nasal spray, 2 sprays by Each Nare route Daily., Disp: 48 g, Rfl: 1  •  furosemide (LASIX) 40 MG tablet, Take 1 tablet by mouth Daily., Disp: 90 tablet, Rfl: 1  •  ibuprofen (ADVIL,MOTRIN) 800 MG tablet, Take 800 mg by mouth Every 6 (Six) Hours As Needed for Mild Pain ., Disp: , Rfl:   •  ipratropium-albuterol (DUO-NEB) 0.5-2.5 mg/mL nebulizer, Ipratropium-Albuterol 0.5-2.5 (3) MG/3ML Inhalation Solution; Patient Sig: Ipratropium-Albuterol 0.5-2.5 (3) MG/3ML Inhalation Solution ; 360; 0; 20-Oct-2015; Active, Disp: , Rfl:   •  lansoprazole (PREVACID) 15 MG capsule, Take 1 capsule by mouth Daily., Disp: 90 capsule, Rfl: 1  •  levETIRAcetam (KEPPRA) 750 MG tablet, TAKE 1 TABLET TWICE DAILY, Disp: 180 tablet, Rfl: 0  •  levothyroxine (SYNTHROID, LEVOTHROID) 50 MCG tablet, Take 1 tablet by mouth Daily., Disp: 90 tablet, Rfl: 1  •  linaclotide (LINZESS) 290 MCG capsule capsule, Take 1 capsule by mouth Every Morning Before Breakfast., Disp: 90 capsule, Rfl: 1  •  lisinopril (PRINIVIL,ZESTRIL) 20 MG tablet, Take 1 tablet by mouth Daily., Disp: 90  tablet, Rfl: 1  •  Multiple Vitamins-Minerals (MULTIVITAMIN ADULT PO), Take  by mouth., Disp: , Rfl:   •  Omega 3 1000 MG capsule, Take  by mouth., Disp: , Rfl:   •  polyethylene glycol (MIRALAX) powder, MIX 17 GRAMS (1 CAPFUL) IN 8 OUNCES OF LIQUID AND DRINK TWICE DAILY AS NEEDED., Disp: 1054 g, Rfl: 3  •  potassium chloride (K-DUR,KLOR-CON) 20 MEQ CR tablet, TAKE 1 TABLET TWICE DAILY, Disp: 60 tablet, Rfl: 5  •  vitamin E 400 UNIT capsule, Take 400 Units by mouth Daily., Disp: , Rfl:   •  amitriptyline (ELAVIL) 10 MG tablet, Take 1 tablet by mouth Every Night., Disp: 90 tablet, Rfl: 1  •  Dapagliflozin Propanediol (Farxiga) 10 MG tablet, Take 10 mg by mouth Daily., Disp: 90 tablet, Rfl: 1  •  guaiFENesin (Mucinex) 600 MG 12 hr tablet, Take 2 tablets by mouth 2 (Two) Times a Day., Disp: 180 tablet, Rfl: 1  •  hydroCHLOROthiazide (HYDRODIURIL) 25 MG tablet, Take 1 tablet by mouth Daily., Disp: 90 tablet, Rfl: 1  •  ketorolac (TORADOL) 10 MG tablet, Take 1 tablet by mouth Every 6 (Six) Hours As Needed for Moderate Pain ., Disp: 15 tablet, Rfl: 0  •  lamoTRIgine (LaMICtal) 25 MG tablet, Take by mouth., Disp: , Rfl:   •  lubiprostone (AMITIZA) 24 MCG capsule, Take 1 capsule by mouth 2 (Two) Times a Day With Meals., Disp: 90 capsule, Rfl: 1  •  oxyCODONE HCl ER (oxyCONTIN) 60 MG 12 hr tablet, Take by mouth., Disp: , Rfl:   •  Pitavastatin Calcium 4 MG tablet, Take 1 tablet by mouth daily., Disp: , Rfl:   •  saccharomyces boulardii (FLORASTOR) 250 MG capsule, Take 1 capsule by mouth 2 (two) times a day., Disp: , Rfl:    Family History   Problem Relation Age of Onset   • Brain cancer Mother    • Arthritis Mother    • Bleeding Disorder Mother    • Diabetes Mother    • Hypertension Mother    • Other Mother         malignant neoplasm   • Migraines Mother    • Thyroid disease Mother    • Arthritis Father    • Hypertension Father    • Other Father         malignant neoplasm   • Migraines Father    • Thyroid disease Father    •  "Breast cancer Maternal Aunt    • Arthritis Maternal Grandmother    • Diabetes Maternal Grandmother           Vital Signs:   Vitals:    03/22/22 1639   BP: 162/74   BP Location: Left arm   Patient Position: Sitting   Cuff Size: Adult   Pulse: 117   Temp: 97.2 °F (36.2 °C)   TempSrc: Temporal   SpO2: 97%   Weight: 88.1 kg (194 lb 3.2 oz)   Height: 162.6 cm (64\")       Review of Systems   Constitutional: Negative for malaise/fatigue.   Eyes: Negative for blurred vision.   Respiratory: Negative for shortness of breath.    Cardiovascular: Negative for chest pain, palpitations, orthopnea and PND.   Musculoskeletal: Negative for neck pain.   Neurological: Negative for headaches.      Physical Exam  Vitals reviewed.   Constitutional:       Appearance: Normal appearance. She is well-developed.   HENT:      Head: Normocephalic and atraumatic.      Right Ear: External ear normal.      Left Ear: External ear normal.      Mouth/Throat:      Pharynx: No oropharyngeal exudate.   Eyes:      Conjunctiva/sclera: Conjunctivae normal.      Pupils: Pupils are equal, round, and reactive to light.   Cardiovascular:      Rate and Rhythm: Normal rate and regular rhythm.      Pulses: Normal pulses.      Heart sounds: Normal heart sounds. No murmur heard.    No friction rub. No gallop.   Pulmonary:      Effort: Pulmonary effort is normal.      Breath sounds: Normal breath sounds. No wheezing or rhonchi.   Abdominal:      General: Abdomen is flat. Bowel sounds are normal. There is no distension.      Palpations: Abdomen is soft. There is no mass.      Tenderness: There is no abdominal tenderness. There is no guarding or rebound.      Hernia: No hernia is present.   Musculoskeletal:         General: Normal range of motion.      Cervical back: Normal range of motion and neck supple.   Skin:     General: Skin is warm and dry.      Capillary Refill: Capillary refill takes less than 2 seconds.   Neurological:      General: No focal deficit present. "      Mental Status: She is alert and oriented to person, place, and time.      Cranial Nerves: No cranial nerve deficit.   Psychiatric:         Mood and Affect: Mood and affect normal.         Behavior: Behavior normal.         Thought Content: Thought content normal.         Judgment: Judgment normal.        Result Review :   The following data was reviewed by: Rafael Mi MD on 03/22/2022:  CMP    CMP 4/16/21 10/12/21   Glucose 110 (A) 91   BUN 14 12   Creatinine 0.85 0.94   eGFR Non African Am  61   Sodium 147 141   Potassium 3.9 4.0   Chloride 103 100   Calcium 10.2 10.1   Albumin 5.0 5.10   Total Bilirubin 0.38 0.4   Alkaline Phosphatase 87 92   AST (SGOT) 23 21   ALT (SGPT) 19 19   (A) Abnormal value            CBC    CBC 5/1/21 10/12/21 1/11/22   WBC 7.69 10.74 7.92   RBC 4.79 4.31 4.46   Hemoglobin 15.2 14.2 14.2   Hematocrit 45.1 41.3 42.1   MCV 94.2 95.8 94.4   MCH 31.7 32.9 31.8   MCHC 33.7 34.4 33.7   RDW 12.6 12.1 (A) 12.6   Platelets 258 241 234   (A) Abnormal value            Lipid Panel    Lipid Panel 10/12/21   Total Cholesterol 298 (A)   Triglycerides 292 (A)   HDL Cholesterol 48   VLDL Cholesterol 57 (A)   LDL Cholesterol  193 (A)   LDL/HDL Ratio 3.99   (A) Abnormal value            TSH    TSH 10/12/21   TSH 1.650           Most Recent A1C    HGBA1C Most Recent 10/12/21   Hemoglobin A1C 5.89 (A)   (A) Abnormal value            Microalbumin    Microalbumin 10/12/21   Microalbumin, Urine <1.2                     Assessment and Plan    Diagnoses and all orders for this visit:    1. Chronic back pain, unspecified back location, unspecified back pain laterality (Primary)  -     amitriptyline (ELAVIL) 10 MG tablet; Take 1 tablet by mouth Every Night.  Dispense: 90 tablet; Refill: 1    2. Primary hypertension  -     hydroCHLOROthiazide (HYDRODIURIL) 25 MG tablet; Take 1 tablet by mouth Daily.  Dispense: 90 tablet; Refill: 1            Follow Up   No follow-ups on file.  Patient was given instructions  and counseling regarding her condition or for health maintenance advice. Please see specific information pulled into the AVS if appropriate.

## 2022-03-24 NOTE — TELEPHONE ENCOUNTER
Caller: Lidia Yeung    Relationship to patient: Self    Best call back number: [phone]    Patient is needing: MEDICATION PRESCRIBED YESTERDAY TO CONTROL HER BLOOD PRESSURE BEFORE SURGERY WAS SENT TO [facility] AND SHOULD HAVE BEEN SENT TO THE [facility] [place] [id number] [place] [address] ·    PLEASE CHANGE THIS BEFORE [employer] SHIPS THE MEDICATION AND NOTIFY PATIENT WHEN IT IS DONE

## 2022-05-09 NOTE — TELEPHONE ENCOUNTER
Caller: Lidia Yeung    Relationship: Self    Best call back number: [phone]    What medication are you requesting: ANTIBIOTIC    What are your current symptoms: BURNING AND ITCHING IN VAGINA AREA, FREQUENT URINATION    How long have you been experiencing symptoms: FOR A FEW WEEKS    Have you had these symptoms before:    [x] Yes  [] No    Have you been treated for these symptoms before:   [x] Yes  [] No    If a prescription is needed, what is your preferred pharmacy and phone number: [id number] - [place], [place] - [address] [facility] AT [facility] [address] & [place] - [phone]  - [phone] FX     Additional notes:   PATIENT HAD SURGERY A FEW WEEKS AGO AND SHE BELIEVES THAT SHE WAS GIVEN AN ANTIBIOTIC IN HOSPITAL, WHICH SHE BELIEVES HAVE CAUSED HER TO HAVE A YEAST INFECTION NOW. SHE IS HAVING ITCHING AND BURNING IN THE VAGINA AREA WITH FREQUENT URINATION AND HAS BEEN SINCE SHE WAS RELEASED FROM HOSPITAL A FEW WEEKS AGO. PATIENT IS UNSURE OF RELEASE DATE. SHE STATED THAT SHE HAS HAD THIS PROBLEM IN THE PAST AND DR. CRESPO IS AWARE OF THIS. SHE IS REQUESTING MEDICATION FOR THIS AS SOON AS POSSIBLE AS SHE HAS BEEN ATTEMPTING TO HELP CURE IT AT HOME, BUT HAS NOT BEEN ABLE TO.

## 2022-07-15 NOTE — PROGRESS NOTES
Venipuncture Blood Specimen Collection  Venipuncture performed in left arm by Meli Saravia with good hemostasis. Patient tolerated the procedure well without complications.   07/15/22   Meli Saravia

## 2022-07-15 NOTE — PROGRESS NOTES
Chief Complaint  Hypertension (REFILLS)    Subjective          Lidia Yeung presents to [facility]  Hypertension  This is a chronic problem. The current episode started more than 1 year ago. The problem has been gradually improving since onset. The problem is controlled. Pertinent negatives include no anxiety, blurred vision, chest pain, headaches, malaise/fatigue, neck pain, orthopnea, palpitations, peripheral edema, PND, shortness of breath or sweats. There are no associated agents to hypertension. Risk factors for coronary artery disease include diabetes mellitus, dyslipidemia, family history and post-menopausal state. Current antihypertension treatment includes ACE inhibitors and diuretics. The current treatment provides significant improvement. There are no compliance problems.    Diabetes  She presents for her follow-up diabetic visit. She has type 2 diabetes mellitus. Her disease course has been stable. There are no hypoglycemic associated symptoms. Pertinent negatives for hypoglycemia include no headaches or sweats. Associated symptoms include foot paresthesias. Pertinent negatives for diabetes include no blurred vision, no chest pain, no fatigue, no foot ulcerations, no polydipsia, no polyphagia, no polyuria, no visual change, no weakness and no weight loss. There are no hypoglycemic complications. Symptoms are stable. Current diabetic treatment includes oral agent (monotherapy). She is compliant with treatment all of the time. She is following a generally healthy diet. An ACE inhibitor/angiotensin II receptor blocker is being taken.   Hyperlipidemia  This is a chronic problem. The current episode started more than 1 year ago. The problem is controlled. Recent lipid tests were reviewed and are variable. There are no known factors aggravating her hyperlipidemia. Pertinent negatives include no chest pain, focal sensory loss, focal weakness, leg pain, myalgias or shortness of  breath. Current antihyperlipidemic treatment includes statins. The current treatment provides significant improvement of lipids. There are no compliance problems.        Objective   Allergies   Allergen Reactions   • Atorvastatin    • Atorvastatin Calcium Swelling   • Ciprofloxacin Itching     FEVER,CHILLS   • Erythromycin    • Fentanyl Unknown - Low Severity   • Nsaids Unknown - Low Severity   • Penicillins Unknown - Low Severity   • Rosuvastatin    • Statins Unknown - Low Severity     Immunization History   Administered Date(s) Administered   • COVID-19 (MODERNA) 1st, 2nd, 3rd Dose Only 05/15/2021, 2021   • FluMist 2-49yrs 10/03/2014     Past Medical History:   Diagnosis Date   • Benign essential hypertension    • Chronic lower back pain    • Cluster headache    • Depression    • Difficulty walking    • Disc degeneration, lumbar    • Elevated fasting glucose    • Elevated transaminase level    • Fatigue    • Headache, tension-type    • Hypothyroidism    • Kidney stones    • Memory loss    • Migraine    • Seizures (HCC)       History reviewed. No pertinent surgical history.   Social History     Socioeconomic History   • Marital status:    Tobacco Use   • Smoking status: Former Smoker     Packs/day: 1.00     Years: 30.00     Pack years: 30.00     Types: Cigarettes     Quit date: 2016     Years since quittin.5   • Smokeless tobacco: Never Used   Substance and Sexual Activity   • Alcohol use: Yes     Comment: occasionally   • Drug use: Defer   • Sexual activity: Defer        Current Outpatient Medications:   •  albuterol sulfate  (90 Base) MCG/ACT inhaler, Inhale 2 puffs Every 4 (Four) Hours As Needed for Wheezing., Disp: 54 g, Rfl: 1  •  ALPRAZolam (XANAX) 1 MG tablet, Take by mouth., Disp: , Rfl:   •  amitriptyline (ELAVIL) 25 MG tablet, Take 1 tablet by mouth Every Night., Disp: 90 tablet, Rfl: 1  •  Biotin 69591 MCG tablet, Take  by mouth., Disp: , Rfl:   •  celecoxib (CeleBREX) 200 MG  capsule, Take 1 capsule by mouth Daily., Disp: 90 capsule, Rfl: 1  •  dapagliflozin Propanediol (Farxiga) 10 MG tablet, Take 10 mg by mouth Daily., Disp: 90 tablet, Rfl: 1  •  DULoxetine (CYMBALTA) 60 MG capsule, Take 1 capsule by mouth Daily., Disp: 90 capsule, Rfl: 1  •  fluticasone (FLONASE) 50 MCG/ACT nasal spray, 2 sprays by Each Nare route Daily., Disp: 48 g, Rfl: 1  •  furosemide (LASIX) 40 MG tablet, Take 1 tablet by mouth Daily., Disp: 90 tablet, Rfl: 1  •  guaiFENesin (Mucinex) 600 MG 12 hr tablet, Take 2 tablets by mouth 2 (Two) Times a Day., Disp: 180 tablet, Rfl: 1  •  hydroCHLOROthiazide (HYDRODIURIL) 25 MG tablet, Take 1 tablet by mouth Daily., Disp: 90 tablet, Rfl: 1  •  ibuprofen (ADVIL,MOTRIN) 800 MG tablet, Take 800 mg by mouth Every 6 (Six) Hours As Needed for Mild Pain ., Disp: , Rfl:   •  ipratropium-albuterol (DUO-NEB) 0.5-2.5 mg/mL nebulizer, Ipratropium-Albuterol 0.5-2.5 (3) MG/3ML Inhalation Solution; Patient Sig: Ipratropium-Albuterol 0.5-2.5 (3) MG/3ML Inhalation Solution ; 360; 0; 20-Oct-2015; Active, Disp: , Rfl:   •  lamoTRIgine (LaMICtal) 25 MG tablet, Take by mouth., Disp: , Rfl:   •  lansoprazole (PREVACID) 15 MG capsule, Take 1 capsule by mouth Daily., Disp: 90 capsule, Rfl: 1  •  levETIRAcetam (KEPPRA) 750 MG tablet, TAKE 1 TABLET TWICE DAILY, Disp: 180 tablet, Rfl: 0  •  levothyroxine (SYNTHROID, LEVOTHROID) 50 MCG tablet, Take 1 tablet by mouth Daily., Disp: 90 tablet, Rfl: 1  •  linaclotide (LINZESS) 290 MCG capsule capsule, Take 1 capsule by mouth Every Morning Before Breakfast., Disp: 90 capsule, Rfl: 1  •  lisinopril (PRINIVIL,ZESTRIL) 20 MG tablet, Take 1 tablet by mouth Daily., Disp: 90 tablet, Rfl: 1  •  lubiprostone (AMITIZA) 24 MCG capsule, Take 1 capsule by mouth 2 (Two) Times a Day With Meals., Disp: 90 capsule, Rfl: 1  •  Multiple Vitamins-Minerals (MULTIVITAMIN ADULT PO), Take  by mouth., Disp: , Rfl:   •  Omega 3 1000 MG capsule, Take  by mouth., Disp: , Rfl:   •   Pitavastatin Calcium 4 MG tablet, Take 1 tablet by mouth Daily., Disp: 90 tablet, Rfl: 1  •  polyethylene glycol (MIRALAX) powder, MIX 17 GRAMS (1 CAPFUL) IN 8 OUNCES OF LIQUID AND DRINK TWICE DAILY AS NEEDED., Disp: 1054 g, Rfl: 3  •  potassium chloride (K-DUR,KLOR-CON) 20 MEQ CR tablet, Take 1 tablet by mouth 2 (Two) Times a Day., Disp: 180 tablet, Rfl: 1  •  vitamin E 400 UNIT capsule, Take 400 Units by mouth Daily., Disp: , Rfl:   •  ketorolac (TORADOL) 10 MG tablet, Take 1 tablet by mouth Every 6 (Six) Hours As Needed for Moderate Pain ., Disp: 15 tablet, Rfl: 0  •  oxyCODONE HCl ER (oxyCONTIN) 60 MG 12 hr tablet, Take by mouth., Disp: , Rfl:   •  saccharomyces boulardii (FLORASTOR) 250 MG capsule, Take 1 capsule by mouth 2 (two) times a day., Disp: , Rfl:    Family History   Problem Relation Age of Onset   • Brain cancer Mother    • Arthritis Mother    • Bleeding Disorder Mother    • Diabetes Mother    • Hypertension Mother    • Other Mother         malignant neoplasm   • Migraines Mother    • Thyroid disease Mother    • Arthritis Father    • Hypertension Father    • Other Father         malignant neoplasm   • Migraines Father    • Thyroid disease Father    • Breast cancer Maternal Aunt    • Arthritis Maternal Grandmother    • Diabetes Maternal Grandmother           Vital Signs:   Vitals:    07/15/22 1336   BP: 136/88   Pulse: 107   Temp: 96.9 °F (36.1 °C)   SpO2: 98%   Weight: 91.2 kg (201 lb)       Review of Systems   Constitutional: Negative for fatigue, malaise/fatigue and weight loss.   Eyes: Negative for blurred vision.   Respiratory: Negative for shortness of breath.    Cardiovascular: Negative for chest pain, palpitations, orthopnea and PND.   Endocrine: Negative for polydipsia, polyphagia and polyuria.   Musculoskeletal: Negative for myalgias and neck pain.   Neurological: Negative for focal weakness, weakness and headaches.      Physical Exam  Vitals reviewed.   Constitutional:       Appearance:  Normal appearance. She is well-developed.   HENT:      Head: Normocephalic and atraumatic.      Right Ear: External ear normal.      Left Ear: External ear normal.      Mouth/Throat:      Pharynx: No oropharyngeal exudate.   Eyes:      Conjunctiva/sclera: Conjunctivae normal.      Pupils: Pupils are equal, round, and reactive to light.   Cardiovascular:      Rate and Rhythm: Normal rate and regular rhythm.      Pulses: Normal pulses.      Heart sounds: Normal heart sounds. No murmur heard.    No friction rub. No gallop.   Pulmonary:      Effort: Pulmonary effort is normal.      Breath sounds: Normal breath sounds. No wheezing or rhonchi.   Abdominal:      General: Abdomen is flat. Bowel sounds are normal. There is no distension.      Palpations: Abdomen is soft. There is no mass.      Tenderness: There is no abdominal tenderness. There is no guarding or rebound.      Hernia: No hernia is present.   Musculoskeletal:         General: Normal range of motion.      Cervical back: Normal range of motion and neck supple.   Skin:     General: Skin is warm and dry.      Capillary Refill: Capillary refill takes less than 2 seconds.   Neurological:      General: No focal deficit present.      Mental Status: She is alert and oriented to person, place, and time.      Cranial Nerves: No cranial nerve deficit.   Psychiatric:         Mood and Affect: Mood and affect normal.         Behavior: Behavior normal.         Thought Content: Thought content normal.         Judgment: Judgment normal.        Result Review :   The following data was reviewed by: Rafael Mi MD on 07/15/2022:  CMP    CMP 10/12/21 4/14/22   Glucose 91    BUN 12    Creatinine 0.94    eGFR Non African Am 61    Sodium 141    Potassium 4.0 3.3 (A)   Chloride 100    Calcium 10.1    Albumin 5.10    Total Bilirubin 0.4    Alkaline Phosphatase 92    AST (SGOT) 21    ALT (SGPT) 19    (A) Abnormal value            CBC    CBC 1/11/22 4/15/22 4/16/22   WBC 7.92     RBC  4.46     Hemoglobin 14.2 10.6 (A) 9.8 (A)   Hematocrit 42.1 32.2 (A) 30.9 (A)   MCV 94.4     MCH 31.8     MCHC 33.7     RDW 12.6     Platelets 234     (A) Abnormal value            Lipid Panel    Lipid Panel 10/12/21   Total Cholesterol 298 (A)   Triglycerides 292 (A)   HDL Cholesterol 48   VLDL Cholesterol 57 (A)   LDL Cholesterol  193 (A)   LDL/HDL Ratio 3.99   (A) Abnormal value            TSH    TSH 10/12/21   TSH 1.650           Most Recent A1C    HGBA1C Most Recent 10/12/21   Hemoglobin A1C 5.89 (A)   (A) Abnormal value            Microalbumin    Microalbumin 10/12/21   Microalbumin, Urine <1.2                     Assessment and Plan    Diagnoses and all orders for this visit:    1. Primary hypertension (Primary)  -     TSH+Free T4  -     furosemide (LASIX) 40 MG tablet; Take 1 tablet by mouth Daily.  Dispense: 90 tablet; Refill: 1  -     hydroCHLOROthiazide (HYDRODIURIL) 25 MG tablet; Take 1 tablet by mouth Daily.  Dispense: 90 tablet; Refill: 1  -     lisinopril (PRINIVIL,ZESTRIL) 20 MG tablet; Take 1 tablet by mouth Daily.  Dispense: 90 tablet; Refill: 1  -     potassium chloride (K-DUR,KLOR-CON) 20 MEQ CR tablet; Take 1 tablet by mouth 2 (Two) Times a Day.  Dispense: 180 tablet; Refill: 1    2. Type 2 diabetes mellitus with hyperglycemia, without long-term current use of insulin ([facility])  -     CBC Auto Differential  -     Comprehensive Metabolic Panel  -     Hemoglobin A1c  -     Lipid Panel  -     MicroAlbumin, Urine, Random - Urine, Clean Catch  -     dapagliflozin Propanediol (Farxiga) 10 MG tablet; Take 10 mg by mouth Daily.  Dispense: 90 tablet; Refill: 1    3. Mixed hyperlipidemia  -     Pitavastatin Calcium 4 MG tablet; Take 1 tablet by mouth Daily.  Dispense: 90 tablet; Refill: 1    4. Lung nodule  -     CT Chest With Contrast; Future    5. Class 1 obesity with serious comorbidity and body mass index (BMI) of 34.0 to 34.9 in adult, unspecified obesity type  -     Ambulatory Referral to Bariatric  Surgery    6. Chronic bilateral thoracic back pain  -     Ambulatory Referral to Plastic Surgery    7. BRCA gene mutation test positive  -     Ambulatory Referral to Plastic Surgery    8. FH: breast cancer  -     Ambulatory Referral to Plastic Surgery    9. Arthritis  -     celecoxib (CeleBREX) 200 MG capsule; Take 1 capsule by mouth Daily.  Dispense: 90 capsule; Refill: 1    10. Non-seasonal allergic rhinitis, unspecified trigger  -     albuterol sulfate  (90 Base) MCG/ACT inhaler; Inhale 2 puffs Every 4 (Four) Hours As Needed for Wheezing.  Dispense: 54 g; Refill: 1  -     fluticasone (FLONASE) 50 MCG/ACT nasal spray; 2 sprays by Each Nare route Daily.  Dispense: 48 g; Refill: 1  -     guaiFENesin (Mucinex) 600 MG 12 hr tablet; Take 2 tablets by mouth 2 (Two) Times a Day.  Dispense: 180 tablet; Refill: 1    11. Chronic back pain, unspecified back location, unspecified back pain laterality  -     Discontinue: amitriptyline (ELAVIL) 10 MG tablet; Take 1 tablet by mouth Every Night.  Dispense: 90 tablet; Refill: 1  -     amitriptyline (ELAVIL) 25 MG tablet; Take 1 tablet by mouth Every Night.  Dispense: 90 tablet; Refill: 1    12. Gastroesophageal reflux disease without esophagitis  -     lansoprazole (PREVACID) 15 MG capsule; Take 1 capsule by mouth Daily.  Dispense: 90 capsule; Refill: 1    13. Hypothyroidism, unspecified type  -     levothyroxine (SYNTHROID, LEVOTHROID) 50 MCG tablet; Take 1 tablet by mouth Daily.  Dispense: 90 tablet; Refill: 1    14. Irritable bowel syndrome, unspecified type  -     linaclotide (LINZESS) 290 MCG capsule capsule; Take 1 capsule by mouth Every Morning Before Breakfast.  Dispense: 90 capsule; Refill: 1  -     lubiprostone (AMITIZA) 24 MCG capsule; Take 1 capsule by mouth 2 (Two) Times a Day With Meals.  Dispense: 90 capsule; Refill: 1    15. Anxiety and depression  -     DULoxetine (CYMBALTA) 60 MG capsule; Take 1 capsule by mouth Daily.  Dispense: 90 capsule; Refill:  1    16. Urinary incontinence, unspecified type  -     Ambulatory Referral to Urology    17. Skin cancer screening  -     Ambulatory Referral to Dermatology            Follow Up   Return today (on 7/15/2022), or if symptoms worsen or fail to improve.  Patient was given instructions and counseling regarding her condition or for health maintenance advice. Please see specific information pulled into the AVS if appropriate.

## 2022-07-18 NOTE — PROGRESS NOTES
Labs show slightly low potassium- continue your current potassium.  Cholesterol is looking a little better but still elevated.  Really watch diet and exercise.  Diabetes is beginning to worsen.  Watch diet.  Follow-up if you have any questions.

## 2022-08-02 PROBLEM — K31.9 STOMACH DISORDER: Status: ACTIVE | Noted: 2022-01-01

## 2022-08-02 PROBLEM — E11.9 DIABETES (HCC): Status: ACTIVE | Noted: 2022-01-01

## 2022-08-02 PROBLEM — Z80.3 FAMILY HISTORY OF BREAST CANCER: Status: ACTIVE | Noted: 2021-12-17

## 2022-08-02 PROBLEM — K21.9 ESOPHAGEAL REFLUX: Status: ACTIVE | Noted: 2022-01-01

## 2022-08-02 PROBLEM — Z15.09 BRCA1 GENE MUTATION POSITIVE: Status: ACTIVE | Noted: 2022-01-18

## 2022-08-02 PROBLEM — R20.0 NUMBNESS AND TINGLING SENSATION OF SKIN: Status: ACTIVE | Noted: 2022-01-01

## 2022-08-02 PROBLEM — R23.8 ABNORMAL FOOT COLOR: Status: ACTIVE | Noted: 2022-01-01

## 2022-08-02 PROBLEM — K63.9 BOWEL DISEASE: Status: ACTIVE | Noted: 2022-01-01

## 2022-08-02 PROBLEM — J34.89 NASAL OBSTRUCTION: Status: ACTIVE | Noted: 2022-01-01

## 2022-08-02 PROBLEM — E03.9 HYPOTHYROID: Status: ACTIVE | Noted: 2022-01-01

## 2022-08-02 PROBLEM — D68.9 COAGULATION DISORDER (HCC): Status: ACTIVE | Noted: 2022-01-01

## 2022-08-02 PROBLEM — N94.9 DISCOMFORT OF VAGINA: Status: ACTIVE | Noted: 2022-01-01

## 2022-08-02 PROBLEM — G98.8 NEUROLOGIC DISORDER: Status: ACTIVE | Noted: 2022-01-01

## 2022-08-02 PROBLEM — G56.00 CARPAL TUNNEL SYNDROME: Status: ACTIVE | Noted: 2022-01-01

## 2022-08-02 PROBLEM — M35.00 SJOGREN'S SYNDROME (HCC): Status: ACTIVE | Noted: 2022-01-01

## 2022-08-02 PROBLEM — F41.9 ANXIETY: Status: ACTIVE | Noted: 2022-01-01

## 2022-08-02 PROBLEM — K62.5 RECTAL BLEEDING: Status: ACTIVE | Noted: 2022-01-01

## 2022-08-02 PROBLEM — M51.36 DEGENERATION OF INTERVERTEBRAL DISC OF LUMBAR REGION: Status: ACTIVE | Noted: 2022-01-01

## 2022-08-02 PROBLEM — M21.619 BUNION: Status: ACTIVE | Noted: 2022-01-01

## 2022-08-02 PROBLEM — M79.673 HEEL PAIN: Status: ACTIVE | Noted: 2018-05-08

## 2022-08-02 PROBLEM — M54.2 NECK PAIN: Status: ACTIVE | Noted: 2021-06-17

## 2022-08-02 PROBLEM — J45.909 ASTHMA: Status: ACTIVE | Noted: 2022-01-01

## 2022-08-02 PROBLEM — R20.2 NUMBNESS AND TINGLING SENSATION OF SKIN: Status: ACTIVE | Noted: 2022-01-01

## 2022-08-02 PROBLEM — S32.009K PSEUDOARTHROSIS OF LUMBAR SPINE: Status: ACTIVE | Noted: 2021-06-17

## 2022-08-02 PROBLEM — B37.31 CANDIDIASIS OF VAGINA: Status: ACTIVE | Noted: 2022-01-01

## 2022-08-02 PROBLEM — H04.123 DRY EYES: Status: ACTIVE | Noted: 2022-01-01

## 2022-08-02 PROBLEM — M79.89 LIMB SWELLING: Status: ACTIVE | Noted: 2022-01-01

## 2022-08-02 PROBLEM — R20.0 NUMBNESS IN FEET: Status: ACTIVE | Noted: 2022-01-01

## 2022-08-02 PROBLEM — G40.909 SEIZURE DISORDER (HCC): Status: ACTIVE | Noted: 2022-01-01

## 2022-08-02 PROBLEM — E78.5 HYPERLIPEMIA: Status: ACTIVE | Noted: 2022-01-01

## 2022-08-02 PROBLEM — G43.909 MIGRAINES: Status: ACTIVE | Noted: 2022-01-01

## 2022-08-02 PROBLEM — L25.9 CONTACT DERMATITIS: Status: ACTIVE | Noted: 2022-01-01

## 2022-08-02 PROBLEM — J30.2 SEASONAL ALLERGIC RHINITIS: Status: ACTIVE | Noted: 2022-01-01

## 2022-08-02 PROBLEM — M54.9 BACK PAIN: Status: ACTIVE | Noted: 2022-01-01

## 2022-08-02 PROBLEM — E07.9 THYROID DISORDER: Status: ACTIVE | Noted: 2022-01-01

## 2022-08-02 PROBLEM — Z15.01 BRCA1 GENE MUTATION POSITIVE: Status: ACTIVE | Noted: 2022-01-18

## 2022-08-02 PROBLEM — R06.02 SHORTNESS OF BREATH: Status: ACTIVE | Noted: 2022-01-01

## 2022-08-02 PROBLEM — N62 MACROMASTIA: Status: ACTIVE | Noted: 2017-03-27

## 2022-08-02 PROBLEM — K58.9 IRRITABLE BOWEL SYNDROME: Status: ACTIVE | Noted: 2022-01-01

## 2022-08-02 PROBLEM — N20.0 KIDNEY STONES: Status: ACTIVE | Noted: 2022-01-01

## 2022-08-02 PROBLEM — M48.061 SPINAL STENOSIS OF LUMBAR REGION WITHOUT NEUROGENIC CLAUDICATION: Status: ACTIVE | Noted: 2022-01-07

## 2022-08-02 PROBLEM — I50.9 CONGESTIVE HEART FAILURE (HCC): Status: ACTIVE | Noted: 2022-01-01

## 2022-08-02 PROBLEM — L84 CORNS AND CALLOSITY: Status: ACTIVE | Noted: 2022-01-01

## 2022-08-02 PROBLEM — I10 BENIGN ESSENTIAL HYPERTENSION: Status: ACTIVE | Noted: 2022-01-01

## 2022-08-02 PROBLEM — N32.9 BLADDER DISORDER: Status: ACTIVE | Noted: 2022-01-01

## 2022-08-02 NOTE — PROGRESS NOTES
"Consult (Breast reduction- BRCA positive)            History of Present Illness  Lidia Yeung is a 56 y.o. female who presents to [facility] as a consult from Dr. Rafael Mi MD to discuss breast reconstructive options.  She wears 40DD bra size and would like to be full B. She is wanting mastectomy first due to BRCA 1 positive on genetics. Her sister has had breast cancer, bilateral mastectomy and reconstruction but had to have radiation as well.       Patient does have a personal or family history of breast cancer.  Neck, shoulders, and upper back pain present for 20+ years.  Conservative treatments of neck injections with little or temporary relief.  Due to back pain would like reconstruction to be B cup or on the smaller side. Does not want breast reduction. Feels a lump on outer quadrant of right breast but had negative mammo and US. Saw Dr. Rice.     . Does vape- no nicotine. Last seizure was over a year ago. Takes keppra that PCP (Dr. Rafael Mi) manages. Last mammogram 11/2021. Benign.         Subjective      Atorvastatin, Atorvastatin calcium, Ciprofloxacin, Erythromycin, Fentanyl, Nsaids, Penicillins, Rosuvastatin, and Statins  Allergies Reconciled.    Review of Systems  All system were reviewed and were negative, except the ones noted above.     @Objective     /85   Pulse 97   Temp 99.1 °F (37.3 °C) (Temporal)   Ht 162.6 cm (64.02\")   Wt 92.3 kg (203 lb 6.4 oz)   SpO2 97%   BMI 34.90 kg/m²     Body mass index is 34.9 kg/m².    Physical Exam    Patient awake, alert, oriented, speech slow at times (had history of head injury)  Respirations are non elaborated  Patient is not tachycardic    Breast exam:     Bilateral breast with right breast larger than left, grade 3 nipple ptosis, no palpable masses or tenderness   Axillary Lymphadenopathy: No axillary lymphadenopathy  SN-N (Right Breast): 33  SN-N (Left Breast): 31  N-IMF (Right Breast): " 12  N-IMF (Left Breast): 11  Base Width (Right Breast): 12  Base Width (Left Breast): 12          Result Review :              Assessment and Plan      Diagnoses and all orders for this visit:    1. BRCA gene mutation positive (Primary)    2. Breast pain        Additional Order(s):       Plan:  • Breast reconstruction options (Tissue Expander/Implant versus Autologous) were all discussed with the patient at length.  In addition, we discussed options for symmetry procedures and nipple reconstruction.  The patient understands that her reconstructed breast will not have the same sensation as a natural breast and that visible incision lines will always be present.  In addition, patient understand that breast reconstruction is a multi-stage procedure that can take months to years to complete.   • We had a long discussion today regarding her reconstructive options.  These include no reconstruction, reconstruction at the time of mastectomy or lumpectomy, and finally delayed reconstruction.  We discussed specific types of reconstruction, including: tissue expander and/or implants, and autologous reconstruction with either pedicled or free flap.  We also covered the later stages of reconstruction, including nipple reconstruction and areola tattooing, fat grafting, and symmetry procedures if indicated or desired.   • I described the typical arnol-operative course of each procedure, including the nature of the procedure, hospital stay, necessity for drains, post-operative activity restriction, and postoperative visits (including those for tissue expansion).  We also discussed the time frame for reconstruction.  I shared information about saline vs. silicone implants, including their differences, risk of capsular contracture, rippling, or leak/rupture, and safety/monitoring issues.  I described Alloderm and its use for implant reconstruction. We discussed that radiation therapy, if she ultimately requires it, may alter the  reconstructive options.     • We reviewed surgical risks including, but not limited to, infection, bleeding, hematoma, seroma, pain, scarring, numbness, skin or nipple loss, wound healing problems, flap failures including partial or complete flap loss, asymmetry, need for revisions or further surgeries,  and risks associated with anesthesia.   • Additional risks with autologous reconstruction include donor wound morbidities, such as hernias or bulges, wound healing problems, muscle weakness, partial or complete flap loss, and typical surgical risks as listed above.   • The use of biological mesh is not for soft tissue reinforcement. The use of mesh is necessary because the mastectomy dissection pocket is larger than the implant itself. The intention of the mesh is to restrain the displacement of the implant to the axilla, which is a very common complication requiring revision. In my experience, the use of mesh avoids that specific complication and very often avoids a second surgery. The use of mesh also allows me to perform a safer procedure since it holds the implant in place and alleviates the pressure over the skin that depends only on the subdermal blood supply. Without the mesh, I believe I wouldn't be able to perform direct implant reconstruction and give the optimal result that patient desires and deserves.       •  Specifically on her case,  She needs to see general surgery to discuss prophylactic mastectomy. She states she feels a mass over right breast so may need further imaging.  I will refer to Dr. Mills. RTC after this visit to discuss further reconstructive options. Patient notes she does not want pain medication after surgery due to past addiction. We will discuss further.          Follow Up     No follow-ups on file.    Patient was given instructions and counseling regarding her condition. Please see specific information pulled into the AVS if appropriate.     Ana Cristina Shah,  APRN  08/02/2022

## 2022-08-03 NOTE — PROGRESS NOTES
A [email or web address] message has been sent to the patient for PATIENT ROUNDING with [facility].

## 2022-08-17 NOTE — PROGRESS NOTES
Chief Complaint: Abnormal Breast Imaging    Subjective         History of Present Illness  Lidia Yeung is a 56 y.o. female presents to [facility] to be seen for BRCA positive gene mutation. She was seen by plastics and wants mastectomy with reconstruction. Her last mammogram was in 2021.  Needs more recent imaging.     Narrative & Impression   PROCEDURE:  MAMMO DIAGNOSTIC DIGITAL TOMOSYNTHESIS BILATERAL W CAD     COMPARISON:  [facility], , DIGITAL SCREENING W/CAD, 10/25/2018, 15:06.  [facility], , DIGITAL SCREENING W/CAD, 12/11/2015, 8:17.  [facility], MG, MAMMO SCREENING BILATERAL W/CAD, 4/19/2011, 16:09.  [facility],   US, US BREAST RIGHT LIMITED, 11/22/2021, 9:44.  Other, , MAMMO SCREENING DIGITAL TOMOSYNTHESIS   BILATERAL WO CAD (INACTIVE, 5/28/2020, 15:13.  INDICATIONS:  right breast mass 8 o'clock     FINDINGS:          The area of palpable concern in the right breast was marked, and included on routine views and   supplemental spot compression views.     No suspicious mass, architectural distortion, or suspicious microcalcifications are seen in either   breast.  Targeted right breast ultrasound reveals heterogeneously echoic breast tissue.  No solid   relatively hypo or hyperechoic mass is evident.     IMPRESSION:  Benign bilateral diagnostic mammogram and targeted right breast ultrasound.         Objective     Past Medical History:   Diagnosis Date   • Benign essential hypertension    • Chronic lower back pain    • Cluster headache    • Depression    • Difficulty walking    • Disc degeneration, lumbar    • Elevated fasting glucose    • Elevated transaminase level    • Fatigue    • Headache, tension-type    • Hypothyroidism    • Kidney stones    • Memory loss    • Migraine    • Seizures (HCC)        No past surgical history on file.      Current Outpatient Medications:   •  albuterol  sulfate  (90 Base) MCG/ACT inhaler, Inhale 2 puffs Every 4 (Four) Hours As Needed for Wheezing., Disp: 54 g, Rfl: 1  •  ALPRAZolam (XANAX) 2 MG tablet, Take 2 mg by mouth 3 (Three) Times a Day., Disp: , Rfl:   •  amitriptyline (ELAVIL) 25 MG tablet, Take 1 tablet by mouth Every Night., Disp: 90 tablet, Rfl: 1  •  Biotin 37406 MCG tablet, Take  by mouth., Disp: , Rfl:   •  celecoxib (CeleBREX) 200 MG capsule, Take 1 capsule by mouth Daily., Disp: 90 capsule, Rfl: 1  •  dapagliflozin Propanediol (Farxiga) 10 MG tablet, Take 10 mg by mouth Daily., Disp: 90 tablet, Rfl: 1  •  DULoxetine (CYMBALTA) 60 MG capsule, Take 1 capsule by mouth Daily., Disp: 90 capsule, Rfl: 1  •  fluticasone (FLONASE) 50 MCG/ACT nasal spray, 2 sprays by Each Nare route Daily., Disp: 48 g, Rfl: 1  •  furosemide (LASIX) 40 MG tablet, Take 1 tablet by mouth Daily., Disp: 90 tablet, Rfl: 1  •  guaiFENesin (Mucinex) 600 MG 12 hr tablet, Take 2 tablets by mouth 2 (Two) Times a Day., Disp: 180 tablet, Rfl: 1  •  hydroCHLOROthiazide (HYDRODIURIL) 25 MG tablet, Take 1 tablet by mouth Daily., Disp: 90 tablet, Rfl: 1  •  ibuprofen (ADVIL,MOTRIN) 800 MG tablet, Take 800 mg by mouth Every 6 (Six) Hours As Needed for Mild Pain ., Disp: , Rfl:   •  ipratropium-albuterol (DUO-NEB) 0.5-2.5 mg/mL nebulizer, Ipratropium-Albuterol 0.5-2.5 (3) MG/3ML Inhalation Solution; Patient Sig: Ipratropium-Albuterol 0.5-2.5 (3) MG/3ML Inhalation Solution ; 360; 0; 20-Oct-2015; Active, Disp: , Rfl:   •  lamoTRIgine (LaMICtal) 25 MG tablet, Take by mouth., Disp: , Rfl:   •  lansoprazole (PREVACID) 15 MG capsule, Take 1 capsule by mouth Daily., Disp: 90 capsule, Rfl: 1  •  levETIRAcetam (KEPPRA) 750 MG tablet, TAKE 1 TABLET TWICE DAILY, Disp: 180 tablet, Rfl: 0  •  levothyroxine (SYNTHROID, LEVOTHROID) 50 MCG tablet, Take 1 tablet by mouth Daily., Disp: 90 tablet, Rfl: 1  •  linaclotide (LINZESS) 290 MCG capsule capsule, Take 1 capsule by mouth Every Morning Before  Breakfast., Disp: 90 capsule, Rfl: 1  •  lisinopril (PRINIVIL,ZESTRIL) 20 MG tablet, Take 1 tablet by mouth Daily., Disp: 90 tablet, Rfl: 1  •  lubiprostone (AMITIZA) 24 MCG capsule, Take 24 mcg by mouth., Disp: , Rfl:   •  Multiple Vitamins-Minerals (MULTIVITAMIN ADULT PO), Take  by mouth., Disp: , Rfl:   •  Omega 3 1000 MG capsule, Take  by mouth., Disp: , Rfl:   •  Pitavastatin Calcium 4 MG tablet, Take 1 tablet by mouth Daily., Disp: 90 tablet, Rfl: 1  •  polyethylene glycol (MIRALAX) powder, MIX 17 GRAMS (1 CAPFUL) IN 8 OUNCES OF LIQUID AND DRINK TWICE DAILY AS NEEDED., Disp: 1054 g, Rfl: 3  •  potassium chloride (K-DUR,KLOR-CON) 20 MEQ CR tablet, Take 1 tablet by mouth 2 (Two) Times a Day., Disp: 180 tablet, Rfl: 1  •  vitamin E 400 UNIT capsule, Take 400 Units by mouth Daily., Disp: , Rfl:     Allergies   Allergen Reactions   • Atorvastatin    • Atorvastatin Calcium Swelling   • Ciprofloxacin Itching     FEVER,CHILLS   • Erythromycin    • Fentanyl Unknown - Low Severity   • Nsaids Unknown - Low Severity   • Penicillins Unknown - Low Severity   • Rosuvastatin    • Statins Unknown - Low Severity        Family History   Problem Relation Age of Onset   • Brain cancer Mother    • Arthritis Mother    • Bleeding Disorder Mother    • Diabetes Mother    • Hypertension Mother    • Other Mother         malignant neoplasm   • Migraines Mother    • Thyroid disease Mother    • Arthritis Father    • Hypertension Father    • Other Father         malignant neoplasm   • Migraines Father    • Thyroid disease Father    • Breast cancer Maternal Aunt    • Arthritis Maternal Grandmother    • Diabetes Maternal Grandmother        Social History     Socioeconomic History   • Marital status:    Tobacco Use   • Smoking status: Former Smoker     Packs/day: 1.00     Years: 30.00     Pack years: 30.00     Types: Cigarettes     Quit date: 2016     Years since quittin.6   • Smokeless tobacco: Never Used   Vaping Use   • Vaping  "Use: Some days   • Substances: Flavoring   • Devices: Disposable, Pre-filled or refillable cartridge, Refillable tank, Pre-filled pod   • Passive vaping exposure: Yes   Substance and Sexual Activity   • Alcohol use: Yes     Comment: occasionally   • Drug use: Defer   • Sexual activity: Defer       Vital Signs:   Resp 15   Ht 162.6 cm (64.02\")   Wt 92.1 kg (203 lb)   BMI 34.82 kg/m²    Review of Systems    Physical Exam  Vitals and nursing note reviewed.   Constitutional:       Appearance: Normal appearance.   HENT:      Head: Normocephalic and atraumatic.   Eyes:      Extraocular Movements: Extraocular movements intact.      Pupils: Pupils are equal, round, and reactive to light.   Cardiovascular:      Pulses: Normal pulses.   Pulmonary:      Effort: Pulmonary effort is normal. No accessory muscle usage or respiratory distress.   Chest:   Breasts:      Right: Normal. No inverted nipple, nipple discharge, skin change, axillary adenopathy or supraclavicular adenopathy.      Left: Normal. No inverted nipple, nipple discharge, skin change, axillary adenopathy or supraclavicular adenopathy.        Comments: She has some increased density in right outer breast and this feels asymmetric to same area on the left  Abdominal:      General: Abdomen is flat.      Palpations: Abdomen is soft.      Tenderness: There is no abdominal tenderness. There is no guarding.   Musculoskeletal:         General: No swelling, tenderness or deformity.      Cervical back: Neck supple.   Lymphadenopathy:      Upper Body:      Right upper body: No supraclavicular or axillary adenopathy.      Left upper body: No supraclavicular or axillary adenopathy.   Skin:     General: Skin is warm and dry.   Neurological:      General: No focal deficit present.      Mental Status: She is alert and oriented to person, place, and time.   Psychiatric:         Mood and Affect: Mood normal.         Thought Content: Thought content normal.          Result Review " :               Assessment and Plan    Diagnoses and all orders for this visit:    1. BRCA gene mutation positive (Primary)  -     Mammo Screening Bilateral With CAD; Future    Will plan for bilateral mastectomy with plastics    Follow Up   No follow-ups on file.  Patient was given instructions and counseling regarding her condition or for health maintenance advice. Please see specific information pulled into the AVS if appropriate.         This document has been electronically signed by Kaya Mills MD  August 17, 2022 12:16 EDT

## 2022-08-24 NOTE — PROGRESS NOTES
"Follow-up (PRE OP COMBO CASE)            History of Present Illness  Lidia Yeung is a 56 y.o. female who presents to [facility] as a consult from Dr Mills. Combo case with bilateral mastectomy scheduled on 9/13/22. She would like to be a B.    Patient vapes and has 2nd hand smoke exposure from  and has a pain stimulator. Wants Norco post op.         Subjective      Atorvastatin, Atorvastatin calcium, Ciprofloxacin, Erythromycin, Fentanyl, Nsaids, Penicillins, Rosuvastatin, and Statins  Allergies Reconciled.    Review of Systems  All system were reviewed and were negative, except the ones noted above.     @Objective     BP (!) 162/102   Pulse 105   Temp 98.4 °F (36.9 °C) (Temporal)   Ht 162.6 cm (64.02\")   Wt 91.4 kg (201 lb 9.6 oz)   SpO2 96%   BMI 34.59 kg/m²     Body mass index is 34.59 kg/m².        Result Review :         PROCEDURE:  RECON BREAST RECON COMBO CASE WITH IMMEDIATE RECONSTRUCTION WITH PLACEMENT OF IMPLANTS 2HR.    SIENTRA.     Assessment and Plan      Diagnoses and all orders for this visit:    1. Pre-operative examination (Primary)  -     Nicotine Screen, Urine - Urine, Clean Catch; Future        Additional Order(s):       Plan:    • Breast reconstruction options (Tissue Expander/Implant versus Autologous) were all discussed with the patient at length.  In addition, we discussed options for symmetry procedures and nipple reconstruction.  The patient understands that her reconstructed breast will not have the same sensation as a natural breast and that visible incision lines will always be present.  In addition, patient understand that breast reconstruction is a multi-stage procedure that can take months to years to complete.   • After thorough discussion of risk and benefits of each procedure the patient has elected to proceed.   • We will send information for prior authorization.  Photos were obtained.   • Patient was given the " breast reconstruction pamphlet as well as directed to the ASPS website for additional information.   • The patient was made aware that the FDA has discovered rare occurrences between an uncommon form of lymphoma (anaplastic large cell) and women with breast implants.  While the incidence is quite low, the risk of development is real; therefore, any unusual symptoms or signs (most commonly a late fluid collection years later) should be brought to the attention of your physician.  Patient also counseled on risks and benefits of saline versus silicone implants, lifting restrictions, scar placement, risk of capsular contracture, animation deformity, need for likely revision of breast implants at some point in her life, FDA recommendation of MRI every three years to monitor for rupture, and continued mammography for breast cancer surveillance.     • We had a long discussion with the patient and her family today regarding her reconstructive options.  These include no reconstruction, reconstruction at the time of mastectomy or lumpectomy, and finally delayed reconstruction.  We discussed specific types of reconstruction, including: tissue expander and/or implants, and autologous reconstruction with either pedicled or free flap.  We also covered the later stages of reconstruction, including nipple reconstruction and areola tattooing, fat grafting, and symmetry procedures if indicated or desired.   • I described the typical arnol-operative course of each procedure, including the nature of the procedure, hospital stay, necessity for drains, post-operative activity restriction, and postoperative visits (including those for tissue expansion).  We also discussed the time frame for reconstruction.  I shared information about saline vs. silicone implants, including their differences, risk of capsular contracture, rippling, or leak/rupture, and safety/monitoring issues.  I described Alloderm and its use for implant reconstruction.  We discussed that radiation therapy, if she ultimately requires it, may alter the reconstructive options.     • We reviewed surgical risks including, but not limited to, infection, bleeding, hematoma, seroma, pain, scarring, numbness, skin or nipple loss, wound healing problems, flap failures including partial or complete flap loss, asymmetry, need for revisions or further surgeries,  and risks associated with anesthesia.   • Additional risks with autologous reconstruction include donor wound morbidities, such as hernias or bulges, wound healing problems, muscle weakness, partial or complete flap loss, and typical surgical risks as listed above.   • The use of biological mesh is not for soft tissue reinforcement. The use of mesh is necessary because the mastectomy dissection pocket is larger than the implant itself. The intention of the mesh is to restrain the displacement of the implant to the axilla, which is a very common complication requiring revision. In my experience, the use of mesh avoids that specific complication and very often avoids a second surgery. The use of mesh also allows me to perform a safer procedure since it holds the implant in place and alleviates the pressure over the skin that depends only on the subdermal blood supply. Without the mesh, I believe I wouldn't be able to perform direct implant reconstruction and give the optimal result that patient desires and deserves.   •  Specifically on her case,  She wants immediate bilateral breast reconstruction.  • Consent: Bilateral breast reconstruction with placement of implants and mesh, use of spy        • CPT codes:   • 17801-88 - immediate insertion of breast prosthesis following reconstruction   • 89141-38  implantation of biologic implant  • 19349- intravenous injection of agent (SPY)    •   Implants Sientra:  • Sientra Smooth round gel implant  HP   • Expander: [id number] x2  • Single use sizer [id number] x1      Mesh:  • Mesh: cortiva   rectangle [id number] 16x20 cm x2    OR time: 3 hours     Scribed by Jana Mi MA, acting as a scribe for HODAN Fuentes.  HODAN Fuentes's signature on the note affirms that the note adequately documents the care provided.              Follow Up         Patient was given instructions and counseling regarding her condition. Please see specific information pulled into the AVS if appropriate.     Jana Mi MA  08/24/2022

## 2022-09-13 NOTE — ANESTHESIA PREPROCEDURE EVALUATION
Anesthesia Evaluation     Patient summary reviewed and Nursing notes reviewed   no history of anesthetic complications:  NPO Solid Status: > 8 hours  NPO Liquid Status: > 2 hours           Airway   Mallampati: II  TM distance: >3 FB  Neck ROM: full  No difficulty expected  Dental    (+) upper dentures and lower dentures    Pulmonary - normal exam    breath sounds clear to auscultation  (+) COPD, asthma,  Cardiovascular - normal exam  Exercise tolerance: good (4-7 METS)    Rhythm: regular  Rate: normal    (+) CHF ,       Neuro/Psych  (+) seizures well controlled,    GI/Hepatic/Renal/Endo    (+)  GERD, PUD, GI bleeding , diabetes mellitus, thyroid problem     Musculoskeletal     (+) back pain, neck pain,   Abdominal    Substance History - negative use     OB/GYN negative ob/gyn ROS         Other - negative ROS       ROS/Med Hx Other: >4METS, HX HTN, SEIZURES (SATYA MAL), LAST 2020/MED CONTROLLED. HX LUMBAR/SACRAL FUSION A/P 3/14/22. ADMITS INTEROP AWARENESS WITH MAC. ECHO 4/22/19 EF 60%, NO VALVE STENOSIS. KT                   Anesthesia Plan    ASA 3     general       Anesthetic plan, risks, benefits, and alternatives have been provided, discussed and informed consent has been obtained with: patient and spouse/significant other.        CODE STATUS:

## 2022-09-14 NOTE — PROGRESS NOTES
"Post-op Follow-up (1 week post op 9/13/22)            History of Present Illness  Lidia Yeung is a 56 y.o. female who presents to [facility] as a post operative follow up for Bilateral breast reconstruction with placement of implants and mesh, use of spy on 09/13/22.     She is 1wk post op, Bilateral drains intact. Patient is not sure of what her drains have been. She wasn't told to keep track. Pt has no concerns. Is not wearing any compression.    Subjective      Atorvastatin, Atorvastatin calcium, Ciprofloxacin, Erythromycin, Fentanyl, Rosuvastatin, Statins, and Penicillins  Allergies Reconciled.    Review of Systems   All review of system has been reviewed and it  is negative except the ones note above.     Objective     /87 (BP Location: Left arm, Patient Position: Sitting)   Pulse 93   Ht 167.6 cm (66\")   Wt 92.5 kg (204 lb)   SpO2 94%   BMI 32.93 kg/m²     Body mass index is 32.93 kg/m².    Physical Exam   Cardiovascular: Normal rate.     Pulmonary/Chest  Effort normal.      Breast  Glue in place over mastectomy incisions with no erythema, expected ecchymosis and edema, bilateral drains in place with serosanguineous fluid, implants in good position.                                                    Result Review :              Assessment and Plan      Diagnoses and all orders for this visit:    1. Postoperative follow-up (Primary)        Additional Order(s):       Plan:  • Bilateral breast drains intact. Changed drain dressing sites. Patient was not aware to keep track of drain output. Start wearing compression bra. RTC Monday for possible drain removal. Patient is aware to call Friday if her drain output has been less than 30CC's for 3 days. Keep arms in and limit activity.     Scribed by Jana Mi MA, acting as a scribe for HODAN Fuentes, 09/20/22 10:41 EDT.  HODAN Fuentes's signature on the note affirms that the note " adequately documents the care provided.    Patient was given instructions and counseling regarding her condition. Please see specific information pulled into the AVS if appropriate.     Ana Cristina Shah, HODAN  09/20/2022

## 2022-09-17 NOTE — PROGRESS NOTES
"Chief Complaint  Follow-up    Subjective          History of Present Illness  The patient is here to follow up on bilateral mastectomy.  They are doing well and have no complaints.  Pathology is shown below:    Results for orders placed or performed during the hospital encounter of 09/13/22   POC Glucose Once    Specimen: Blood   Result Value Ref Range    Glucose 115 (H) 70 - 99 mg/dL   POC Glucose Once    Specimen: Blood   Result Value Ref Range    Glucose 157 (H) 70 - 99 mg/dL   Tissue Pathology Exam    Specimen: A: Breast, Right; Tissue    B: Breast, Left; Tissue   Result Value Ref Range    Case Report       Surgical Pathology Report                         Case: [id number]                                  Authorizing Provider:  Kaya Mills MD    Collected:           09/13/2022 02:19 PM          Ordering Location:     [facility]  Received:            09/14/2022 06:21 AM                                 OR                                                                           Pathologist:           Kelley Ojeda DO                                                       Specimens:   1) - Breast, Right, right breast tissue fresh for permanent                                         2) - Breast, Left, left breast tissue fresh for permanent                                  Clinical Information       BRCA gene mutation positive      Final Diagnosis       1.  Right breast, simple mastectomy:   - Benign breast tissue   - Benign nipple and skin    2.  Left breast tissue, simple mastectomy:   - Benign breast tissue   - Benign nipple and skin      Gross Description       1. Breast, Right.  The specimen is received fresh and subsequently placed in formalin, labeled \"right breast tissue fresh for permanent\" and consists of a 1506 g, 32.0 x 22.0 x 5.5 cm right simple mastectomy specimen oriented by the surgeon with a short stitch designating superior and a long stitch designating lateral.  The " "anterior aspect (inked blue) displays a 37.0 x 16.0 cm tan, wrinkled, unremarkable skin ellipse with a 7.0 x 4.0 cm tan, wrinkled areola and a 2.0 x 1.5 x 0.7 cm tan, everted nipple.  The posterior aspect (inked black) displays tan-pink, shaggy connective tissue over lobulated adipose tissue; no muscular tissue is grossly appreciated.  Serially sectioning from medial to lateral reveals 35% white fibrous tissue and 65% lobulated adipose tissue with no distinct lesions, lymph nodes, or biopsy clips/cavities.  Representative sections are submitted as follows: 1A-1B-fibrous tissue from upper inner quadrant; 1C-1D-fibrous tissue from lower inner quadrant; 1E- 1F-fibrous tissue from upper outer quadrant; 1G-1H-fibrous tissue from lower outer quadrant; 1I-nipple, entirely; 1J-superior/inferior skin margins.  Cold ischemic time-31 minutes; total formalin fixation time-31 hours and 10 minutes.      2. Breast, Left.  Received fresh and subsequently placed, labeled \"left breast tissue fresh for permanent\" and consists of a 1220 g, 32.0 x 21.0 x 5.5 cm left simple mastectomy specimen oriented by the surgeon with a short stitch designating superior and a long stitch designating lateral.  The anterior aspect (inked blue) displays a 34.0 x 15.0 cm tan, wrinkled, unremarkable skin ellipse with a 6.0 x 4.0 cm tan, wrinkled areola and a 1.5 x 1.5 x 1.0 cm tan, everted nipple.  The posterior aspect (inked black) displays tan-pink, shaggy connective tissue over lobulated adipose tissue; no muscular tissue is grossly appreciated.  Serially sectioning from medial to lateral reveals 35% white fibrous tissue and 65% lobulated adipose tissue with no distinct lesions, lymph nodes, or biopsy clip/cavities.  Representative sections are submitted as follows: 2A-2B-fibrous tissue from upper outer quadrant; 2C-2D-fibrous tissue from lower outer quadrant; 2E-2F-fibrous tissue from upper inner quadrant; 2G-2H-fibrous tissue from lower inner " "quadrant; 8C-2P-dwjadc, entirely, and superior/inferior skin margins.  Cold ischemic time-29 minutes; total formalin fixation time-31 hours and 10 minutes.  KELVIN        Microscopic Description          Objective   Vital Signs:   Resp 17   Ht 167.6 cm (66\")   Wt 92.5 kg (204 lb)   BMI 32.93 kg/m²     Physical Exam  Vitals and nursing note reviewed.   Constitutional:       General: She is not in acute distress.     Appearance: Normal appearance. She is well-developed.   HENT:      Head: Normocephalic and atraumatic.   Eyes:      Extraocular Movements: Extraocular movements intact.      Pupils: Pupils are equal, round, and reactive to light.   Cardiovascular:      Pulses: Normal pulses.   Pulmonary:      Effort: Pulmonary effort is normal. No retractions.      Breath sounds: Normal air entry. No wheezing.   Abdominal:      General: There is no distension.      Palpations: Abdomen is soft.      Tenderness: There is no abdominal tenderness.      Hernia: No hernia is present.   Musculoskeletal:         General: No swelling or deformity.      Cervical back: Neck supple.   Skin:     General: Skin is warm and dry.      Findings: No erythema.      Comments: Surgical Incision Healing Well   Neurological:      General: No focal deficit present.      Mental Status: She is alert and oriented to person, place, and time.      Motor: Motor function is intact.   Psychiatric:         Mood and Affect: Mood normal.         Thought Content: Thought content normal.            Result Review :                 Assessment and Plan    Diagnoses and all orders for this visit:    1. BRCA gene mutation positive (Primary)  -     HYDROcodone-acetaminophen (Norco) 5-325 MG per tablet; Take 1 tablet by mouth Every 6 (Six) Hours As Needed for Moderate Pain.  Dispense: 20 tablet; Refill: 0    Keep apt with plastics  Followup in one year with me    Follow Up   Return in about 1 year (around 9/21/2023).  Patient was given instructions and counseling " regarding her condition or for health maintenance advice. Please see specific information pulled into the AVS if appropriate.

## 2022-09-23 NOTE — PROGRESS NOTES
"Follow-up (Possible drain removal )            History of Present Illness  Lidia Yeung is a 56 y.o. female who presents to [facility] as a post operative follow up for Bilateral breast reconstruction with placement of implants and mesh, use of spy on 09/13/22.     She is 2wks post op and here for possible drain removal/ she states her BP is high since she's in pain. She says left side feels bruised and tender. She has not been keeping track of her drain output. Has some redness on left breast incision on axilla area and also red around drain site.      Subjective      Atorvastatin, Atorvastatin calcium, Ciprofloxacin, Erythromycin, Fentanyl, Rosuvastatin, Statins, and Penicillins  Allergies Reconciled.    Review of Systems   All review of system has been reviewed and it  is negative except the ones note above.     Objective     BP (!) 170/101 (BP Location: Right arm, Patient Position: Sitting)   Pulse 102   Temp 97.1 °F (36.2 °C)   Ht 167.6 cm (66\")   Wt 92.5 kg (204 lb)   SpO2 94%   BMI 32.93 kg/m²     Body mass index is 32.93 kg/m².    Physical Exam   Cardiovascular: Normal rate.     Pulmonary/Chest  Effort normal.      Breast  Glue in place over mastectomy incisions with no erythema except a 6 cm area over left lateral side, no drng and no open area, expected ecchymosis and edema, bilateral drains in place with serosanguineous fluid, implants in good position.                                                    Result Review :       Assessment and Plan      Diagnoses and all orders for this visit:    1. Postoperative follow-up (Primary)    Other orders  -     clindamycin (Cleocin) 300 MG capsule; Take 1 capsule by mouth 3 (Three) Times a Day for 10 days.  Dispense: 30 capsule; Refill: 0  -     fluconazole (Diflucan) 150 MG tablet; Take 1 tablet by mouth Every 72 (Seventy-Two) Hours.  Dispense: 2 tablet; Refill: 0        Additional Order(s):       Plan: "   Patient is aware to keep track of drain output. Patient is aware of drain being less than 30CC's for 3 days in a row. Changed dressing on bilateral drain sites. Applied antibiotic ointment to drain sites. Continue applying antibiotic ointment to raw areas. Clindamycin for erythema on lateral side of left breast, possible early skin infection or irriation. RTC Friday for possible drain removal.      Patient was given instructions and counseling regarding her condition. Please see specific information pulled into the AVS if appropriate.     Ana Cristina Shah, APRN  09/27/2022

## 2022-10-03 NOTE — PROGRESS NOTES
Here today to have drain removed.  Left drain is putting out 15CC daily or less.  Right drain is full today at 50CC and per patient she just drained 5-10CC out of it prior to coming in today.  She admits to carrying heavy stuff though.  Will call us Friday to see if she can have right drain pulled.

## 2022-10-05 NOTE — PROGRESS NOTES
I sent a letter to patient regarding overdue order for a urine test and an order for a CT scan of the chest.

## 2022-10-10 NOTE — PROGRESS NOTES
Follow-up (Right breast drain removal)            History of Present Illness  Lidia Yeung is a 56 y.o. female who presents to [facility] as a post operative follow up for Bilateral breast reconstruction with placement of implants and mesh, use of spy on 09/13/22.     Here today for right breast drain removal. Has been 20CC's or below for 3 days. Some redness from tape reaction on right drain site. Is having some pain in left axilla. Pt states she has been over doing it with that left arm. Is wearing compression bra.      Subjective      Atorvastatin, Atorvastatin calcium, Ciprofloxacin, Erythromycin, Fentanyl, Rosuvastatin, Statins, and Penicillins  Allergies Reconciled.    Review of Systems   All review of system has been reviewed and it  is negative except the ones note above.     Objective     There were no vitals taken for this visit.    There is no height or weight on file to calculate BMI.    Physical Exam   Cardiovascular: Normal rate.     Pulmonary/Chest  Effort normal.      Breast  Glue in place over mastectomy incisions with no erythema except a 6 cm area over left lateral side, no drng and no open area, expected ecchymosis and edema, right drain in place with serosanguineous fluid, implants in good position.                                                    Result Review :       Assessment and Plan      There are no diagnoses linked to this encounter.    Additional Order(s):       Plan:   Removed right breast drain, applied gauze and bacitracin oint to drain sites. Keep drain sites clean and dry, apply bacitracin in thin layer daily until healed, continue compression bra, limit upper body activity. RTC as scheduled.    Scribed by Jana Mi MA, acting as a scribe for No name on file, 10/10/22 11:00 EDT.  No name on file's signature on the note affirms that the note adequately documents the care provided.      Patient was given instructions and  counseling regarding her condition. Please see specific information pulled into the AVS if appropriate.     Jana Mi MA  10/10/2022

## 2022-10-10 NOTE — PROGRESS NOTES
No chief complaint on file.            History of Present Illness  Lidia Yeung is a 56 y.o. female who presents to [facility] as a post operative follow up for Bilateral breast reconstruction with placement of implants and mesh, use of spy on 09/13/22.     She is 2 wks post op and here for possible drain removal/. She says left side feels bruised and tender. Drain is putting out less than 20 ml daily in right side. Left side is slightly swollen on the side.    Subjective      Atorvastatin, Atorvastatin calcium, Ciprofloxacin, Erythromycin, Fentanyl, Rosuvastatin, Statins, and Penicillins  Allergies Reconciled.    Review of Systems   All review of system has been reviewed and it  is negative except the ones note above.     Objective     There were no vitals taken for this visit.    There is no height or weight on file to calculate BMI.    Physical Exam   Cardiovascular: Normal rate.     Pulmonary/Chest  Effort normal.      Breast  Incisions healing, no drng and no open area, expected  Edema except slightly more over left lateral side, right drain intact with serous fluid, implants in good position.                                                    Result Review :       Assessment and Plan      Diagnoses and all orders for this visit:    1. Postoperative follow-up (Primary)    2. Post-op pain    3. BRCA gene mutation positive  -     HYDROcodone-acetaminophen (Norco) 5-325 MG per tablet; Take 1-2 tablets by mouth Every 6 (Six) Hours As Needed for Moderate Pain.  Dispense: 20 tablet; Refill: 0        Additional Order(s):       Plan:   Drain removed, Applied antibiotic ointment to drain sites. Let side redness has resolved. Aspirated 60 ml from left lateral side, serous fluid.  Would like refill on pain medication, rates 7/10. RTC one week.       Patient was given instructions and counseling regarding her condition. Please see specific information pulled into the AVS  if appropriate.     Ana Cristina Shah, APRN  10/10/2022

## 2022-10-11 NOTE — PROGRESS NOTES
"Post-op Follow-up (1m post op follow up )        History of Present Illness  Lidia Yeung is a 57 y.o. female who presents to [facility] as a post operative follow up for Bilateral breast reconstruction with placement of implants and mesh, use of spy on 09/13/22.     She is 1m post op, she states she has opening on the left side and states it is a burning feeling. Her pain level is 3/10    Subjective      Atorvastatin, Atorvastatin calcium, Ciprofloxacin, Erythromycin, Fentanyl, Rosuvastatin, Statins, and Penicillins  Allergies Reconciled.    Review of Systems   All review of system has been reviewed and it  is negative except the ones note above.     Objective     /86 (BP Location: Right arm)   Pulse (!) 122   Temp 98.7 °F (37.1 °C) (Temporal)   Ht 167.6 cm (66\")   Wt 90.7 kg (200 lb)   SpO2 97%   BMI 32.28 kg/m²     Body mass index is 32.28 kg/m².    Physical Exam   Cardiovascular: Normal rate.     Pulmonary/Chest  Effort normal.      Breast  Incisions healing well with a few exposed sutures, no erythema or warmth, tenderness over lateral side with mild swelling    Result Review :       Assessment and Plan      Diagnoses and all orders for this visit:    1. Postoperative follow-up (Primary)        Additional Order(s):       Plan:   Trimmed sutures from bilateral breasts. Aspirated 60CC's from left breast and 50CC's from right breast. Applied bacitracin ointment with gauze. Monitor for more swelling, redness or infection. Start wearing compression bra. RTC 2 weeks for fluid check.    Scribed by Jana Mi MA, acting as a scribe for HODAN Fuentes, 10/18/22 15:02 EDT.  HODAN Fuentes's signature on the note affirms that the note adequately documents the care provided.      Patient was given instructions and counseling regarding her condition. Please see specific information pulled into the AVS if appropriate.     Ana Cristina Shah, " APRN  10/18/2022

## 2022-11-16 NOTE — PROGRESS NOTES
"Post-op Follow-up (Follow up Seroma)        History of Present Illness  Lidia eYung is a 57 y.o. female who presents to [facility] as a post operative follow up for Bilateral breast reconstruction with placement of implants and mesh, use of spy on 09/13/22.     Here today to follow up on fluid bilateral breast. She feels like her breasts are full and tender. No pain in breasts, but states having lower back pain.     Subjective      Atorvastatin, Atorvastatin calcium, Ciprofloxacin, Erythromycin, Fentanyl, Rosuvastatin, Statins, and Penicillins  Allergies Reconciled.    Review of Systems   All review of system has been reviewed and it  is negative except the ones note above.     Objective     /94 (BP Location: Left arm) Comment: due to lower back pain 7/10  Pulse 108   Temp 97.8 °F (36.6 °C) (Temporal)   Ht 167.6 cm (66\")   Wt 96.9 kg (213 lb 9.6 oz)   SpO2 94%   BMI 34.48 kg/m²     Body mass index is 34.48 kg/m².    Physical Exam   Cardiovascular: Normal rate.     Pulmonary/Chest  Effort normal.      Breast  Incisions healing well with a few exposed sutures, no erythema or warmth, tenderness over lateral side with mild swelling    Result Review :       Assessment and Plan      Diagnoses and all orders for this visit:    1. Postoperative follow-up (Primary)        Additional Order(s):       Plan:   Breast improved. No fluid today. Wear compression bra and perform scar massage. Patient is aware to call us if symptoms worsen. RTC 3 months    Scribed by Jana Mi MA, acting as a scribe for HODAN Fuentes, 11/21/22 16:55 EST.  HODAN Fuentes's signature on the note affirms that the note adequately documents the care provided.        Patient was given instructions and counseling regarding her condition. Please see specific information pulled into the AVS if appropriate.     HODAN Fuentes  11/21/2022      "

## 2022-12-06 NOTE — PROGRESS NOTES
"Chief Complaint    Flank Pain (LT flank pain/swelling. H/O kidney stones and stents. )    Subjective      Lidia Yeung presents to [facility]    History of Present Illness    1.) LEFT FLANK PAIN : History of urolithiasis. Patient presents w/ complaints of several days of left flank pain. She also reports burning with urination. She is not c/o fevers or chills.     Objective     Vital Signs:     /76 (BP Location: Left arm, Patient Position: Sitting, Cuff Size: Adult)   Pulse (!) 125   Temp 96.9 °F (36.1 °C) (Temporal)   Ht 167.6 cm (66\")   Wt 94 kg (207 lb 3.2 oz)   SpO2 97%   BMI 33.44 kg/m²       Physical Exam  Vitals reviewed.   Constitutional:       General: She is not in acute distress.     Appearance: Normal appearance. She is well-developed.   HENT:      Head: Normocephalic and atraumatic.      Right Ear: Hearing and external ear normal.      Left Ear: Hearing and external ear normal.      Nose: Nose normal.   Eyes:      General: Lids are normal.         Right eye: No discharge.         Left eye: No discharge.      Conjunctiva/sclera: Conjunctivae normal.   Pulmonary:      Effort: Pulmonary effort is normal.   Abdominal:      General: There is no distension.   Musculoskeletal:         General: No swelling.      Cervical back: Neck supple.   Skin:     Coloration: Skin is not jaundiced.      Findings: No erythema.   Neurological:      Mental Status: She is alert. Mental status is at baseline.   Psychiatric:         Mood and Affect: Mood and affect normal.         Thought Content: Thought content normal.     Assessment and Plan     Diagnoses and all orders for this visit:    1. Flank pain (Primary)  Comments:  UA does not appear to suggest an infection. KUB reviewed. Will await radiology report. Will offer CT per review of radiology report.  Orders:  -     POCT urinalysis dipstick, automated  -     XR Abdomen KUB; Future      Follow Up : As needed.     Patient was " given instructions and counseling regarding her condition or for health maintenance advice. Please see specific information pulled into the AVS if appropriate.

## 2022-12-12 NOTE — PROGRESS NOTES
"Post-op Follow-up (3m post op follow up )        History of Present Illness  Lidia Yeung is a 57 y.o. female who presents to [facility] as a post operative follow up for Bilateral breast reconstruction with placement of implants and mesh, use of spy on 09/13/22.     Here for 3 month post op, she fell recently and wants to make sure it didn't hurt implants. She has bruising and abrasion.  She received a blunt trauma to the right axilla.    Subjective      Atorvastatin, Atorvastatin calcium, Ciprofloxacin, Erythromycin, Fentanyl, Rosuvastatin, Statins, and Penicillins  Allergies Reconciled.    Review of Systems   All review of system has been reviewed and it  is negative except the ones note above.     Objective     BP (!) 177/111 (BP Location: Left arm)   Pulse 109   Temp 98.6 °F (37 °C) (Temporal)   Ht 167.6 cm (66\")   Wt 96.1 kg (211 lb 12.8 oz)   SpO2 98%   BMI 34.19 kg/m²     Body mass index is 34.19 kg/m².    Physical Exam   Cardiovascular: Normal rate.     Pulmonary/Chest  Effort normal.      Breast: Patient with some resolving bruising on the right axilla, lateral to the breast.  Both breasts are doing well with intact implants that are soft mobile within the pocket.  No fluctuance noted.  There is some associated swelling of the right axilla consistent with the visual examination of a resolving bruise and consistent with a history of trauma to this area.  No cellulitis or induration.  No fluctuance    Result Review :       Assessment and Plan      Diagnoses and all orders for this visit:    1. Intercostal pain (Primary)        Additional Order(s):       Plan:   Long discussion with patient regarding her swelling.  It would be expected that there will be some swelling after receiving a blunt injury to the right axilla.  There is visible bruising, also consistent with swelling.  The patient states she has gained more weight than usual and states that " her face is puffy as are her hands.  The patient is on a thiazide diuretic already.  The patient has kidney stones and states she has a kidney stone presently.  It is possible some of this is water weight, but that would be for her nephrologist or kidney doctor to evaluate.  With regards to the swelling in the right axilla, this is felt to be consistent with a recent history of trauma.  This will resolve as the wound heals.  Over the next 3 to 4 weeks.        Patient was given instructions and counseling regarding her condition. Please see specific information pulled into the AVS if appropriate.     Neil Vega MD  12/21/2022

## 2023-01-01 ENCOUNTER — OFFICE VISIT (OUTPATIENT)
Dept: PLASTIC SURGERY | Facility: CLINIC | Age: 58
End: 2023-01-01
Payer: COMMERCIAL

## 2023-01-01 ENCOUNTER — LAB (OUTPATIENT)
Dept: LAB | Facility: HOSPITAL | Age: 58
End: 2023-01-01
Payer: COMMERCIAL

## 2023-01-01 ENCOUNTER — OFFICE VISIT (OUTPATIENT)
Dept: FAMILY MEDICINE CLINIC | Facility: CLINIC | Age: 58
End: 2023-01-01
Payer: COMMERCIAL

## 2023-01-01 ENCOUNTER — HOSPITAL ENCOUNTER (OUTPATIENT)
Dept: CT IMAGING | Facility: HOSPITAL | Age: 58
Discharge: HOME | End: 2023-01-18
Admitting: FAMILY MEDICINE
Payer: COMMERCIAL

## 2023-01-01 ENCOUNTER — READMISSION MANAGEMENT (OUTPATIENT)
Dept: CALL CENTER | Facility: HOSPITAL | Age: 58
End: 2023-01-01
Payer: COMMERCIAL

## 2023-01-01 ENCOUNTER — APPOINTMENT (OUTPATIENT)
Dept: GENERAL RADIOLOGY | Facility: HOSPITAL | Age: 58
DRG: 297 | End: 2023-01-01
Payer: COMMERCIAL

## 2023-01-01 ENCOUNTER — OFFICE VISIT (OUTPATIENT)
Dept: SURGERY | Facility: CLINIC | Age: 58
End: 2023-01-01
Payer: COMMERCIAL

## 2023-01-01 ENCOUNTER — APPOINTMENT (OUTPATIENT)
Dept: GENERAL RADIOLOGY | Facility: HOSPITAL | Age: 58
End: 2023-01-01
Payer: COMMERCIAL

## 2023-01-01 ENCOUNTER — HOSPITAL ENCOUNTER (INPATIENT)
Facility: HOSPITAL | Age: 58
LOS: 1 days | Discharge: HOME | DRG: 641 | End: 2023-01-06
Attending: EMERGENCY MEDICINE | Admitting: INTERNAL MEDICINE
Payer: COMMERCIAL

## 2023-01-01 ENCOUNTER — APPOINTMENT (OUTPATIENT)
Dept: CT IMAGING | Facility: HOSPITAL | Age: 58
DRG: 641 | End: 2023-01-01
Payer: COMMERCIAL

## 2023-01-01 ENCOUNTER — TRANSITIONAL CARE MANAGEMENT TELEPHONE ENCOUNTER (OUTPATIENT)
Dept: CALL CENTER | Facility: HOSPITAL | Age: 58
End: 2023-01-01
Payer: COMMERCIAL

## 2023-01-01 ENCOUNTER — HOSPITAL ENCOUNTER (EMERGENCY)
Facility: HOSPITAL | Age: 58
DRG: 297 | End: 2023-02-24
Attending: STUDENT IN AN ORGANIZED HEALTH CARE EDUCATION/TRAINING PROGRAM | Admitting: INTERNAL MEDICINE
Payer: COMMERCIAL

## 2023-01-01 ENCOUNTER — HOSPITAL ENCOUNTER (OUTPATIENT)
Dept: ULTRASOUND IMAGING | Facility: HOSPITAL | Age: 58
Discharge: HOME | End: 2023-02-08
Admitting: FAMILY MEDICINE
Payer: COMMERCIAL

## 2023-01-01 ENCOUNTER — TELEPHONE (OUTPATIENT)
Dept: FAMILY MEDICINE CLINIC | Facility: CLINIC | Age: 58
End: 2023-01-01
Payer: COMMERCIAL

## 2023-01-01 ENCOUNTER — APPOINTMENT (OUTPATIENT)
Dept: GENERAL RADIOLOGY | Facility: HOSPITAL | Age: 58
DRG: 641 | End: 2023-01-01
Payer: COMMERCIAL

## 2023-01-01 ENCOUNTER — HOSPITAL ENCOUNTER (EMERGENCY)
Facility: HOSPITAL | Age: 58
Discharge: HOME | End: 2023-02-08
Attending: EMERGENCY MEDICINE | Admitting: EMERGENCY MEDICINE
Payer: COMMERCIAL

## 2023-01-01 VITALS
SYSTOLIC BLOOD PRESSURE: 184 MMHG | HEART RATE: 86 BPM | OXYGEN SATURATION: 95 % | WEIGHT: 208.4 LBS | TEMPERATURE: 98.9 F | BODY MASS INDEX: 38.35 KG/M2 | HEIGHT: 62 IN | DIASTOLIC BLOOD PRESSURE: 91 MMHG

## 2023-01-01 VITALS
SYSTOLIC BLOOD PRESSURE: 97 MMHG | OXYGEN SATURATION: 69 % | SYSTOLIC BLOOD PRESSURE: 97 MMHG | WEIGHT: 209.22 LBS | WEIGHT: 209.22 LBS | DIASTOLIC BLOOD PRESSURE: 66 MMHG | RESPIRATION RATE: 24 BRPM | RESPIRATION RATE: 24 BRPM | OXYGEN SATURATION: 69 % | DIASTOLIC BLOOD PRESSURE: 66 MMHG

## 2023-01-01 VITALS
HEART RATE: 121 BPM | HEIGHT: 62 IN | BODY MASS INDEX: 38.28 KG/M2 | TEMPERATURE: 98 F | OXYGEN SATURATION: 97 % | SYSTOLIC BLOOD PRESSURE: 199 MMHG | WEIGHT: 208 LBS | DIASTOLIC BLOOD PRESSURE: 94 MMHG

## 2023-01-01 VITALS — HEIGHT: 66 IN | WEIGHT: 204.4 LBS | BODY MASS INDEX: 32.85 KG/M2

## 2023-01-01 VITALS
HEART RATE: 86 BPM | DIASTOLIC BLOOD PRESSURE: 92 MMHG | SYSTOLIC BLOOD PRESSURE: 144 MMHG | TEMPERATURE: 98.4 F | RESPIRATION RATE: 18 BRPM | OXYGEN SATURATION: 92 % | BODY MASS INDEX: 37.77 KG/M2 | WEIGHT: 205.25 LBS | HEIGHT: 62 IN

## 2023-01-01 VITALS
SYSTOLIC BLOOD PRESSURE: 131 MMHG | WEIGHT: 205 LBS | BODY MASS INDEX: 37.73 KG/M2 | TEMPERATURE: 98 F | DIASTOLIC BLOOD PRESSURE: 84 MMHG | HEIGHT: 62 IN | OXYGEN SATURATION: 94 % | HEART RATE: 106 BPM

## 2023-01-01 VITALS
OXYGEN SATURATION: 94 % | DIASTOLIC BLOOD PRESSURE: 87 MMHG | WEIGHT: 205.47 LBS | RESPIRATION RATE: 16 BRPM | SYSTOLIC BLOOD PRESSURE: 152 MMHG | TEMPERATURE: 98 F | BODY MASS INDEX: 33.02 KG/M2 | HEIGHT: 66 IN | HEART RATE: 78 BPM

## 2023-01-01 VITALS
DIASTOLIC BLOOD PRESSURE: 84 MMHG | HEART RATE: 90 BPM | WEIGHT: 205.6 LBS | SYSTOLIC BLOOD PRESSURE: 130 MMHG | TEMPERATURE: 97.3 F | BODY MASS INDEX: 37.6 KG/M2 | OXYGEN SATURATION: 96 %

## 2023-01-01 VITALS
HEART RATE: 100 BPM | DIASTOLIC BLOOD PRESSURE: 82 MMHG | SYSTOLIC BLOOD PRESSURE: 158 MMHG | TEMPERATURE: 96.9 F | OXYGEN SATURATION: 97 %

## 2023-01-01 DIAGNOSIS — E03.9 HYPOTHYROIDISM, UNSPECIFIED TYPE: ICD-10-CM

## 2023-01-01 DIAGNOSIS — N20.0 KIDNEY STONES: ICD-10-CM

## 2023-01-01 DIAGNOSIS — R07.9 CHEST PAIN, UNSPECIFIED TYPE: ICD-10-CM

## 2023-01-01 DIAGNOSIS — K63.9 BOWEL DISEASE: ICD-10-CM

## 2023-01-01 DIAGNOSIS — R52 INTRACTABLE PAIN: ICD-10-CM

## 2023-01-01 DIAGNOSIS — I46.9 CARDIAC ARREST: Primary | ICD-10-CM

## 2023-01-01 DIAGNOSIS — R10.9 FLANK PAIN: Primary | ICD-10-CM

## 2023-01-01 DIAGNOSIS — R79.89 ELEVATED LFTS: ICD-10-CM

## 2023-01-01 DIAGNOSIS — Z09 HOSPITAL DISCHARGE FOLLOW-UP: Primary | ICD-10-CM

## 2023-01-01 DIAGNOSIS — R10.30 LOWER ABDOMINAL PAIN: ICD-10-CM

## 2023-01-01 DIAGNOSIS — E78.2 MIXED HYPERLIPIDEMIA: ICD-10-CM

## 2023-01-01 DIAGNOSIS — R91.1 LUNG NODULE: ICD-10-CM

## 2023-01-01 DIAGNOSIS — E11.65 TYPE 2 DIABETES MELLITUS WITH HYPERGLYCEMIA, WITHOUT LONG-TERM CURRENT USE OF INSULIN: ICD-10-CM

## 2023-01-01 DIAGNOSIS — K21.9 GASTROESOPHAGEAL REFLUX DISEASE, UNSPECIFIED WHETHER ESOPHAGITIS PRESENT: Primary | ICD-10-CM

## 2023-01-01 DIAGNOSIS — K21.9 GASTROESOPHAGEAL REFLUX DISEASE, UNSPECIFIED WHETHER ESOPHAGITIS PRESENT: ICD-10-CM

## 2023-01-01 DIAGNOSIS — N64.89 BREAST HEMATOMA: Primary | ICD-10-CM

## 2023-01-01 DIAGNOSIS — Z90.10 ABSENT NIPPLE: ICD-10-CM

## 2023-01-01 DIAGNOSIS — R10.30 LOWER ABDOMINAL PAIN: Primary | ICD-10-CM

## 2023-01-01 DIAGNOSIS — K40.20 BILATERAL INGUINAL HERNIA WITHOUT OBSTRUCTION OR GANGRENE, RECURRENCE NOT SPECIFIED: ICD-10-CM

## 2023-01-01 DIAGNOSIS — I10 PRIMARY HYPERTENSION: ICD-10-CM

## 2023-01-01 DIAGNOSIS — K58.9 IRRITABLE BOWEL SYNDROME, UNSPECIFIED TYPE: ICD-10-CM

## 2023-01-01 DIAGNOSIS — K64.9 HEMORRHOIDS, UNSPECIFIED HEMORRHOID TYPE: ICD-10-CM

## 2023-01-01 DIAGNOSIS — R58 ECCHYMOSIS: Primary | ICD-10-CM

## 2023-01-01 DIAGNOSIS — R10.31 RIGHT INGUINAL PAIN: ICD-10-CM

## 2023-01-01 DIAGNOSIS — R07.9 CHEST PAIN, UNSPECIFIED TYPE: Primary | ICD-10-CM

## 2023-01-01 DIAGNOSIS — K62.5 RECTAL BLEEDING: ICD-10-CM

## 2023-01-01 DIAGNOSIS — K40.20 BILATERAL INGUINAL HERNIA WITHOUT OBSTRUCTION OR GANGRENE, RECURRENCE NOT SPECIFIED: Primary | ICD-10-CM

## 2023-01-01 DIAGNOSIS — R77.8 ELEVATED TROPONIN: ICD-10-CM

## 2023-01-01 DIAGNOSIS — K40.20 NON-RECURRENT BILATERAL INGUINAL HERNIA WITHOUT OBSTRUCTION OR GANGRENE: Primary | ICD-10-CM

## 2023-01-01 LAB
ABO GROUP BLD: NORMAL
ABO GROUP BLD: NORMAL
ALBUMIN SERPL-MCNC: 3.2 G/DL (ref 3.5–5.2)
ALBUMIN SERPL-MCNC: 3.2 G/DL (ref 3.5–5.2)
ALBUMIN SERPL-MCNC: 4.2 G/DL (ref 3.5–5.2)
ALBUMIN SERPL-MCNC: 4.7 G/DL (ref 3.5–5.2)
ALBUMIN SERPL-MCNC: 4.8 G/DL (ref 3.5–5.2)
ALBUMIN SERPL-MCNC: 4.9 G/DL (ref 3.5–5.2)
ALBUMIN UR-MCNC: 2.9 MG/DL
ALBUMIN/GLOB SERPL: 1.5 G/DL
ALBUMIN/GLOB SERPL: 1.5 G/DL
ALBUMIN/GLOB SERPL: 1.7 G/DL
ALBUMIN/GLOB SERPL: 1.8 G/DL
ALBUMIN/GLOB SERPL: 2.1 G/DL
ALP SERPL-CCNC: 100 U/L (ref 39–117)
ALP SERPL-CCNC: 102 U/L (ref 39–117)
ALP SERPL-CCNC: 102 U/L (ref 39–117)
ALP SERPL-CCNC: 107 U/L (ref 39–117)
ALP SERPL-CCNC: 114 U/L (ref 39–117)
ALP SERPL-CCNC: 126 U/L (ref 39–117)
ALP SERPL-CCNC: 97 U/L (ref 39–117)
ALP SERPL-CCNC: 99 U/L (ref 39–117)
ALT SERPL W P-5'-P-CCNC: 272 U/L (ref 1–33)
ALT SERPL W P-5'-P-CCNC: 272 U/L (ref 1–33)
ALT SERPL W P-5'-P-CCNC: 41 U/L (ref 1–33)
ALT SERPL W P-5'-P-CCNC: 44 U/L (ref 1–33)
ALT SERPL W P-5'-P-CCNC: 48 U/L (ref 1–33)
ALT SERPL W P-5'-P-CCNC: 52 U/L (ref 1–33)
ALT SERPL W P-5'-P-CCNC: 66 U/L (ref 1–33)
ALT SERPL W P-5'-P-CCNC: 77 U/L (ref 1–33)
ANION GAP SERPL CALCULATED.3IONS-SCNC: 10 MMOL/L (ref 5–15)
ANION GAP SERPL CALCULATED.3IONS-SCNC: 11.8 MMOL/L (ref 5–15)
ANION GAP SERPL CALCULATED.3IONS-SCNC: 14 MMOL/L (ref 5–15)
ANION GAP SERPL CALCULATED.3IONS-SCNC: 14.6 MMOL/L (ref 5–15)
ANION GAP SERPL CALCULATED.3IONS-SCNC: 18.5 MMOL/L (ref 5–15)
ANION GAP SERPL CALCULATED.3IONS-SCNC: 25.9 MMOL/L (ref 5–15)
ANION GAP SERPL CALCULATED.3IONS-SCNC: 25.9 MMOL/L (ref 5–15)
ANION GAP SERPL CALCULATED.3IONS-SCNC: 8.6 MMOL/L (ref 5–15)
ARTERIAL PATENCY WRIST A: POSITIVE
ARTERIAL PATENCY WRIST A: POSITIVE
AST SERPL-CCNC: 312 U/L (ref 1–32)
AST SERPL-CCNC: 312 U/L (ref 1–32)
AST SERPL-CCNC: 35 U/L (ref 1–32)
AST SERPL-CCNC: 39 U/L (ref 1–32)
AST SERPL-CCNC: 39 U/L (ref 1–32)
AST SERPL-CCNC: 43 U/L (ref 1–32)
AST SERPL-CCNC: 49 U/L (ref 1–32)
AST SERPL-CCNC: 71 U/L (ref 1–32)
BACTERIA SPEC AEROBE CULT: NORMAL
BACTERIA SPEC AEROBE CULT: NORMAL
BACTERIA UR QL AUTO: ABNORMAL /HPF
BASE EXCESS BLDA CALC-SCNC: -18.5 MMOL/L (ref -2–2)
BASE EXCESS BLDA CALC-SCNC: -18.5 MMOL/L (ref -2–2)
BASOPHILS # BLD AUTO: 0.03 10*3/MM3 (ref 0–0.2)
BASOPHILS # BLD AUTO: 0.05 10*3/MM3 (ref 0–0.2)
BASOPHILS # BLD AUTO: 0.05 10*3/MM3 (ref 0–0.2)
BASOPHILS # BLD AUTO: 0.06 10*3/MM3 (ref 0–0.2)
BASOPHILS # BLD AUTO: 0.08 10*3/MM3 (ref 0–0.2)
BASOPHILS NFR BLD AUTO: 0.4 % (ref 0–1.5)
BASOPHILS NFR BLD AUTO: 0.5 % (ref 0–1.5)
BASOPHILS NFR BLD AUTO: 0.6 % (ref 0–1.5)
BASOPHILS NFR BLD AUTO: 0.7 % (ref 0–1.5)
BASOPHILS NFR BLD AUTO: 0.8 % (ref 0–1.5)
BDY SITE: ABNORMAL
BDY SITE: ABNORMAL
BILIRUB SERPL-MCNC: 0.2 MG/DL (ref 0–1.2)
BILIRUB SERPL-MCNC: 0.2 MG/DL (ref 0–1.2)
BILIRUB SERPL-MCNC: 0.4 MG/DL (ref 0–1.2)
BILIRUB SERPL-MCNC: 0.5 MG/DL (ref 0–1.2)
BILIRUB SERPL-MCNC: 0.6 MG/DL (ref 0–1.2)
BILIRUB SERPL-MCNC: 0.7 MG/DL (ref 0–1.2)
BILIRUB UR QL STRIP: NEGATIVE
BLD GP AB SCN SERPL QL: NEGATIVE
BLD GP AB SCN SERPL QL: NEGATIVE
BUN SERPL-MCNC: 13 MG/DL (ref 6–20)
BUN SERPL-MCNC: 15 MG/DL (ref 6–20)
BUN SERPL-MCNC: 16 MG/DL (ref 6–20)
BUN SERPL-MCNC: 16 MG/DL (ref 6–20)
BUN SERPL-MCNC: 20 MG/DL (ref 6–20)
BUN/CREAT SERPL: 13.7 (ref 7–25)
BUN/CREAT SERPL: 13.7 (ref 7–25)
BUN/CREAT SERPL: 15.2 (ref 7–25)
BUN/CREAT SERPL: 17.6 (ref 7–25)
BUN/CREAT SERPL: 17.8 (ref 7–25)
BUN/CREAT SERPL: 18.3 (ref 7–25)
BUN/CREAT SERPL: 19.7 (ref 7–25)
BUN/CREAT SERPL: 21.3 (ref 7–25)
CA-I BLDA-SCNC: 1.16 MMOL/L (ref 1.13–1.32)
CA-I BLDA-SCNC: 1.16 MMOL/L (ref 1.13–1.32)
CALCIUM SPEC-SCNC: 10.3 MG/DL (ref 8.6–10.5)
CALCIUM SPEC-SCNC: 10.3 MG/DL (ref 8.6–10.5)
CALCIUM SPEC-SCNC: 10.6 MG/DL (ref 8.6–10.5)
CALCIUM SPEC-SCNC: 10.6 MG/DL (ref 8.6–10.5)
CALCIUM SPEC-SCNC: 8.9 MG/DL (ref 8.6–10.5)
CALCIUM SPEC-SCNC: 8.9 MG/DL (ref 8.6–10.5)
CALCIUM SPEC-SCNC: 9.7 MG/DL (ref 8.6–10.5)
CALCIUM SPEC-SCNC: 9.8 MG/DL (ref 8.6–10.5)
CHLORIDE BLDA-SCNC: 96 MMOL/L (ref 98–106)
CHLORIDE BLDA-SCNC: 96 MMOL/L (ref 98–106)
CHLORIDE SERPL-SCNC: 100 MMOL/L (ref 98–107)
CHLORIDE SERPL-SCNC: 103 MMOL/L (ref 98–107)
CHLORIDE SERPL-SCNC: 104 MMOL/L (ref 98–107)
CHLORIDE SERPL-SCNC: 96 MMOL/L (ref 98–107)
CHLORIDE SERPL-SCNC: 96 MMOL/L (ref 98–107)
CHLORIDE SERPL-SCNC: 98 MMOL/L (ref 98–107)
CHLORIDE SERPL-SCNC: 99 MMOL/L (ref 98–107)
CHLORIDE SERPL-SCNC: 99 MMOL/L (ref 98–107)
CHOLEST SERPL-MCNC: 298 MG/DL (ref 0–200)
CLARITY UR: CLEAR
CO2 SERPL-SCNC: 22.5 MMOL/L (ref 22–29)
CO2 SERPL-SCNC: 23.1 MMOL/L (ref 22–29)
CO2 SERPL-SCNC: 23.1 MMOL/L (ref 22–29)
CO2 SERPL-SCNC: 25.4 MMOL/L (ref 22–29)
CO2 SERPL-SCNC: 28 MMOL/L (ref 22–29)
CO2 SERPL-SCNC: 28.4 MMOL/L (ref 22–29)
CO2 SERPL-SCNC: 30.2 MMOL/L (ref 22–29)
CO2 SERPL-SCNC: 32 MMOL/L (ref 22–29)
COHGB MFR BLD: 0.7 % (ref 0–1.5)
COHGB MFR BLD: 0.7 % (ref 0–1.5)
COLOR UR: YELLOW
CREAT SERPL-MCNC: 0.73 MG/DL (ref 0.57–1)
CREAT SERPL-MCNC: 0.76 MG/DL (ref 0.57–1)
CREAT SERPL-MCNC: 0.82 MG/DL (ref 0.57–1)
CREAT SERPL-MCNC: 0.85 MG/DL (ref 0.57–1)
CREAT SERPL-MCNC: 0.94 MG/DL (ref 0.57–1)
CREAT SERPL-MCNC: 0.99 MG/DL (ref 0.57–1)
CREAT SERPL-MCNC: 1.17 MG/DL (ref 0.57–1)
CREAT SERPL-MCNC: 1.17 MG/DL (ref 0.57–1)
D-LACTATE SERPL-SCNC: 1.2 MMOL/L (ref 0.5–2)
D-LACTATE SERPL-SCNC: 18 MMOL/L (ref 0.5–2)
D-LACTATE SERPL-SCNC: 18 MMOL/L (ref 0.5–2)
D-LACTATE SERPL-SCNC: 2.1 MMOL/L (ref 0.5–2)
D-LACTATE SERPL-SCNC: 2.2 MMOL/L (ref 0.5–2)
D-LACTATE SERPL-SCNC: 2.2 MMOL/L (ref 0.5–2)
D-LACTATE SERPL-SCNC: 2.9 MMOL/L (ref 0.5–2)
D-LACTATE SERPL-SCNC: 4.3 MMOL/L (ref 0.5–2)
D-LACTATE SERPL-SCNC: 4.8 MMOL/L (ref 0.5–2)
DACRYOCYTES BLD QL SMEAR: NORMAL
DEPRECATED RDW RBC AUTO: 43.7 FL (ref 37–54)
DEPRECATED RDW RBC AUTO: 45.5 FL (ref 37–54)
DEPRECATED RDW RBC AUTO: 46.8 FL (ref 37–54)
DEPRECATED RDW RBC AUTO: 47 FL (ref 37–54)
DEPRECATED RDW RBC AUTO: 47.8 FL (ref 37–54)
EGFRCR SERPLBLD CKD-EPI 2021: 54.2 ML/MIN/1.73
EGFRCR SERPLBLD CKD-EPI 2021: 54.2 ML/MIN/1.73
EGFRCR SERPLBLD CKD-EPI 2021: 66.2 ML/MIN/1.73
EGFRCR SERPLBLD CKD-EPI 2021: 70.5 ML/MIN/1.73
EGFRCR SERPLBLD CKD-EPI 2021: 79.5 ML/MIN/1.73
EGFRCR SERPLBLD CKD-EPI 2021: 83 ML/MIN/1.73
EGFRCR SERPLBLD CKD-EPI 2021: 91 ML/MIN/1.73
EGFRCR SERPLBLD CKD-EPI 2021: 95.5 ML/MIN/1.73
EOSINOPHIL # BLD AUTO: 0.08 10*3/MM3 (ref 0–0.4)
EOSINOPHIL # BLD AUTO: 0.15 10*3/MM3 (ref 0–0.4)
EOSINOPHIL # BLD AUTO: 0.26 10*3/MM3 (ref 0–0.4)
EOSINOPHIL NFR BLD AUTO: 1 % (ref 0.3–6.2)
EOSINOPHIL NFR BLD AUTO: 1.3 % (ref 0.3–6.2)
EOSINOPHIL NFR BLD AUTO: 2.7 % (ref 0.3–6.2)
EOSINOPHIL NFR BLD AUTO: 3.4 % (ref 0.3–6.2)
EOSINOPHIL NFR BLD AUTO: 3.4 % (ref 0.3–6.2)
ERYTHROCYTE [DISTWIDTH] IN BLOOD BY AUTOMATED COUNT: 13.8 % (ref 12.3–15.4)
ERYTHROCYTE [DISTWIDTH] IN BLOOD BY AUTOMATED COUNT: 13.8 % (ref 12.3–15.4)
ERYTHROCYTE [DISTWIDTH] IN BLOOD BY AUTOMATED COUNT: 13.9 % (ref 12.3–15.4)
ERYTHROCYTE [DISTWIDTH] IN BLOOD BY AUTOMATED COUNT: 13.9 % (ref 12.3–15.4)
ERYTHROCYTE [DISTWIDTH] IN BLOOD BY AUTOMATED COUNT: 14.4 % (ref 12.3–15.4)
FHHB: 4.3 % (ref 0–5)
FHHB: 4.3 % (ref 0–5)
FLUAV AG NPH QL: NEGATIVE
FLUBV AG NPH QL IA: NEGATIVE
GAS FLOW AIRWAY: ABNORMAL L/MIN
GAS FLOW AIRWAY: ABNORMAL L/MIN
GEN 5 2HR TROPONIN T REFLEX: 11 NG/L
GLOBULIN UR ELPH-MCNC: 1.9 GM/DL
GLOBULIN UR ELPH-MCNC: 1.9 GM/DL
GLOBULIN UR ELPH-MCNC: 2.3 GM/DL
GLOBULIN UR ELPH-MCNC: 2.4 GM/DL
GLOBULIN UR ELPH-MCNC: 2.8 GM/DL
GLOBULIN UR ELPH-MCNC: 2.9 GM/DL
GLOBULIN UR ELPH-MCNC: 3.1 GM/DL
GLOBULIN UR ELPH-MCNC: 3.2 GM/DL
GLUCOSE BLDA-MCNC: 523 MG/DL (ref 65–99)
GLUCOSE BLDA-MCNC: 523 MG/DL (ref 65–99)
GLUCOSE BLDC GLUCOMTR-MCNC: 135 MG/DL (ref 70–99)
GLUCOSE BLDC GLUCOMTR-MCNC: 138 MG/DL (ref 70–99)
GLUCOSE BLDC GLUCOMTR-MCNC: 169 MG/DL (ref 70–99)
GLUCOSE BLDC GLUCOMTR-MCNC: 171 MG/DL (ref 70–99)
GLUCOSE BLDC GLUCOMTR-MCNC: 182 MG/DL (ref 70–99)
GLUCOSE BLDC GLUCOMTR-MCNC: 195 MG/DL (ref 70–99)
GLUCOSE BLDC GLUCOMTR-MCNC: 224 MG/DL (ref 70–99)
GLUCOSE SERPL-MCNC: 112 MG/DL (ref 65–99)
GLUCOSE SERPL-MCNC: 126 MG/DL (ref 65–99)
GLUCOSE SERPL-MCNC: 141 MG/DL (ref 65–99)
GLUCOSE SERPL-MCNC: 170 MG/DL (ref 65–99)
GLUCOSE SERPL-MCNC: 208 MG/DL (ref 65–99)
GLUCOSE SERPL-MCNC: 234 MG/DL (ref 65–99)
GLUCOSE SERPL-MCNC: 468 MG/DL (ref 65–99)
GLUCOSE SERPL-MCNC: 468 MG/DL (ref 65–99)
GLUCOSE UR STRIP-MCNC: NEGATIVE MG/DL
HBA1C MFR BLD: 7.9 % (ref 4.8–5.6)
HCO3 BLDA-SCNC: 17.4 MMOL/L (ref 22–26)
HCO3 BLDA-SCNC: 17.4 MMOL/L (ref 22–26)
HCT VFR BLD AUTO: 40.6 % (ref 34–46.6)
HCT VFR BLD AUTO: 44.6 % (ref 34–46.6)
HCT VFR BLD AUTO: 44.7 % (ref 34–46.6)
HCT VFR BLD AUTO: 45.2 % (ref 34–46.6)
HCT VFR BLD AUTO: 45.9 % (ref 34–46.6)
HDLC SERPL-MCNC: 39 MG/DL (ref 40–60)
HGB BLD-MCNC: 13.7 G/DL (ref 12–15.9)
HGB BLD-MCNC: 14.8 G/DL (ref 12–15.9)
HGB BLD-MCNC: 14.8 G/DL (ref 12–15.9)
HGB BLD-MCNC: 15.3 G/DL (ref 12–15.9)
HGB BLD-MCNC: 15.6 G/DL (ref 12–15.9)
HGB BLDA-MCNC: 14.5 G/DL (ref 11.7–14.6)
HGB BLDA-MCNC: 14.5 G/DL (ref 11.7–14.6)
HGB UR QL STRIP.AUTO: NEGATIVE
HOLD SPECIMEN: NORMAL
HYALINE CASTS UR QL AUTO: ABNORMAL /LPF
IMM GRANULOCYTES # BLD AUTO: 0.02 10*3/MM3 (ref 0–0.05)
IMM GRANULOCYTES # BLD AUTO: 0.02 10*3/MM3 (ref 0–0.05)
IMM GRANULOCYTES # BLD AUTO: 0.03 10*3/MM3 (ref 0–0.05)
IMM GRANULOCYTES # BLD AUTO: 0.05 10*3/MM3 (ref 0–0.05)
IMM GRANULOCYTES # BLD AUTO: 0.05 10*3/MM3 (ref 0–0.05)
IMM GRANULOCYTES NFR BLD AUTO: 0.3 % (ref 0–0.5)
IMM GRANULOCYTES NFR BLD AUTO: 0.3 % (ref 0–0.5)
IMM GRANULOCYTES NFR BLD AUTO: 0.4 % (ref 0–0.5)
IMM GRANULOCYTES NFR BLD AUTO: 0.4 % (ref 0–0.5)
IMM GRANULOCYTES NFR BLD AUTO: 0.5 % (ref 0–0.5)
INHALED O2 CONCENTRATION: 100 %
INHALED O2 CONCENTRATION: 100 %
KETONES UR QL STRIP: NEGATIVE
LACTATE BLDA-SCNC: 15.98 MMOL/L (ref 0.5–2)
LACTATE BLDA-SCNC: 15.98 MMOL/L (ref 0.5–2)
LARGE PLATELETS: NORMAL
LDLC SERPL CALC-MCNC: 221 MG/DL (ref 0–100)
LDLC/HDLC SERPL: 5.64 {RATIO}
LEUKOCYTE ESTERASE UR QL STRIP.AUTO: NEGATIVE
LEVETIRACETAM SERPL-MCNC: 15.1 UG/ML (ref 10–40)
LIPASE SERPL-CCNC: 24 U/L (ref 13–60)
LIPASE SERPL-CCNC: 29 U/L (ref 13–60)
LYMPHOCYTES # BLD AUTO: 1.3 10*3/MM3 (ref 0.7–3.1)
LYMPHOCYTES # BLD AUTO: 1.42 10*3/MM3 (ref 0.7–3.1)
LYMPHOCYTES # BLD AUTO: 1.45 10*3/MM3 (ref 0.7–3.1)
LYMPHOCYTES # BLD AUTO: 1.61 10*3/MM3 (ref 0.7–3.1)
LYMPHOCYTES # BLD AUTO: 1.74 10*3/MM3 (ref 0.7–3.1)
LYMPHOCYTES NFR BLD AUTO: 11.1 % (ref 19.6–45.3)
LYMPHOCYTES NFR BLD AUTO: 17.8 % (ref 19.6–45.3)
LYMPHOCYTES NFR BLD AUTO: 18.4 % (ref 19.6–45.3)
LYMPHOCYTES NFR BLD AUTO: 18.5 % (ref 19.6–45.3)
LYMPHOCYTES NFR BLD AUTO: 20.7 % (ref 19.6–45.3)
MACROCYTES BLD QL SMEAR: NORMAL
MAGNESIUM SERPL-MCNC: 1.6 MG/DL (ref 1.6–2.6)
MAGNESIUM SERPL-MCNC: 2.1 MG/DL (ref 1.6–2.6)
MCH RBC QN AUTO: 30.8 PG (ref 26.6–33)
MCH RBC QN AUTO: 30.8 PG (ref 26.6–33)
MCH RBC QN AUTO: 30.9 PG (ref 26.6–33)
MCH RBC QN AUTO: 31 PG (ref 26.6–33)
MCH RBC QN AUTO: 31 PG (ref 26.6–33)
MCHC RBC AUTO-ENTMCNC: 33.1 G/DL (ref 31.5–35.7)
MCHC RBC AUTO-ENTMCNC: 33.2 G/DL (ref 31.5–35.7)
MCHC RBC AUTO-ENTMCNC: 33.3 G/DL (ref 31.5–35.7)
MCHC RBC AUTO-ENTMCNC: 33.7 G/DL (ref 31.5–35.7)
MCHC RBC AUTO-ENTMCNC: 34.5 G/DL (ref 31.5–35.7)
MCV RBC AUTO: 89.2 FL (ref 79–97)
MCV RBC AUTO: 91.4 FL (ref 79–97)
MCV RBC AUTO: 92.4 FL (ref 79–97)
MCV RBC AUTO: 93.3 FL (ref 79–97)
MCV RBC AUTO: 93.5 FL (ref 79–97)
METHGB BLD QL: 0.4 % (ref 0–1.5)
METHGB BLD QL: 0.4 % (ref 0–1.5)
MODALITY: ABNORMAL
MODALITY: ABNORMAL
MONOCYTES # BLD AUTO: 0.48 10*3/MM3 (ref 0.1–0.9)
MONOCYTES # BLD AUTO: 0.57 10*3/MM3 (ref 0.1–0.9)
MONOCYTES # BLD AUTO: 0.58 10*3/MM3 (ref 0.1–0.9)
MONOCYTES # BLD AUTO: 0.59 10*3/MM3 (ref 0.1–0.9)
MONOCYTES # BLD AUTO: 0.65 10*3/MM3 (ref 0.1–0.9)
MONOCYTES NFR BLD AUTO: 5 % (ref 5–12)
MONOCYTES NFR BLD AUTO: 6.1 % (ref 5–12)
MONOCYTES NFR BLD AUTO: 6.7 % (ref 5–12)
MONOCYTES NFR BLD AUTO: 7.3 % (ref 5–12)
MONOCYTES NFR BLD AUTO: 7.5 % (ref 5–12)
NEUTROPHILS NFR BLD AUTO: 5.24 10*3/MM3 (ref 1.7–7)
NEUTROPHILS NFR BLD AUTO: 5.4 10*3/MM3 (ref 1.7–7)
NEUTROPHILS NFR BLD AUTO: 5.76 10*3/MM3 (ref 1.7–7)
NEUTROPHILS NFR BLD AUTO: 67.5 % (ref 42.7–76)
NEUTROPHILS NFR BLD AUTO: 69.8 % (ref 42.7–76)
NEUTROPHILS NFR BLD AUTO: 7.01 10*3/MM3 (ref 1.7–7)
NEUTROPHILS NFR BLD AUTO: 71.8 % (ref 42.7–76)
NEUTROPHILS NFR BLD AUTO: 73.6 % (ref 42.7–76)
NEUTROPHILS NFR BLD AUTO: 81.5 % (ref 42.7–76)
NEUTROPHILS NFR BLD AUTO: 9.57 10*3/MM3 (ref 1.7–7)
NITRITE UR QL STRIP: NEGATIVE
NOTE: ABNORMAL
NOTE: ABNORMAL
NRBC BLD AUTO-RTO: 0 /100 WBC (ref 0–0.2)
NT-PROBNP SERPL-MCNC: 83.8 PG/ML (ref 0–900)
OXYHGB MFR BLDV: 94.6 % (ref 94–99)
OXYHGB MFR BLDV: 94.6 % (ref 94–99)
PCO2 BLDA: 103.5 MM HG (ref 35–45)
PCO2 BLDA: 103.5 MM HG (ref 35–45)
PH BLDA: 6.84 PH UNITS (ref 7.35–7.45)
PH BLDA: 6.84 PH UNITS (ref 7.35–7.45)
PH UR STRIP.AUTO: 7 [PH] (ref 5–8)
PLATELET # BLD AUTO: 165 10*3/MM3 (ref 140–450)
PLATELET # BLD AUTO: 234 10*3/MM3 (ref 140–450)
PLATELET # BLD AUTO: 245 10*3/MM3 (ref 140–450)
PLATELET # BLD AUTO: 257 10*3/MM3 (ref 140–450)
PLATELET # BLD AUTO: 350 10*3/MM3 (ref 140–450)
PMV BLD AUTO: 10.1 FL (ref 6–12)
PMV BLD AUTO: 10.1 FL (ref 6–12)
PMV BLD AUTO: 10.2 FL (ref 6–12)
PMV BLD AUTO: 10.7 FL (ref 6–12)
PMV BLD AUTO: 9.8 FL (ref 6–12)
PO2 BLD: 140 MM[HG] (ref 0–500)
PO2 BLD: 140 MM[HG] (ref 0–500)
PO2 BLDA: 139.6 MM HG (ref 80–100)
PO2 BLDA: 139.6 MM HG (ref 80–100)
POTASSIUM BLDA-SCNC: 3.19 MMOL/L (ref 3.5–5)
POTASSIUM BLDA-SCNC: 3.19 MMOL/L (ref 3.5–5)
POTASSIUM SERPL-SCNC: 3 MMOL/L (ref 3.5–5.2)
POTASSIUM SERPL-SCNC: 3 MMOL/L (ref 3.5–5.2)
POTASSIUM SERPL-SCNC: 3.2 MMOL/L (ref 3.5–5.2)
POTASSIUM SERPL-SCNC: 3.2 MMOL/L (ref 3.5–5.2)
POTASSIUM SERPL-SCNC: 3.4 MMOL/L (ref 3.5–5.2)
POTASSIUM SERPL-SCNC: 3.7 MMOL/L (ref 3.5–5.2)
POTASSIUM SERPL-SCNC: 3.8 MMOL/L (ref 3.5–5.2)
POTASSIUM SERPL-SCNC: 3.9 MMOL/L (ref 3.5–5.2)
PROCALCITONIN SERPL-MCNC: 0.09 NG/ML (ref 0–0.25)
PROT SERPL-MCNC: 5.1 G/DL (ref 6–8.5)
PROT SERPL-MCNC: 5.1 G/DL (ref 6–8.5)
PROT SERPL-MCNC: 6.6 G/DL (ref 6–8.5)
PROT SERPL-MCNC: 7.2 G/DL (ref 6–8.5)
PROT SERPL-MCNC: 7.6 G/DL (ref 6–8.5)
PROT SERPL-MCNC: 7.8 G/DL (ref 6–8.5)
PROT SERPL-MCNC: 7.8 G/DL (ref 6–8.5)
PROT SERPL-MCNC: 8.1 G/DL (ref 6–8.5)
PROT UR QL STRIP: ABNORMAL
QT INTERVAL: 396 MS
QT INTERVAL: 442 MS
QT INTERVAL: 446 MS
QT INTERVAL: 446 MS
RBC # BLD AUTO: 4.44 10*6/MM3 (ref 3.77–5.28)
RBC # BLD AUTO: 4.78 10*6/MM3 (ref 3.77–5.28)
RBC # BLD AUTO: 4.78 10*6/MM3 (ref 3.77–5.28)
RBC # BLD AUTO: 4.97 10*6/MM3 (ref 3.77–5.28)
RBC # BLD AUTO: 5.07 10*6/MM3 (ref 3.77–5.28)
RBC # UR STRIP: ABNORMAL /HPF
REF LAB TEST METHOD: ABNORMAL
RH BLD: POSITIVE
RH BLD: POSITIVE
SAO2 % BLDCOA: 95.7 % (ref 95–99)
SAO2 % BLDCOA: 95.7 % (ref 95–99)
SARS-COV-2 RNA PNL SPEC NAA+PROBE: NOT DETECTED
SMALL PLATELETS BLD QL SMEAR: ADEQUATE
SODIUM BLDA-SCNC: 143.8 MMOL/L (ref 136–146)
SODIUM BLDA-SCNC: 143.8 MMOL/L (ref 136–146)
SODIUM SERPL-SCNC: 140 MMOL/L (ref 136–145)
SODIUM SERPL-SCNC: 141 MMOL/L (ref 136–145)
SODIUM SERPL-SCNC: 142 MMOL/L (ref 136–145)
SODIUM SERPL-SCNC: 144 MMOL/L (ref 136–145)
SODIUM SERPL-SCNC: 145 MMOL/L (ref 136–145)
SODIUM SERPL-SCNC: 145 MMOL/L (ref 136–145)
SP GR UR STRIP: 1.01 (ref 1–1.03)
SQUAMOUS #/AREA URNS HPF: ABNORMAL /HPF
T&S EXPIRATION DATE: NORMAL
T&S EXPIRATION DATE: NORMAL
T4 FREE SERPL-MCNC: 1.12 NG/DL (ref 0.93–1.7)
TARGETS BLD QL SMEAR: NORMAL
TRIGL SERPL-MCNC: 196 MG/DL (ref 0–150)
TROPONIN T DELTA: -1 NG/L
TROPONIN T SERPL HS-MCNC: 10 NG/L
TROPONIN T SERPL HS-MCNC: 12 NG/L
TROPONIN T SERPL HS-MCNC: 15 NG/L
TSH SERPL DL<=0.05 MIU/L-ACNC: 2.4 UIU/ML (ref 0.27–4.2)
UROBILINOGEN UR QL STRIP: ABNORMAL
VLDLC SERPL-MCNC: 38 MG/DL (ref 5–40)
WBC # UR STRIP: ABNORMAL /HPF
WBC MORPH BLD: NORMAL
WBC NRBC COR # BLD: 11.74 10*3/MM3 (ref 3.4–10.8)
WBC NRBC COR # BLD: 7.73 10*3/MM3 (ref 3.4–10.8)
WBC NRBC COR # BLD: 7.76 10*3/MM3 (ref 3.4–10.8)
WBC NRBC COR # BLD: 7.83 10*3/MM3 (ref 3.4–10.8)
WBC NRBC COR # BLD: 9.76 10*3/MM3 (ref 3.4–10.8)
WHOLE BLOOD HOLD COAG: NORMAL
WHOLE BLOOD HOLD SPECIMEN: NORMAL

## 2023-01-01 PROCEDURE — G0378 HOSPITAL OBSERVATION PER HR: HCPCS

## 2023-01-01 PROCEDURE — U0004 COV-19 TEST NON-CDC HGH THRU: HCPCS | Performed by: PHYSICIAN ASSISTANT

## 2023-01-01 PROCEDURE — 74176 CT ABD & PELVIS W/O CONTRAST: CPT

## 2023-01-01 PROCEDURE — 36415 COLL VENOUS BLD VENIPUNCTURE: CPT | Performed by: PHYSICIAN ASSISTANT

## 2023-01-01 PROCEDURE — 84439 ASSAY OF FREE THYROXINE: CPT | Performed by: FAMILY MEDICINE

## 2023-01-01 PROCEDURE — 25010000002 AMIODARONE IN DEXTROSE 5% 360-4.14 MG/200ML-% SOLUTION: Performed by: STUDENT IN AN ORGANIZED HEALTH CARE EDUCATION/TRAINING PROGRAM

## 2023-01-01 PROCEDURE — 94760 N-INVAS EAR/PLS OXIMETRY 1: CPT

## 2023-01-01 PROCEDURE — 0 IOPAMIDOL PER 1 ML: Performed by: FAMILY MEDICINE

## 2023-01-01 PROCEDURE — 84484 ASSAY OF TROPONIN QUANT: CPT | Performed by: FAMILY MEDICINE

## 2023-01-01 PROCEDURE — 76999 ECHO EXAMINATION PROCEDURE: CPT

## 2023-01-01 PROCEDURE — 83605 ASSAY OF LACTIC ACID: CPT | Performed by: STUDENT IN AN ORGANIZED HEALTH CARE EDUCATION/TRAINING PROGRAM

## 2023-01-01 PROCEDURE — 25010000002 FLUCONAZOLE PER 200 MG: Performed by: HOSPITALIST

## 2023-01-01 PROCEDURE — 83036 HEMOGLOBIN GLYCOSYLATED A1C: CPT | Performed by: INTERNAL MEDICINE

## 2023-01-01 PROCEDURE — 83735 ASSAY OF MAGNESIUM: CPT | Performed by: EMERGENCY MEDICINE

## 2023-01-01 PROCEDURE — 93005 ELECTROCARDIOGRAM TRACING: CPT | Performed by: STUDENT IN AN ORGANIZED HEALTH CARE EDUCATION/TRAINING PROGRAM

## 2023-01-01 PROCEDURE — 83605 ASSAY OF LACTIC ACID: CPT | Performed by: REGISTERED NURSE

## 2023-01-01 PROCEDURE — 80053 COMPREHEN METABOLIC PANEL: CPT | Performed by: INTERNAL MEDICINE

## 2023-01-01 PROCEDURE — 99214 OFFICE O/P EST MOD 30 MIN: CPT | Performed by: FAMILY MEDICINE

## 2023-01-01 PROCEDURE — 93005 ELECTROCARDIOGRAM TRACING: CPT

## 2023-01-01 PROCEDURE — 80053 COMPREHEN METABOLIC PANEL: CPT | Performed by: STUDENT IN AN ORGANIZED HEALTH CARE EDUCATION/TRAINING PROGRAM

## 2023-01-01 PROCEDURE — 94799 UNLISTED PULMONARY SVC/PX: CPT

## 2023-01-01 PROCEDURE — 5A12012 PERFORMANCE OF CARDIAC OUTPUT, SINGLE, MANUAL: ICD-10-PCS | Performed by: STUDENT IN AN ORGANIZED HEALTH CARE EDUCATION/TRAINING PROGRAM

## 2023-01-01 PROCEDURE — 80177 DRUG SCRN QUAN LEVETIRACETAM: CPT | Performed by: PHYSICIAN ASSISTANT

## 2023-01-01 PROCEDURE — 36415 COLL VENOUS BLD VENIPUNCTURE: CPT

## 2023-01-01 PROCEDURE — 71045 X-RAY EXAM CHEST 1 VIEW: CPT

## 2023-01-01 PROCEDURE — 83690 ASSAY OF LIPASE: CPT | Performed by: EMERGENCY MEDICINE

## 2023-01-01 PROCEDURE — 82375 ASSAY CARBOXYHB QUANT: CPT | Performed by: STUDENT IN AN ORGANIZED HEALTH CARE EDUCATION/TRAINING PROGRAM

## 2023-01-01 PROCEDURE — 99495 TRANSJ CARE MGMT MOD F2F 14D: CPT | Performed by: FAMILY MEDICINE

## 2023-01-01 PROCEDURE — 99284 EMERGENCY DEPT VISIT MOD MDM: CPT

## 2023-01-01 PROCEDURE — 82043 UR ALBUMIN QUANTITATIVE: CPT | Performed by: FAMILY MEDICINE

## 2023-01-01 PROCEDURE — 25010000002 HYDROMORPHONE 1 MG/ML SOLUTION: Performed by: INTERNAL MEDICINE

## 2023-01-01 PROCEDURE — 80053 COMPREHEN METABOLIC PANEL: CPT | Performed by: FAMILY MEDICINE

## 2023-01-01 PROCEDURE — 85025 COMPLETE CBC W/AUTO DIFF WBC: CPT | Performed by: INTERNAL MEDICINE

## 2023-01-01 PROCEDURE — U0005 INFEC AGEN DETEC AMPLI PROBE: HCPCS | Performed by: PHYSICIAN ASSISTANT

## 2023-01-01 PROCEDURE — 85007 BL SMEAR W/DIFF WBC COUNT: CPT | Performed by: REGISTERED NURSE

## 2023-01-01 PROCEDURE — 86901 BLOOD TYPING SEROLOGIC RH(D): CPT | Performed by: STUDENT IN AN ORGANIZED HEALTH CARE EDUCATION/TRAINING PROGRAM

## 2023-01-01 PROCEDURE — 86850 RBC ANTIBODY SCREEN: CPT | Performed by: STUDENT IN AN ORGANIZED HEALTH CARE EDUCATION/TRAINING PROGRAM

## 2023-01-01 PROCEDURE — 83050 HGB METHEMOGLOBIN QUAN: CPT | Performed by: STUDENT IN AN ORGANIZED HEALTH CARE EDUCATION/TRAINING PROGRAM

## 2023-01-01 PROCEDURE — 36415 COLL VENOUS BLD VENIPUNCTURE: CPT | Performed by: FAMILY MEDICINE

## 2023-01-01 PROCEDURE — 99213 OFFICE O/P EST LOW 20 MIN: CPT | Performed by: SURGERY

## 2023-01-01 PROCEDURE — 99233 SBSQ HOSP IP/OBS HIGH 50: CPT | Performed by: INTERNAL MEDICINE

## 2023-01-01 PROCEDURE — 82805 BLOOD GASES W/O2 SATURATION: CPT | Performed by: STUDENT IN AN ORGANIZED HEALTH CARE EDUCATION/TRAINING PROGRAM

## 2023-01-01 PROCEDURE — 99212 OFFICE O/P EST SF 10 MIN: CPT | Performed by: NURSE PRACTITIONER

## 2023-01-01 PROCEDURE — 25010000002 AMIODARONE PER 30 MG: Performed by: STUDENT IN AN ORGANIZED HEALTH CARE EDUCATION/TRAINING PROGRAM

## 2023-01-01 PROCEDURE — 63710000001 INSULIN LISPRO (HUMAN) PER 5 UNITS: Performed by: INTERNAL MEDICINE

## 2023-01-01 PROCEDURE — 93005 ELECTROCARDIOGRAM TRACING: CPT | Performed by: EMERGENCY MEDICINE

## 2023-01-01 PROCEDURE — 80053 COMPREHEN METABOLIC PANEL: CPT | Performed by: REGISTERED NURSE

## 2023-01-01 PROCEDURE — 25010000002 HYDROMORPHONE 1 MG/ML SOLUTION: Performed by: EMERGENCY MEDICINE

## 2023-01-01 PROCEDURE — 99213 OFFICE O/P EST LOW 20 MIN: CPT | Performed by: NURSE PRACTITIONER

## 2023-01-01 PROCEDURE — 87804 INFLUENZA ASSAY W/OPTIC: CPT | Performed by: PHYSICIAN ASSISTANT

## 2023-01-01 PROCEDURE — 25010000002 KETOROLAC TROMETHAMINE PER 15 MG: Performed by: PHYSICIAN ASSISTANT

## 2023-01-01 PROCEDURE — 83690 ASSAY OF LIPASE: CPT | Performed by: REGISTERED NURSE

## 2023-01-01 PROCEDURE — 25010000002 DIPHENHYDRAMINE PER 50 MG: Performed by: HOSPITALIST

## 2023-01-01 PROCEDURE — 92950 HEART/LUNG RESUSCITATION CPR: CPT

## 2023-01-01 PROCEDURE — 25010000002 ENOXAPARIN PER 10 MG: Performed by: INTERNAL MEDICINE

## 2023-01-01 PROCEDURE — 85025 COMPLETE CBC W/AUTO DIFF WBC: CPT

## 2023-01-01 PROCEDURE — 86900 BLOOD TYPING SEROLOGIC ABO: CPT | Performed by: STUDENT IN AN ORGANIZED HEALTH CARE EDUCATION/TRAINING PROGRAM

## 2023-01-01 PROCEDURE — 85025 COMPLETE CBC W/AUTO DIFF WBC: CPT | Performed by: FAMILY MEDICINE

## 2023-01-01 PROCEDURE — 93000 ELECTROCARDIOGRAM COMPLETE: CPT | Performed by: FAMILY MEDICINE

## 2023-01-01 PROCEDURE — 99221 1ST HOSP IP/OBS SF/LOW 40: CPT | Performed by: UROLOGY

## 2023-01-01 PROCEDURE — 84484 ASSAY OF TROPONIN QUANT: CPT

## 2023-01-01 PROCEDURE — 87040 BLOOD CULTURE FOR BACTERIA: CPT | Performed by: PHYSICIAN ASSISTANT

## 2023-01-01 PROCEDURE — 80053 COMPREHEN METABOLIC PANEL: CPT | Performed by: EMERGENCY MEDICINE

## 2023-01-01 PROCEDURE — 83880 ASSAY OF NATRIURETIC PEPTIDE: CPT | Performed by: EMERGENCY MEDICINE

## 2023-01-01 PROCEDURE — 81001 URINALYSIS AUTO W/SCOPE: CPT | Performed by: REGISTERED NURSE

## 2023-01-01 PROCEDURE — 82962 GLUCOSE BLOOD TEST: CPT

## 2023-01-01 PROCEDURE — 99222 1ST HOSP IP/OBS MODERATE 55: CPT | Performed by: INTERNAL MEDICINE

## 2023-01-01 PROCEDURE — 25010000002 EPINEPHRINE 1 MG/10ML SOLUTION PREFILLED SYRINGE: Performed by: STUDENT IN AN ORGANIZED HEALTH CARE EDUCATION/TRAINING PROGRAM

## 2023-01-01 PROCEDURE — 84443 ASSAY THYROID STIM HORMONE: CPT | Performed by: FAMILY MEDICINE

## 2023-01-01 PROCEDURE — 84484 ASSAY OF TROPONIN QUANT: CPT | Performed by: EMERGENCY MEDICINE

## 2023-01-01 PROCEDURE — 99285 EMERGENCY DEPT VISIT HI MDM: CPT

## 2023-01-01 PROCEDURE — 99239 HOSP IP/OBS DSCHRG MGMT >30: CPT | Performed by: INTERNAL MEDICINE

## 2023-01-01 PROCEDURE — 71260 CT THORAX DX C+: CPT

## 2023-01-01 PROCEDURE — 63710000001 ONDANSETRON ODT 4 MG TABLET DISPERSIBLE: Performed by: EMERGENCY MEDICINE

## 2023-01-01 PROCEDURE — 80061 LIPID PANEL: CPT | Performed by: FAMILY MEDICINE

## 2023-01-01 PROCEDURE — 83605 ASSAY OF LACTIC ACID: CPT | Performed by: INTERNAL MEDICINE

## 2023-01-01 PROCEDURE — 84145 PROCALCITONIN (PCT): CPT | Performed by: INTERNAL MEDICINE

## 2023-01-01 PROCEDURE — 85025 COMPLETE CBC W/AUTO DIFF WBC: CPT | Performed by: REGISTERED NURSE

## 2023-01-01 PROCEDURE — 83735 ASSAY OF MAGNESIUM: CPT | Performed by: PHYSICIAN ASSISTANT

## 2023-01-01 RX ORDER — NICOTINE POLACRILEX 4 MG
15 LOZENGE BUCCAL
Status: DISCONTINUED | OUTPATIENT
Start: 2023-01-01 | End: 2023-01-01 | Stop reason: HOSPADM

## 2023-01-01 RX ORDER — DAPAGLIFLOZIN 10 MG/1
10 TABLET, FILM COATED ORAL DAILY
Qty: 90 TABLET | Refills: 1 | Status: SHIPPED | OUTPATIENT
Start: 2023-01-01

## 2023-01-01 RX ORDER — POTASSIUM CHLORIDE 750 MG/1
40 CAPSULE, EXTENDED RELEASE ORAL ONCE
Status: COMPLETED | OUTPATIENT
Start: 2023-01-01 | End: 2023-01-01

## 2023-01-01 RX ORDER — ONDANSETRON 2 MG/ML
4 INJECTION INTRAMUSCULAR; INTRAVENOUS EVERY 6 HOURS PRN
Status: DISCONTINUED | OUTPATIENT
Start: 2023-01-01 | End: 2023-01-01 | Stop reason: HOSPADM

## 2023-01-01 RX ORDER — SODIUM CHLORIDE 9 MG/ML
40 INJECTION, SOLUTION INTRAVENOUS AS NEEDED
Status: DISCONTINUED | OUTPATIENT
Start: 2023-01-01 | End: 2023-01-01 | Stop reason: HOSPADM

## 2023-01-01 RX ORDER — ALPRAZOLAM 1 MG/1
2 TABLET ORAL 3 TIMES DAILY PRN
Status: DISCONTINUED | OUTPATIENT
Start: 2023-01-01 | End: 2023-01-01 | Stop reason: HOSPADM

## 2023-01-01 RX ORDER — ACETAMINOPHEN 325 MG/1
650 TABLET ORAL EVERY 4 HOURS PRN
Status: DISCONTINUED | OUTPATIENT
Start: 2023-01-01 | End: 2023-01-01 | Stop reason: HOSPADM

## 2023-01-01 RX ORDER — DEXTROSE MONOHYDRATE 25 G/50ML
25 INJECTION, SOLUTION INTRAVENOUS
Status: DISCONTINUED | OUTPATIENT
Start: 2023-01-01 | End: 2023-01-01 | Stop reason: HOSPADM

## 2023-01-01 RX ORDER — LEVETIRACETAM 750 MG/1
750 TABLET ORAL 2 TIMES DAILY
Status: DISCONTINUED | OUTPATIENT
Start: 2023-01-01 | End: 2023-01-01 | Stop reason: HOSPADM

## 2023-01-01 RX ORDER — NICOTINE POLACRILEX 4 MG
15 LOZENGE BUCCAL
Status: DISCONTINUED | OUTPATIENT
Start: 2023-01-01 | End: 2023-01-01

## 2023-01-01 RX ORDER — HYDROCODONE BITARTRATE AND ACETAMINOPHEN 5; 325 MG/1; MG/1
1 TABLET ORAL EVERY 8 HOURS PRN
Qty: 21 TABLET | Refills: 0 | Status: SHIPPED | OUTPATIENT
Start: 2023-01-01

## 2023-01-01 RX ORDER — KETOROLAC TROMETHAMINE 15 MG/ML
15 INJECTION, SOLUTION INTRAMUSCULAR; INTRAVENOUS ONCE
Status: COMPLETED | OUTPATIENT
Start: 2023-01-01 | End: 2023-01-01

## 2023-01-01 RX ORDER — ONDANSETRON 4 MG/1
4 TABLET, ORALLY DISINTEGRATING ORAL ONCE
Status: COMPLETED | OUTPATIENT
Start: 2023-01-01 | End: 2023-01-01

## 2023-01-01 RX ORDER — METFORMIN HYDROCHLORIDE 500 MG/1
500 TABLET, EXTENDED RELEASE ORAL
Qty: 90 TABLET | Refills: 1 | Status: SHIPPED | OUTPATIENT
Start: 2023-01-01

## 2023-01-01 RX ORDER — NOREPINEPHRINE BIT/0.9 % NACL 8 MG/250ML
INFUSION BOTTLE (ML) INTRAVENOUS
Status: DISCONTINUED
Start: 2023-01-01 | End: 2023-01-01 | Stop reason: HOSPADM

## 2023-01-01 RX ORDER — AMIODARONE HYDROCHLORIDE 50 MG/ML
INJECTION, SOLUTION INTRAVENOUS
Status: COMPLETED | OUTPATIENT
Start: 2023-01-01 | End: 2023-01-01

## 2023-01-01 RX ORDER — INSULIN LISPRO 100 [IU]/ML
2-7 INJECTION, SOLUTION INTRAVENOUS; SUBCUTANEOUS
Status: DISCONTINUED | OUTPATIENT
Start: 2023-01-01 | End: 2023-01-01 | Stop reason: HOSPADM

## 2023-01-01 RX ORDER — BISACODYL 5 MG/1
5 TABLET, DELAYED RELEASE ORAL DAILY PRN
Status: DISCONTINUED | OUTPATIENT
Start: 2023-01-01 | End: 2023-01-01 | Stop reason: HOSPADM

## 2023-01-01 RX ORDER — ENOXAPARIN SODIUM 100 MG/ML
40 INJECTION SUBCUTANEOUS DAILY
Status: DISCONTINUED | OUTPATIENT
Start: 2023-01-01 | End: 2023-01-01 | Stop reason: HOSPADM

## 2023-01-01 RX ORDER — HYDROCHLOROTHIAZIDE 25 MG/1
TABLET ORAL
Qty: 90 TABLET | Refills: 0 | Status: SHIPPED | OUTPATIENT
Start: 2023-01-01

## 2023-01-01 RX ORDER — SODIUM CHLORIDE 0.9 % (FLUSH) 0.9 %
10 SYRINGE (ML) INJECTION AS NEEDED
Status: DISCONTINUED | OUTPATIENT
Start: 2023-01-01 | End: 2023-01-01 | Stop reason: HOSPADM

## 2023-01-01 RX ORDER — OXYCODONE HYDROCHLORIDE 5 MG/1
10 TABLET ORAL EVERY 8 HOURS PRN
Status: DISCONTINUED | OUTPATIENT
Start: 2023-01-01 | End: 2023-01-01 | Stop reason: HOSPADM

## 2023-01-01 RX ORDER — DIPHENHYDRAMINE HYDROCHLORIDE 50 MG/ML
25 INJECTION INTRAMUSCULAR; INTRAVENOUS EVERY 6 HOURS PRN
Status: DISCONTINUED | OUTPATIENT
Start: 2023-01-01 | End: 2023-01-01 | Stop reason: HOSPADM

## 2023-01-01 RX ORDER — BISACODYL 10 MG
10 SUPPOSITORY, RECTAL RECTAL DAILY PRN
Status: DISCONTINUED | OUTPATIENT
Start: 2023-01-01 | End: 2023-01-01 | Stop reason: HOSPADM

## 2023-01-01 RX ORDER — SODIUM CHLORIDE 0.9 % (FLUSH) 0.9 %
10 SYRINGE (ML) INJECTION EVERY 12 HOURS SCHEDULED
Status: DISCONTINUED | OUTPATIENT
Start: 2023-01-01 | End: 2023-01-01 | Stop reason: HOSPADM

## 2023-01-01 RX ORDER — OXYCODONE HYDROCHLORIDE 5 MG/1
5 TABLET ORAL EVERY 8 HOURS PRN
Status: DISCONTINUED | OUTPATIENT
Start: 2023-01-01 | End: 2023-01-01 | Stop reason: HOSPADM

## 2023-01-01 RX ORDER — FLUCONAZOLE 2 MG/ML
200 INJECTION, SOLUTION INTRAVENOUS EVERY 24 HOURS
Status: DISCONTINUED | OUTPATIENT
Start: 2023-01-01 | End: 2023-01-01 | Stop reason: HOSPADM

## 2023-01-01 RX ORDER — AMOXICILLIN 250 MG
2 CAPSULE ORAL 2 TIMES DAILY
Status: DISCONTINUED | OUTPATIENT
Start: 2023-01-01 | End: 2023-01-01 | Stop reason: HOSPADM

## 2023-01-01 RX ORDER — SODIUM CHLORIDE, SODIUM LACTATE, POTASSIUM CHLORIDE, CALCIUM CHLORIDE 600; 310; 30; 20 MG/100ML; MG/100ML; MG/100ML; MG/100ML
100 INJECTION, SOLUTION INTRAVENOUS CONTINUOUS
Status: DISCONTINUED | OUTPATIENT
Start: 2023-01-01 | End: 2023-01-01 | Stop reason: HOSPADM

## 2023-01-01 RX ORDER — ASPIRIN 81 MG/1
324 TABLET, CHEWABLE ORAL ONCE
Status: DISCONTINUED | OUTPATIENT
Start: 2023-01-01 | End: 2023-01-01 | Stop reason: HOSPADM

## 2023-01-01 RX ORDER — AMITRIPTYLINE HYDROCHLORIDE 25 MG/1
25 TABLET, FILM COATED ORAL NIGHTLY
Status: DISCONTINUED | OUTPATIENT
Start: 2023-01-01 | End: 2023-01-01 | Stop reason: HOSPADM

## 2023-01-01 RX ORDER — LEVOTHYROXINE SODIUM 0.05 MG/1
TABLET ORAL
Qty: 90 TABLET | Refills: 1 | Status: SHIPPED | OUTPATIENT
Start: 2023-01-01

## 2023-01-01 RX ORDER — DULOXETIN HYDROCHLORIDE 30 MG/1
60 CAPSULE, DELAYED RELEASE ORAL 2 TIMES DAILY
Status: DISCONTINUED | OUTPATIENT
Start: 2023-01-01 | End: 2023-01-01 | Stop reason: HOSPADM

## 2023-01-01 RX ORDER — NALOXONE HCL 0.4 MG/ML
0.4 VIAL (ML) INJECTION
Status: DISCONTINUED | OUTPATIENT
Start: 2023-01-01 | End: 2023-01-01 | Stop reason: HOSPADM

## 2023-01-01 RX ORDER — ACETAMINOPHEN 650 MG/1
650 SUPPOSITORY RECTAL EVERY 4 HOURS PRN
Status: DISCONTINUED | OUTPATIENT
Start: 2023-01-01 | End: 2023-01-01 | Stop reason: HOSPADM

## 2023-01-01 RX ORDER — POLYETHYLENE GLYCOL 3350 17 G/17G
17 POWDER, FOR SOLUTION ORAL DAILY
Status: DISCONTINUED | OUTPATIENT
Start: 2023-01-01 | End: 2023-01-01 | Stop reason: HOSPADM

## 2023-01-01 RX ORDER — EPINEPHRINE 0.1 MG/ML
SYRINGE (ML) INJECTION
Status: COMPLETED | OUTPATIENT
Start: 2023-01-01 | End: 2023-01-01

## 2023-01-01 RX ORDER — ACETAMINOPHEN 160 MG/5ML
650 SOLUTION ORAL EVERY 4 HOURS PRN
Status: DISCONTINUED | OUTPATIENT
Start: 2023-01-01 | End: 2023-01-01 | Stop reason: HOSPADM

## 2023-01-01 RX ORDER — DEXTROSE MONOHYDRATE 25 G/50ML
25 INJECTION, SOLUTION INTRAVENOUS
Status: DISCONTINUED | OUTPATIENT
Start: 2023-01-01 | End: 2023-01-01

## 2023-01-01 RX ORDER — IPRATROPIUM BROMIDE AND ALBUTEROL SULFATE 2.5; .5 MG/3ML; MG/3ML
3 SOLUTION RESPIRATORY (INHALATION) EVERY 4 HOURS PRN
Status: DISCONTINUED | OUTPATIENT
Start: 2023-01-01 | End: 2023-01-01

## 2023-01-01 RX ORDER — PANTOPRAZOLE SODIUM 40 MG/1
40 TABLET, DELAYED RELEASE ORAL DAILY
Qty: 90 TABLET | OUTPATIENT
Start: 2023-01-01

## 2023-01-01 RX ORDER — LEVOTHYROXINE SODIUM 0.05 MG/1
50 TABLET ORAL
Status: DISCONTINUED | OUTPATIENT
Start: 2023-01-01 | End: 2023-01-01 | Stop reason: HOSPADM

## 2023-01-01 RX ORDER — PANTOPRAZOLE SODIUM 40 MG/1
40 TABLET, DELAYED RELEASE ORAL DAILY
Qty: 30 TABLET | Refills: 1 | Status: SHIPPED | OUTPATIENT
Start: 2023-01-01

## 2023-01-01 RX ORDER — KETOROLAC TROMETHAMINE 15 MG/ML
15 INJECTION, SOLUTION INTRAMUSCULAR; INTRAVENOUS EVERY 6 HOURS PRN
Status: DISCONTINUED | OUTPATIENT
Start: 2023-01-01 | End: 2023-01-01 | Stop reason: HOSPADM

## 2023-01-01 RX ORDER — LISINOPRIL 20 MG/1
20 TABLET ORAL DAILY
Status: DISCONTINUED | OUTPATIENT
Start: 2023-01-01 | End: 2023-01-01 | Stop reason: HOSPADM

## 2023-01-01 RX ORDER — SODIUM CHLORIDE 9 MG/ML
INJECTION, SOLUTION INTRAVENOUS
Status: COMPLETED | OUTPATIENT
Start: 2023-01-01 | End: 2023-01-01

## 2023-01-01 RX ORDER — PANTOPRAZOLE SODIUM 40 MG/1
40 TABLET, DELAYED RELEASE ORAL EVERY MORNING
Status: DISCONTINUED | OUTPATIENT
Start: 2023-01-01 | End: 2023-01-01 | Stop reason: HOSPADM

## 2023-01-01 RX ORDER — NOREPINEPHRINE BIT/0.9 % NACL 8 MG/250ML
INFUSION BOTTLE (ML) INTRAVENOUS
Status: COMPLETED | OUTPATIENT
Start: 2023-01-01 | End: 2023-01-01

## 2023-01-01 RX ORDER — ALBUTEROL SULFATE 90 UG/1
2 AEROSOL, METERED RESPIRATORY (INHALATION) EVERY 4 HOURS PRN
Status: DISCONTINUED | OUTPATIENT
Start: 2023-01-01 | End: 2023-01-01 | Stop reason: HOSPADM

## 2023-01-01 RX ADMIN — LISINOPRIL 20 MG: 20 TABLET ORAL at 09:49

## 2023-01-01 RX ADMIN — HYDROMORPHONE HYDROCHLORIDE 1 MG: 1 INJECTION, SOLUTION INTRAMUSCULAR; INTRAVENOUS; SUBCUTANEOUS at 17:20

## 2023-01-01 RX ADMIN — INSULIN LISPRO 2 UNITS: 100 INJECTION, SOLUTION INTRAVENOUS; SUBCUTANEOUS at 09:49

## 2023-01-01 RX ADMIN — EPINEPHRINE 1 MG: 0.1 INJECTION INTRACARDIAC; INTRAVENOUS at 02:55

## 2023-01-01 RX ADMIN — HYDROMORPHONE HYDROCHLORIDE 0.5 MG: 1 INJECTION, SOLUTION INTRAMUSCULAR; INTRAVENOUS; SUBCUTANEOUS at 22:52

## 2023-01-01 RX ADMIN — ALPRAZOLAM 2 MG: 1 TABLET ORAL at 09:49

## 2023-01-01 RX ADMIN — INSULIN LISPRO 4 UNITS: 100 INJECTION, SOLUTION INTRAVENOUS; SUBCUTANEOUS at 09:38

## 2023-01-01 RX ADMIN — LISINOPRIL 20 MG: 20 TABLET ORAL at 09:38

## 2023-01-01 RX ADMIN — SODIUM BICARBONATE 50 MEQ: 84 INJECTION INTRAVENOUS at 02:41

## 2023-01-01 RX ADMIN — HYDROMORPHONE HYDROCHLORIDE 0.5 MG: 1 INJECTION, SOLUTION INTRAMUSCULAR; INTRAVENOUS; SUBCUTANEOUS at 08:22

## 2023-01-01 RX ADMIN — FLUCONAZOLE IN SODIUM CHLORIDE 200 MG: 2 INJECTION, SOLUTION INTRAVENOUS at 04:35

## 2023-01-01 RX ADMIN — DULOXETINE HYDROCHLORIDE 60 MG: 30 CAPSULE, DELAYED RELEASE ORAL at 10:18

## 2023-01-01 RX ADMIN — SODIUM CHLORIDE 1000 ML: 9 INJECTION, SOLUTION INTRAVENOUS at 17:22

## 2023-01-01 RX ADMIN — HYDROMORPHONE HYDROCHLORIDE 0.5 MG: 1 INJECTION, SOLUTION INTRAMUSCULAR; INTRAVENOUS; SUBCUTANEOUS at 15:25

## 2023-01-01 RX ADMIN — Medication 10 ML: at 01:32

## 2023-01-01 RX ADMIN — PANTOPRAZOLE SODIUM 40 MG: 40 TABLET, DELAYED RELEASE ORAL at 07:49

## 2023-01-01 RX ADMIN — POLYETHYLENE GLYCOL 3350 17 G: 17 POWDER, FOR SOLUTION ORAL at 09:38

## 2023-01-01 RX ADMIN — POLYETHYLENE GLYCOL 3350 17 G: 17 POWDER, FOR SOLUTION ORAL at 09:49

## 2023-01-01 RX ADMIN — SODIUM CHLORIDE, POTASSIUM CHLORIDE, SODIUM LACTATE AND CALCIUM CHLORIDE 1000 ML: 600; 310; 30; 20 INJECTION, SOLUTION INTRAVENOUS at 21:19

## 2023-01-01 RX ADMIN — LEVOTHYROXINE SODIUM 50 MCG: 50 TABLET ORAL at 10:18

## 2023-01-01 RX ADMIN — PANTOPRAZOLE SODIUM 40 MG: 40 TABLET, DELAYED RELEASE ORAL at 09:49

## 2023-01-01 RX ADMIN — ALPRAZOLAM 2 MG: 1 TABLET ORAL at 16:40

## 2023-01-01 RX ADMIN — INSULIN LISPRO 2 UNITS: 100 INJECTION, SOLUTION INTRAVENOUS; SUBCUTANEOUS at 17:25

## 2023-01-01 RX ADMIN — EPINEPHRINE 1 MG: 0.1 INJECTION INTRACARDIAC; INTRAVENOUS at 02:34

## 2023-01-01 RX ADMIN — SENNOSIDES AND DOCUSATE SODIUM 2 TABLET: 8.6; 5 TABLET ORAL at 09:38

## 2023-01-01 RX ADMIN — LEVETIRACETAM 750 MG: 750 TABLET, FILM COATED ORAL at 09:39

## 2023-01-01 RX ADMIN — ALPRAZOLAM 2 MG: 1 TABLET ORAL at 03:31

## 2023-01-01 RX ADMIN — DIPHENHYDRAMINE HYDROCHLORIDE 25 MG: 50 INJECTION, SOLUTION INTRAMUSCULAR; INTRAVENOUS at 03:32

## 2023-01-01 RX ADMIN — ONDANSETRON 4 MG: 4 TABLET, ORALLY DISINTEGRATING ORAL at 17:20

## 2023-01-01 RX ADMIN — Medication 10 ML: at 09:38

## 2023-01-01 RX ADMIN — AMIODARONE HYDROCHLORIDE 1 MG/MIN: 1.8 INJECTION, SOLUTION INTRAVENOUS at 03:18

## 2023-01-01 RX ADMIN — Medication 10 ML: at 09:50

## 2023-01-01 RX ADMIN — INSULIN LISPRO 3 UNITS: 100 INJECTION, SOLUTION INTRAVENOUS; SUBCUTANEOUS at 13:45

## 2023-01-01 RX ADMIN — AMITRIPTYLINE HYDROCHLORIDE 25 MG: 25 TABLET, FILM COATED ORAL at 00:25

## 2023-01-01 RX ADMIN — DULOXETINE HYDROCHLORIDE 60 MG: 30 CAPSULE, DELAYED RELEASE ORAL at 09:39

## 2023-01-01 RX ADMIN — POTASSIUM CHLORIDE 40 MEQ: 10 CAPSULE, COATED, EXTENDED RELEASE ORAL at 18:48

## 2023-01-01 RX ADMIN — EPINEPHRINE 1 MG: 0.1 INJECTION INTRACARDIAC; INTRAVENOUS at 02:31

## 2023-01-01 RX ADMIN — LEVETIRACETAM 750 MG: 750 TABLET, FILM COATED ORAL at 10:17

## 2023-01-01 RX ADMIN — HYDROMORPHONE HYDROCHLORIDE 0.5 MG: 1 INJECTION, SOLUTION INTRAMUSCULAR; INTRAVENOUS; SUBCUTANEOUS at 23:44

## 2023-01-01 RX ADMIN — POTASSIUM CHLORIDE 40 MEQ: 10 CAPSULE, COATED, EXTENDED RELEASE ORAL at 09:49

## 2023-01-01 RX ADMIN — IOPAMIDOL 100 ML: 755 INJECTION, SOLUTION INTRAVENOUS at 15:44

## 2023-01-01 RX ADMIN — DULOXETINE HYDROCHLORIDE 60 MG: 30 CAPSULE, DELAYED RELEASE ORAL at 21:49

## 2023-01-01 RX ADMIN — HYDROMORPHONE HYDROCHLORIDE 0.5 MG: 1 INJECTION, SOLUTION INTRAMUSCULAR; INTRAVENOUS; SUBCUTANEOUS at 21:48

## 2023-01-01 RX ADMIN — HYDROMORPHONE HYDROCHLORIDE 0.5 MG: 1 INJECTION, SOLUTION INTRAMUSCULAR; INTRAVENOUS; SUBCUTANEOUS at 01:31

## 2023-01-01 RX ADMIN — ENOXAPARIN SODIUM 40 MG: 100 INJECTION SUBCUTANEOUS at 08:22

## 2023-01-01 RX ADMIN — ENOXAPARIN SODIUM 40 MG: 100 INJECTION SUBCUTANEOUS at 09:38

## 2023-01-01 RX ADMIN — ACETAMINOPHEN 650 MG: 325 TABLET ORAL at 03:29

## 2023-01-01 RX ADMIN — Medication 0.01 MCG/KG/MIN: at 03:04

## 2023-01-01 RX ADMIN — SODIUM CHLORIDE, POTASSIUM CHLORIDE, SODIUM LACTATE AND CALCIUM CHLORIDE 100 ML/HR: 600; 310; 30; 20 INJECTION, SOLUTION INTRAVENOUS at 21:47

## 2023-01-01 RX ADMIN — AMIODARONE HYDROCHLORIDE 300 MG: 50 INJECTION, SOLUTION INTRAVENOUS at 02:39

## 2023-01-01 RX ADMIN — HYDROMORPHONE HYDROCHLORIDE 0.5 MG: 1 INJECTION, SOLUTION INTRAMUSCULAR; INTRAVENOUS; SUBCUTANEOUS at 16:40

## 2023-01-01 RX ADMIN — SODIUM CHLORIDE 1000 ML: 9 INJECTION, SOLUTION INTRAVENOUS at 03:05

## 2023-01-01 RX ADMIN — ALPRAZOLAM 2 MG: 1 TABLET ORAL at 21:49

## 2023-01-01 RX ADMIN — Medication 10 ML: at 00:32

## 2023-01-01 RX ADMIN — Medication 10 ML: at 21:48

## 2023-01-01 RX ADMIN — SODIUM CHLORIDE, POTASSIUM CHLORIDE, SODIUM LACTATE AND CALCIUM CHLORIDE 100 ML/HR: 600; 310; 30; 20 INJECTION, SOLUTION INTRAVENOUS at 07:48

## 2023-01-01 RX ADMIN — HYDROMORPHONE HYDROCHLORIDE 0.5 MG: 1 INJECTION, SOLUTION INTRAMUSCULAR; INTRAVENOUS; SUBCUTANEOUS at 03:35

## 2023-01-01 RX ADMIN — LEVOTHYROXINE SODIUM 50 MCG: 50 TABLET ORAL at 07:49

## 2023-01-01 RX ADMIN — LEVETIRACETAM 750 MG: 750 TABLET, FILM COATED ORAL at 21:49

## 2023-01-01 RX ADMIN — KETOROLAC TROMETHAMINE 15 MG: 15 INJECTION, SOLUTION INTRAMUSCULAR; INTRAVENOUS at 19:42

## 2023-01-01 RX ADMIN — SODIUM CHLORIDE, POTASSIUM CHLORIDE, SODIUM LACTATE AND CALCIUM CHLORIDE 100 ML/HR: 600; 310; 30; 20 INJECTION, SOLUTION INTRAVENOUS at 01:32

## 2023-01-01 RX ADMIN — HYDROMORPHONE HYDROCHLORIDE 0.5 MG: 1 INJECTION, SOLUTION INTRAMUSCULAR; INTRAVENOUS; SUBCUTANEOUS at 05:43

## 2023-01-01 NOTE — ANESTHESIA POSTPROCEDURE EVALUATION
Patient: Lidia Yeung    Procedure Summary     Date: 09/13/22 Room / Location: [facility] OR  / [facility]    Anesthesia Start: 1340 Anesthesia Stop: 1634    Procedures:       BREAST MASTECTOMY BILATERAL (Bilateral Breast)      Bilateral breast reconstruction with placement of implants and mesh, use of spy (Bilateral Breast) Diagnosis:       BRCA gene mutation positive      (BRCA gene mutation positive [Z15.01, Z15.09])    Surgeons: Kaya Mills MD; Ashely Rodriguez MD Provider: Herber Otero MD    Anesthesia Type: general ASA Status: 3          Anesthesia Type: general    Vitals  Vitals Value Taken Time   /75 09/13/22 1705   Temp 35.9 °C (96.7 °F) 09/13/22 1631   Pulse 92 09/13/22 1713   Resp 14 09/13/22 1705   SpO2 95 % 09/13/22 1713   Vitals shown include unvalidated device data.        Post Anesthesia Care and Evaluation    Patient location during evaluation: bedside  Patient participation: complete - patient participated  Level of consciousness: awake  Pain score: 0  Pain management: adequate    Airway patency: patent  Anesthetic complications: No anesthetic complications  PONV Status: none  Cardiovascular status: acceptable and stable  Respiratory status: acceptable and room air  Hydration status: acceptable    Comments: An Anesthesiologist personally participated in the most demanding procedures (including induction and emergence if applicable) in the anesthesia plan, monitored the course of anesthesia administration at frequent intervals and remained physically present and available for immediate diagnosis and treatment of emergencies.            
2023 20:39

## 2023-01-04 PROBLEM — R52 INTRACTABLE PAIN: Status: ACTIVE | Noted: 2023-01-01

## 2023-01-04 NOTE — ED PROVIDER NOTES
"Time: 4:33 PM EST  Date of encounter:  1/4/2023  Independent Historian/Clinical History and Information was obtained by:   Patient and Family  Chief Complaint   Patient presents with   • Flank Pain       History is limited by: N/A    History of Present Illness:  Patient is a 57 y.o. year old female who presents to the emergency department for evaluation of left flank pain.  The patient reports history of kidney stones and she has a CT scan scheduled for 1/15/2022.  Pain has progressively worsened and she is not able to wait until then.  Had 2 days of hematuria. Pain is severe. She has felt feverish, chills, nausea today, shaking today. She endorses mild congestion, sore throat and cough that seemed to be improving, and exposure to sick child but mostly concerned about severe left flank pain that feels like a kidney stone. Has a history of non-alcoholic steatosis (drinks one glass of wine every now and then), denies recreational drug use, and quit smoking tobacco in 2016.       History provided by:  Patient   used: No        Patient Care Team  Primary Care Provider: Rafael Mi MD    Past Medical History:     Allergies   Allergen Reactions   • Atorvastatin Hives   • Atorvastatin Calcium Swelling   • Ciprofloxacin Itching     FEVER,CHILLS   • Erythromycin Hives and Nausea And Vomiting   • Fentanyl Itching     FROM DURAGESIC PATCH   • Rosuvastatin Hives   • Statins Hives   • Penicillins Itching     YEAST INFECTION/\"THEY DONT SEEM TO WORK\"     Past Medical History:   Diagnosis Date   • Asthma    • Benign essential hypertension    • BRCA gene mutation positive     TO HAVE ELECTIVE BILATERAL MASTECTOMY   • Chronic lower back pain    • Cluster headache    • Colon polyp     REMOVED   • Constipation    • Depression    • Diabetes mellitus (HCC)    • Difficulty walking     USES CANE SINCE BACK SURGERY 3/14/2022   • Disc degeneration, lumbar    • Elevated fasting glucose    • Elevated transaminase " level     FATTY LIVER   • Fatigue    • GERD (gastroesophageal reflux disease)    • Hemorrhoids    • Hypothyroidism    • IBS (irritable bowel syndrome)    • Kidney stones    • Memory loss    • Migraine    • Seizures (HCC)     LAST 2020, 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   • Stomach ulcer    • KULDIP (stress urinary incontinence, female)      Past Surgical History:   Procedure Laterality Date   • BREAST RECONSTRUCTION Bilateral 9/13/2022    Procedure: Bilateral breast reconstruction with placement of implants and mesh, use of spy;  Surgeon: Ashely Rodriguez MD;  Location: [facility] OR [facility] [place];  Service: Plastics;  Laterality: Bilateral;   • COLONOSCOPY     • ENDOSCOPY     • HYSTERECTOMY     • INTERSTIM PLACEMENT     • KIDNEY STONE SURGERY      MULTIPLE   • LUMBAR DISC SURGERY      MULTIPLE, FUSION LUMBAR SPINE 3/14/22   • MASTECTOMY Bilateral 9/13/2022    Procedure: BREAST MASTECTOMY BILATERAL;  Surgeon: Kaya Mills MD;  Location: [facility] OR [facility] [place];  Service: General;  Laterality: Bilateral;   • ORIF HUMERAL SHAFT FRACTURE      W/IMPLANTS     Family History   Problem Relation Age of Onset   • Brain cancer Mother    • Arthritis Mother    • Bleeding Disorder Mother    • Diabetes Mother    • Hypertension Mother    • Other Mother         malignant neoplasm   • Migraines Mother    • Thyroid disease Mother    • Arthritis Father    • Hypertension Father    • Other Father         malignant neoplasm   • Migraines Father    • Thyroid disease Father    • Breast cancer Maternal Aunt    • Arthritis Maternal Grandmother    • Diabetes Maternal Grandmother    • Malig Hyperthermia Neg Hx        Home Medications:  Prior to Admission medications    Medication Sig Start Date End Date Taking? Authorizing Provider   albuterol sulfate  (90 Base) MCG/ACT inhaler Inhale 2 puffs Every 4 (Four) Hours As Needed for Wheezing. 7/15/22   Rafael Mi MD   ALPRAZolam (XANAX) 2 MG tablet Take 2 mg by mouth 3 (Three) Times a Day As Needed. 7/27/22    Tana Avitia MD   amitriptyline (ELAVIL) 25 MG tablet Take 1 tablet by mouth Every Night. 7/15/22   Rafael Mi MD   B Complex-Folic Acid (Super B Complex Maxi) tablet Take  by mouth.    Tana Avitia MD   Biotin 99228 MCG tablet Take  by mouth.    Tana Avitia MD   Calcium Carbonate 1500 (600 Ca) MG tablet Take  by mouth.    Tana Avitia MD   celecoxib (CeleBREX) 200 MG capsule Take 1 capsule by mouth Daily.  Patient taking differently: Take 200 mg by mouth Daily. LAST  DOSE 9/8/22 7/15/22   Rafael Mi MD   DULoxetine (CYMBALTA) 60 MG capsule Take 1 capsule by mouth Daily.  Patient taking differently: Take 60 mg by mouth 2 (Two) Times a Day. 7/15/22   Rafael Mi MD   fluticasone (FLONASE) 50 MCG/ACT nasal spray USE 2 SPRAYS IN EACH NOSTRIL EVERY DAY . APPOINTMENT IS NEEDED BEFORE MORE REFILLS 12/8/22   Rafael Mi MD   furosemide (LASIX) 40 MG tablet TAKE 1 TABLET EVERY DAY 12/8/22   Rafael Mi MD   hydroCHLOROthiazide (HYDRODIURIL) 25 MG tablet TAKE 1 TABLET EVERY DAY 1/4/23   Mickie Samson,    ipratropium-albuterol (DUO-NEB) 0.5-2.5 mg/mL nebulizer Ipratropium-Albuterol 0.5-2.5 (3) MG/3ML Inhalation Solution; Patient Sig: Ipratropium-Albuterol 0.5-2.5 (3) MG/3ML Inhalation Solution ; 360; 0; 20-Oct-2015; Active 10/20/15   Tana vAitia MD   lamoTRIgine (LaMICtal) 25 MG tablet Take 25 mg by mouth Daily. 9/15/15   Tana Avitia MD   lansoprazole (PREVACID) 15 MG capsule Take 1 capsule by mouth Daily. 7/15/22   Rafael Mi MD   levETIRAcetam (KEPPRA) 750 MG tablet TAKE 1 TABLET TWICE DAILY 11/21/22   Rafael Mi MD   levothyroxine (SYNTHROID, LEVOTHROID) 50 MCG tablet Take 1 tablet by mouth Daily.  Patient taking differently: Take 50 mcg by mouth Every Morning. 7/15/22   Rafael Mi MD   linaclotide (LINZESS) 290 MCG capsule capsule Take 1 capsule by mouth Every Morning Before  Breakfast. 7/15/22   Rafael Mi MD   lisinopril (PRINIVIL,ZESTRIL) 20 MG tablet Take 1 tablet by mouth Daily.  Patient taking differently: Take 20 mg by mouth Daily. INST PER ANESTHESIA PROTOCOL 7/15/22   Rafael Mi MD   lubiprostone (AMITIZA) 24 MCG capsule Take 24 mcg by mouth. 7/15/22   Tana Avitia MD   Omega 3 1000 MG capsule Take  by mouth.    Tana Avitia MD   polyethylene glycol (MIRALAX) 17 GM/SCOOP powder Take 17 g by mouth.    Tana Avitia MD   potassium chloride (K-DUR,KLOR-CON) 20 MEQ CR tablet Take 1 tablet by mouth 2 (Two) Times a Day. 7/15/22   Rafael Mi MD   vitamin E 400 UNIT capsule Take 400 Units by mouth Daily.    Tana Avitia MD   hydroCHLOROthiazide (HYDRODIURIL) 25 MG tablet Take 1 tablet by mouth Daily.  Patient taking differently: Take 25 mg by mouth Daily. INST PER ANESTHESIA PROTOCOL 7/15/22 1/4/23  Rafael Mi MD        Social History:   Social History     Tobacco Use   • Smoking status: Former     Packs/day: 1.00     Years: 30.00     Pack years: 30.00     Types: Cigarettes     Quit date: 2016     Years since quittin.0   • Smokeless tobacco: Never   Vaping Use   • Vaping Use: Some days   • Substances: Flavoring   • Devices: Disposable, Pre-filled or refillable cartridge, Refillable tank, Pre-filled pod   • Passive vaping exposure: Yes   Substance Use Topics   • Alcohol use: Yes     Comment: occasionally   • Drug use: Defer         Review of Systems:  Review of Systems   Constitutional: Positive for chills and fever (subjective).   HENT: Positive for congestion and sore throat.    Respiratory: Positive for cough. Negative for shortness of breath.    Cardiovascular: Negative for chest pain.   Gastrointestinal: Positive for nausea. Negative for abdominal pain, diarrhea and vomiting.        Negative for bowel incontinence   Genitourinary: Positive for flank pain and hematuria. Negative for dysuria.         "Negative for bladder incontinence   Musculoskeletal: Positive for back pain.   Skin: Negative for rash.   Neurological: Negative for weakness and numbness.        Negative for saddle anesthesia   All other systems reviewed and are negative.       Physical Exam:  BP (!) 190/90 (BP Location: Left arm, Patient Position: Lying)   Pulse 108   Temp 98.3 °F (36.8 °C) (Oral)   Resp 18   Ht 157.5 cm (62.01\")   Wt 93.1 kg (205 lb 4 oz)   SpO2 96%   BMI 37.53 kg/m²     Physical Exam  Vitals and nursing note reviewed.   Constitutional:       Appearance: Normal appearance. She is ill-appearing and diaphoretic. She is not toxic-appearing.   HENT:      Head: Normocephalic.      Nose: Nose normal.      Mouth/Throat:      Mouth: Mucous membranes are moist.   Eyes:      Conjunctiva/sclera: Conjunctivae normal.      Pupils: Pupils are equal, round, and reactive to light.   Cardiovascular:      Rate and Rhythm: Regular rhythm. Tachycardia present.   Pulmonary:      Effort: Pulmonary effort is normal.      Breath sounds: Normal breath sounds.   Abdominal:      General: There is no distension.      Tenderness: There is left CVA tenderness. There is no guarding or rebound.   Musculoskeletal:         General: Normal range of motion.      Cervical back: Normal range of motion and neck supple.   Skin:     General: Skin is warm.      Capillary Refill: Capillary refill takes less than 2 seconds.   Neurological:      General: No focal deficit present.      Mental Status: She is alert and oriented to person, place, and time.   Psychiatric:         Mood and Affect: Mood normal.         Behavior: Behavior normal.                  Procedures:  Procedures      Medical Decision Making:      Comorbidities that affect care:    seizures, Hypertension, Thyroid Disease    External Notes reviewed:    Previous Admission Note      The following orders were placed and all results were independently analyzed by me:  Orders Placed This Encounter "   Procedures   • COVID-19,APTIMA PANTHER(YASEMIN),[facility] BILL/ ANISH, NP/OP SWAB IN UTM/VTM/SALINE TRANSPORT MEDIA,24 HR TAT - Swab, Nasal Cavity   • Influenza Antigen, Rapid - Swab, Nasopharynx   • Blood Culture - Blood,   • Blood Culture - Blood,   • CT Abdomen Pelvis Stone Protocol   • XR Chest 1 View   • [place] Draw   • Comprehensive Metabolic Panel   • Lipase   • Urinalysis With Microscopic If Indicated (No Culture) - Urine, Clean Catch   • Lactic Acid, Plasma   • CBC Auto Differential   • Scan Slide   • Magnesium   • Urinalysis, Microscopic Only - Urine, Clean Catch   • STAT Lactic Acid, Reflex   • STAT Lactic Acid, Reflex   • Levetiracetam Level (Keppra)   • CBC Auto Differential   • Comprehensive Metabolic Panel   • Hemoglobin A1c   • STAT Lactic Acid, Reflex   • STAT Lactic Acid, Reflex   • Diet: Regular/House Diet; Texture: Regular Texture (IDDSI 7); Fluid Consistency: Thin (IDDSI 0)   • Vital Signs   • Vital Signs   • Intake & Output   • Weigh Patient   • Oral Care   • Saline Lock & Maintain IV Access   • Activity - Ad Fatoumata   • Notify Provider (With Default Parameters)   • Code Status and Medical Interventions:   • Inpatient Hospitalist Consult   • Inpatient Case Management  Consult   • Inpatient Nutrition Consult   • Oxygen Therapy- Nasal Cannula; Titrate for SPO2: 90% - 95%   • Insert Peripheral IV   • Insert Peripheral IV   • Initiate Observation Status   • CBC & Differential   • Green Top (Gel)   • Lavender Top   • Gold Top - SST   • Light Blue Top       Medications Given in the Emergency Department:  Medications   sodium chloride 0.9 % flush 10 mL (has no administration in time range)   albuterol sulfate HFA (PROVENTIL HFA;VENTOLIN HFA;PROAIR HFA) inhaler 2 puff (has no administration in time range)   sodium chloride 0.9 % flush 10 mL (has no administration in time range)   sodium chloride 0.9 % flush 10 mL (10 mL Intravenous Given 1/5/23 0132)   sodium chloride 0.9 % infusion 40 mL (has no  administration in time range)   Enoxaparin Sodium (LOVENOX) syringe 40 mg (has no administration in time range)   lactated ringers infusion (100 mL/hr Intravenous New Bag 1/5/23 0132)   acetaminophen (TYLENOL) tablet 650 mg (has no administration in time range)     Or   acetaminophen (TYLENOL) 160 MG/5ML solution 650 mg (has no administration in time range)     Or   acetaminophen (TYLENOL) suppository 650 mg (has no administration in time range)   HYDROmorphone (DILAUDID) injection 0.5 mg (0.5 mg Intravenous Given 1/5/23 0131)     And   naloxone (NARCAN) injection 0.4 mg (has no administration in time range)   ondansetron (ZOFRAN) injection 4 mg (has no administration in time range)   sodium chloride 0.9 % bolus 1,000 mL (0 mL Intravenous Stopped 1/4/23 2011)   ondansetron ODT (ZOFRAN-ODT) disintegrating tablet 4 mg (4 mg Oral Given 1/4/23 1720)   HYDROmorphone (DILAUDID) injection 1 mg (1 mg Intravenous Given 1/4/23 1720)   potassium chloride (MICRO-K) CR capsule 40 mEq (40 mEq Oral Given 1/4/23 1848)   ketorolac (TORADOL) injection 15 mg (15 mg Intravenous Given 1/4/23 1942)   lactated ringers bolus 1,000 mL (1,000 mL Intravenous Handoff 1/4/23 2305)        ED Course:    The patient was initially evaluated in the triage area where orders were placed. The patient was later dispositioned by Kaylyn Enciso PA-C.      The patient was advised to stay for completion of workup which includes but is not limited to communication of labs and radiological results, reassessment and plan. The patient was advised that leaving prior to disposition by a provider could result in critical findings that are not communicated to the patient.          Labs:    Lab Results (last 24 hours)     Procedure Component Value Units Date/Time    CBC & Differential [[id number]]  (Abnormal) Collected: 01/04/23 1648    Specimen: Blood Updated: 01/04/23 1717    Narrative:      The following orders were created for panel order CBC &  Differential.  Procedure                               Abnormality         Status                     ---------                               -----------         ------                     CBC Auto Differential[[id number]]        Abnormal            Final result               Scan Slide[[id number]]                                       Final result                 Please view results for these tests on the individual orders.    Comprehensive Metabolic Panel [[id number]]  (Abnormal) Collected: 01/04/23 1648    Specimen: Blood Updated: 01/04/23 1722     Glucose 234 mg/dL      BUN 15 mg/dL      Creatinine 0.82 mg/dL      Sodium 140 mmol/L      Potassium 3.2 mmol/L      Comment: Slight hemolysis detected by analyzer. Results may be affected.        Chloride 99 mmol/L      CO2 22.5 mmol/L      Calcium 10.6 mg/dL      Total Protein 8.1 g/dL      Albumin 4.9 g/dL      ALT (SGPT) 77 U/L      AST (SGOT) 71 U/L      Alkaline Phosphatase 126 U/L      Total Bilirubin 0.5 mg/dL      Globulin 3.2 gm/dL      A/G Ratio 1.5 g/dL      BUN/Creatinine Ratio 18.3     Anion Gap 18.5 mmol/L      eGFR 83.0 mL/min/1.73      Comment: National Kidney Foundation and American Society of Nephrology (ASN) Task Force recommended calculation based on the Chronic Kidney Disease Epidemiology Collaboration (CKD-EPI) equation refit without adjustment for race.       Narrative:      GFR Normal >60  Chronic Kidney Disease <60  Kidney Failure <15      Lipase [[id number]]  (Normal) Collected: 01/04/23 1648    Specimen: Blood Updated: 01/04/23 1722     Lipase 29 U/L     CBC Auto Differential [[id number]]  (Abnormal) Collected: 01/04/23 1648    Specimen: Blood Updated: 01/04/23 1717     WBC 11.74 10*3/mm3      RBC 5.07 10*6/mm3      Hemoglobin 15.6 g/dL      Hematocrit 45.2 %      MCV 89.2 fL      MCH 30.8 pg      MCHC 34.5 g/dL      RDW 13.8 %      RDW-SD 43.7 fl      MPV 10.7 fL      Platelets 165 10*3/mm3      Neutrophil % 81.5 %      Lymphocyte % 11.1 %       Monocyte % 5.0 %      Eosinophil % 1.3 %      Basophil % 0.7 %      Immature Grans % 0.4 %      Neutrophils, Absolute 9.57 10*3/mm3      Lymphocytes, Absolute 1.30 10*3/mm3      Monocytes, Absolute 0.59 10*3/mm3      Eosinophils, Absolute 0.15 10*3/mm3      Basophils, Absolute 0.08 10*3/mm3      Immature Grans, Absolute 0.05 10*3/mm3      nRBC 0.0 /100 WBC     Scan Slide [[id number]] Collected: 01/04/23 1648    Specimen: Blood Updated: 01/04/23 1717     Dacrocytes Slight/1+     Macrocytes Slight/1+     Target Cells Slight/1+     WBC Morphology Normal     Platelet Estimate Adequate     Large Platelets Slight/1+    Magnesium [[id number]]  (Normal) Collected: 01/04/23 1648    Specimen: Blood Updated: 01/04/23 1744     Magnesium 1.6 mg/dL     Urinalysis With Microscopic If Indicated (No Culture) - Urine, Clean Catch [[id number]]  (Abnormal) Collected: 01/04/23 1724    Specimen: Urine, Clean Catch Updated: 01/04/23 1749     Color, UA Yellow     Appearance, UA Clear     pH, UA 7.0     Specific Gravity, UA 1.008     Glucose, UA Negative     Ketones, UA Negative     Bilirubin, UA Negative     Blood, UA Negative     Protein,  mg/dL (2+)     Leuk Esterase, UA Negative     Nitrite, UA Negative     Urobilinogen, UA 0.2 E.U./dL    Lactic Acid, Plasma [[id number]]  (Abnormal) Collected: 01/04/23 1724    Specimen: Blood Updated: 01/04/23 1813     Lactate 4.3 mmol/L     Urinalysis, Microscopic Only - Urine, Clean Catch [[id number]]  (Abnormal) Collected: 01/04/23 1724    Specimen: Urine, Clean Catch Updated: 01/04/23 1749     RBC, UA 0-2 /HPF      WBC, UA 0-2 /HPF      Bacteria, UA None Seen /HPF      Squamous Epithelial Cells, UA 0-2 /HPF      Hyaline Casts, UA 0-2 /LPF      Methodology Automated Microscopy    STAT Lactic Acid, Reflex [[id number]]  (Abnormal) Collected: 01/04/23 1855    Specimen: Blood Updated: 01/04/23 1926     Lactate 2.2 mmol/L     COVID-19,APTIMA PANTHER(YASEMIN),[facility] BILL/ ANISH, NP/OP SWAB IN UTM/VTM/SALINE  TRANSPORT MEDIA,24 HR TAT - Swab, Nasal Cavity [[id number]]  (Normal) Collected: 01/04/23 1855    Specimen: Swab from Nasal Cavity Updated: 01/05/23 0008     COVID19 Not Detected    Narrative:      Fact sheet for providers: https://www.fda.gov/media/028273/download     Fact sheet for patients: https://www.fda.gov/media/448877/download    Test performed by RT PCR.    Influenza Antigen, Rapid - Swab, Nasopharynx [[id number]]  (Normal) Collected: 01/04/23 1855    Specimen: Swab from Nasopharynx Updated: 01/04/23 1944     Influenza A Ag, EIA Negative     Influenza B Ag, EIA Negative    STAT Lactic Acid, Reflex [[id number]]  (Abnormal) Collected: 01/04/23 2139    Specimen: Blood Updated: 01/04/23 2211     Lactate 2.9 mmol/L     Levetiracetam Level (Keppra) [[id number]] Collected: 01/04/23 2139    Specimen: Blood Updated: 01/04/23 2147    Blood Culture - Blood, Hand, Right [[id number]] Collected: 01/04/23 2139    Specimen: Blood from Hand, Right Updated: 01/04/23 2147    Blood Culture - Blood, Hand, Left [[id number]] Collected: 01/04/23 2139    Specimen: Blood from Hand, Left Updated: 01/04/23 2148    STAT Lactic Acid, Reflex [[id number]]  (Abnormal) Collected: 01/05/23 0054    Specimen: Blood Updated: 01/05/23 0129     Lactate 2.1 mmol/L     Hemoglobin A1c [[id number]] Collected: 01/05/23 0054    Specimen: Blood Updated: 01/05/23 0100           Imaging:    XR Chest 1 View    Result Date: 1/4/2023  PROCEDURE: XR CHEST 1 VW  COMPARISON: 5/20/2018.  INDICATIONS: fever, congestion  FINDINGS: A single frontal (AP or PA upright portable) chest radiograph reveals no cardiac enlargement.  There may be new atelectasis and/or infiltrate(s) in the bilateral lung bases.  Pneumonia cannot be excluded.  Atelectasis alone would be in the differential diagnosis.  Artifact is possible.  No pneumothorax is seen.  Postoperative changes involve the cervical spine and the left humerus.  Surgical clips are thought to be projected over the right  lateral chest wall, versus external artifact. The thoracic aorta is atherosclerotic.  Chronic calcified granulomatous disease involves the chest.        New atelectasis and/or infiltrate(s) is (are) suggested in the lung bases and may represent atelectasis alone.  Pneumonia cannot be excluded.     Please note that portions of this note were completed with a voice recognition program.  LUCIA GATES JR, MD       Electronically Signed and Approved By: LUCIA GATES JR, MD on 1/04/2023 at 21:44              CT Abdomen Pelvis Stone Protocol    Result Date: 1/4/2023  PROCEDURE: CT ABDOMEN PELVIS STONE PROTOCOL  COMPARISON: [facility], CT, ABDOMEN/PELVIS WITH CONTRAST, 6/07/2018, 12:58.  INDICATIONS: Flank pain, kidney stone suspected  TECHNIQUE: CT images were created without intravenous contrast.   PROTOCOL:   Standard imaging protocol performed    RADIATION:   DLP: 864.2mGy*cm   Automated exposure control was utilized to minimize radiation dose.  FINDINGS:  In the right lower lobe is a 0.4 cm nodule, new since 6/7/2018.  The liver demonstrates diffuse low density consistent with steatosis.  No focal hepatic lesion is seen.  The spleen, pancreas and adrenal glands appear unremarkable.  There are 2 nonobstructing right renal stones measuring 0.3 cm and 0.6 cm.  A 0.2 cm nonobstructing left renal stone is noted.  No ureteral stone is identified, ever, portions of the ureters are obscured by streak artifact related to the patient's previous lumbar spine fusion.  Moderate atherosclerotic calcification is noted in the abdominal aorta and its major branches.  No adenopathy or free fluid is seen in the abdomen or pelvis.  A hysterectomy has been performed.  The adnexal regions appear unremarkable.  No acute bowel abnormality is identified.  The lack of oral contrast limits evaluation of the bowel.  Small fat containing bilateral inguinal hernias are noted.  In the T12 vertebral body is a 2.1 cm lytic focus  which is chronic, compatible with a hemangioma.  There is been previous fusion of L3 through S2.        1. Indeterminate 0.4 cm nodule in the right lower lobe.The findings include a single, incidentally detected, solid pulmonary nodule, measuring less than 6mm.  2017 guidelines from the Fleischner Society for the follow-up and management of incidentally detected indeterminate pulmonary nodules in persons at least 35 years of age depend on nodule size (average length and width) and underlying risk factors (including smoking and other risk factors). Please consider the following recommendations after clinical assessment of risk factors.  For <6mm solid nodules: In low risk patients, no follow-up required.  If suspicious morphology or upper lobe location, consider 12 month follow-up.  In high risk patients, optional CT in 12 months.  2. Diffuse hepatic steatosis 3. Previous hysterectomy 4. Bilateral nonobstructing renal stones, as above 5. No ureteral stone identified.  Portions of the ureters are obscured, as above 6. Small bilateral fat containing inguinal hernias     Walter Macias M.D.       Electronically Signed and Approved By: Walter Macias M.D. on 1/04/2023 at 17:23                 Differential Diagnosis and Discussion:      Abdominal Pain: Based on the patient's signs and symptoms, I considered abdominal aortic aneurysm, small bowel obstruction, pancreatitis, acute cholecystitis, acute appendecitis, peptic ulcer disease, gastritis, colitis, endocrine disorders, irritable bowel syndrome and other differential diagnosis an etiology of the patient's abdominal pain.    All labs were reviewed and analyzed by me.  CT scan radiology interpretation was reviewed by me.    MDM         Patient Care Considerations:    None      Consultants/Shared Management Plan:    Hospitalist: I have discussed the case with Dr. Lehman who agrees to accept the patient for admission.    Social Determinants of Health:    Patient is  independent, reliable, and has access to care.       Disposition and Care Coordination:    Admit:   Through independent evaluation of the patient's history, physical, and imperical data, the patient meets criteria for observation/admission to the hospital.    Patient with prior history of kidney stones with presentation similar to prior.  She has continued lactic acidosis despite fluids. CT shows intrarenal nonobstructing stones, no evidence ureteral stone but does have artifact from lumbar fusion. I have discussed incidental finding of pulmonary nodule and recommendation for repeat CT in 12 months. Flu is negative. Consult with Dr. Lehman who is in agreement with admission.     Final diagnoses:   Flank pain   Intractable pain        ED Disposition     ED Disposition   Decision to Admit    Condition   --    Comment   Level of Care: Med/Surg [1]   Diagnosis: Intractable pain [494276]               This medical record created using voice recognition software.           Kaylyn Enciso PA-C  01/05/23 0227

## 2023-01-05 PROBLEM — R10.9 FLANK PAIN: Status: ACTIVE | Noted: 2023-01-01

## 2023-01-05 NOTE — PROGRESS NOTES
[facility]   Hospitalist Progress Note  Date: 2023  Patient Name: Lidia Yeung  : 1965  MRN: [id number]  Date of admission: 2023      Subjective   Subjective     Chief Complaint: Left flank pain.  Elevated lactic acid level    Summary:   Lidia Yeung is a 57 y.o. female past medical history of diabetes mellitus, seizure disorder, chronic low back pain that presents to the emergency department for evaluation of back pain x2 weeks.  She describes it as left-sided, throbbing, constant, no known aggravating relieving factors, does not radiate.  She Diveley she had a kidney stone which was causing her pain and since it has been progressing she sought further medical attention.  In the emergency department she was found to have 2 nonobstructing kidney stones on the right and 1 on the left the greatest being 0.6 cm.  Patient received Dilaudid in the emergency department and plan was to discharge home however lactic acid returned at 4.5.  Therefore she will be admitted for ongoing monitoring and management.  Patient does endorse some subjective fever and chills along with nausea but no vomiting.  She denies any chest pain, shortness of breath, palpitations, abdominal pain, diarrhea constipation, dysuria, weakness, rash.  Lactic acidosis resolved then repeat trended up again, discharge canceled and 3 started IV fluid     Interval Followup:    Blood pressure up.  On room air.  Tachycardia better.  No fever.  Complaining of left flank pain some radiation anteriorly to the groin area.  Per patient she has weaned her self off from opioids as she got addicted to it.  No urinary complaints.  No cough or phlegm  Patient very anxious as per nursing staff    Review of Systems   All systems were reviewed and negative except for:     Objective   Objective     Vitals:   Temp:  [97.4 °F (36.3 °C)-98.3 °F (36.8 °C)] 97.7 °F (36.5 °C)  Heart Rate:  [] 111  Resp:  [17-18] 18  BP: (138-190)/(81-95)  138/81  Physical Exam      Constitutional: Awake, alert, no acute distress              Eyes: Pupils equal, sclerae anicteric, no conjunctival injection              HENT: NCAT, mucous membranes moist              Neck: Supple, no thyromegaly, no lymphadenopathy, trachea midline              Respiratory: Clear to auscultation bilaterally, nonlabored respirations               Cardiovascular: RRR, no murmurs, rubs, or gallops, palpable pedal pulses bilaterally              Gastrointestinal: Positive bowel sounds, soft, tender lower left flank area, nondistended              Musculoskeletal: No bilateral ankle edema, no clubbing or cyanosis to extremities              Psychiatric: Appropriate affect, cooperative              Neurologic: Oriented x 3, strength symmetric in all extremities, Cranial Nerves grossly intact to confrontation, speech clear              Skin: No rashes     Result Review    Result Review:  I have personally reviewed the results from the time of this admission to 1/5/2023 17:38 EST and agree with these findings:  [x]  Laboratory  []  Microbiology  [x]  Radiology  []  EKG/Telemetry   []  Cardiology/Vascular   []  Pathology  []  Old records  []  Other:    Assessment & Plan   Assessment / Plan     Assessment:  Recurrent lactic acidosis.  Etiology not clear.  Left flank pain.  Chronic pain syndrome.  Bilateral nonobstructing renal stone.  2 on the right 1 in left.  Leukocytosis.  Resolved.  Diabetes mellitus diet controlled.  Hypertension.  History of asthma.  Stable.  Peptic ulcer disease.  IBS.  S/p bilateral mastectomy with suction drain on the left laterally..  S/p InterStim placement.  Bibasilar infiltrate likely atelectasis       Plan:  Continue IV fluid.  Trend lactic acid level.  Procalcitonin negative.  White cell count normalized.  Hold off antibiotics  Flu screen and COVID swab negative.  Chest x-ray with bibasilar infiltrate.  No pneumonia symptoms.  CT scan abdomen pelvis  noted.  Urology consulted.   Replace electrolytes.  Sliding-scale insulin.  Await hemoglobin A1c.  Diabetic diet.  Resume home Keppra, Cymbalta and Ativan.  Incentive spirometry.  Hold home diuretics.  IVF oral narcotics along with Toradol for pain control.  Wound eval    Discussed plan with RN and .    DVT prophylaxis:  Medical DVT prophylaxis orders are present.    CODE STATUS:   Code Status (Patient has no pulse and is not breathing): CPR (Attempt to Resuscitate)  Medical Interventions (Patient has pulse or is breathing): Full Support      Part of this note may be an electronic transcription/translation of spoken language to printed text using the Dragon Dictation System.     Electronically signed by Charli Barreto MD, 01/05/23, 5:38 PM EST.

## 2023-01-05 NOTE — H&P
[facility]  [facility]   Patient Name: Lidia Yeung  : 1965  MRN: [id number]  Primary Care Physician:  Rafael Mi MD  Date of admission: 2023    Subjective   Subjective     Chief Complaint: Back pain    HPI:    Lidia Yeung is a 57 y.o. female past medical history of diabetes mellitus, seizure disorder, chronic low back pain that presents to the emergency department for evaluation of back pain x2 weeks.  She describes it as left-sided, throbbing, constant, no known aggravating relieving factors, does not radiate.  She Diveley she had a kidney stone which was causing her pain and since it has been progressing she sought further medical attention.  In the emergency department she was found to have 2 nonobstructing kidney stones on the right and 1 on the left the greatest being 0.6 cm.  Patient received Dilaudid in the emergency department and plan was to discharge home however lactic acid returned at 4.5.  Therefore she will be admitted for ongoing monitoring and management.  Patient does endorse some subjective fever and chills along with nausea but no vomiting.  She denies any chest pain, shortness of breath, palpitations, abdominal pain, diarrhea constipation, dysuria, weakness, rash.      Personal History     Past Medical History:  Past Medical History:   Diagnosis Date   • Asthma    • Benign essential hypertension    • BRCA gene mutation positive     TO HAVE ELECTIVE BILATERAL MASTECTOMY   • Chronic lower back pain    • Cluster headache    • Colon polyp     REMOVED   • Constipation    • Depression    • Diabetes mellitus (HCC)    • Difficulty walking     USES CANE SINCE BACK SURGERY 3/14/2022   • Disc degeneration, lumbar    • Elevated fasting glucose    • Elevated transaminase level     FATTY LIVER   • Fatigue    • GERD (gastroesophageal reflux disease)    • Hemorrhoids    • Hypothyroidism    • IBS (irritable bowel syndrome)    • Kidney stones     • Memory loss    • Migraine    • Seizures (HCC)     LAST , 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 MAL   • Stomach ulcer    • KULDIP (stress urinary incontinence, female)          Past Surgical History:  Past Surgical History:   Procedure Laterality Date   • BREAST RECONSTRUCTION Bilateral 2022    Procedure: Bilateral breast reconstruction with placement of implants and mesh, use of spy;  Surgeon: Ashely Rodriguez MD;  [place] [facility] [place] [facility] [place]  Service: Plastics;  Laterality: Bilateral;   • COLONOSCOPY     • ENDOSCOPY     • HYSTERECTOMY     • INTERSTIM PLACEMENT     • KIDNEY STONE SURGERY      MULTIPLE   • LUMBAR DISC SURGERY      MULTIPLE, FUSION LUMBAR SPINE 3/14/22   • MASTECTOMY Bilateral 2022    Procedure: BREAST MASTECTOMY BILATERAL;  Surgeon: aKya Mills MD;  [place] [facility] [place] [facility] [place]  Service: General;  Laterality: Bilateral;   • ORIF HUMERAL SHAFT FRACTURE      W/IMPLANTS         Family History:   Family History   Problem Relation Age of Onset   • Brain cancer Mother    • Arthritis Mother    • Bleeding Disorder Mother    • Diabetes Mother    • Hypertension Mother    • Other Mother         malignant neoplasm   • Migraines Mother    • Thyroid disease Mother    • Arthritis Father    • Hypertension Father    • Other Father         malignant neoplasm   • Migraines Father    • Thyroid disease Father    • Breast cancer Maternal Aunt    • Arthritis Maternal Grandmother    • Diabetes Maternal Grandmother    • Malig Hyperthermia Neg Hx          Social History:   Social History     Tobacco Use   • Smoking status: Former     Packs/day: 1.00     Years: 30.00     Pack years: 30.00     Types: Cigarettes     Quit date: 2016     Years since quittin.0   • Smokeless tobacco: Never   Vaping Use   • Vaping Use: Some days   • Substances: Flavoring   • Devices: Disposable, Pre-filled or refillable cartridge, Refillable tank, Pre-filled pod   • Passive vaping exposure: Yes   Substance Use Topics   • Alcohol use: Yes     Comment:  "occasionally   • Drug use: Defer         Home Medications:  ALPRAZolam, Biotin, Calcium Carbonate, DULoxetine, Omega 3, Super B Complex Maxi, albuterol sulfate HFA, amitriptyline, celecoxib, fluticasone, furosemide, hydroCHLOROthiazide, ipratropium-albuterol, lamoTRIgine, lansoprazole, levETIRAcetam, levothyroxine, linaclotide, lisinopril, lubiprostone, polyethylene glycol, potassium chloride, and vitamin E    Allergies:  Allergies   Allergen Reactions   • Atorvastatin Hives   • Atorvastatin Calcium Swelling   • Ciprofloxacin Itching     FEVER,CHILLS   • Erythromycin Hives and Nausea And Vomiting   • Fentanyl Itching     FROM DURAGESIC PATCH   • Rosuvastatin Hives   • Statins Hives   • Penicillins Itching     YEAST INFECTION/\"THEY DONT SEEM TO WORK\"       Review of Systems   All systems were reviewed and negative except for: Back pain, nausea    Objective   Objective     Vitals:   Temp:  [98 °F (36.7 °C)-98.4 °F (36.9 °C)] 98 °F (36.7 °C)  Heart Rate:  [120-135] 120  Resp:  [17-20] 17  BP: (131-173)/() 173/95    Physical Exam    Constitutional: Awake, alert, no acute distress   Eyes: Pupils equal, sclerae anicteric, no conjunctival injection   HENT: NCAT, mucous membranes moist   Neck: Supple, no thyromegaly, no lymphadenopathy, trachea midline   Respiratory: Clear to auscultation bilaterally, nonlabored respirations    Cardiovascular: RRR, no murmurs, rubs, or gallops, palpable pedal pulses bilaterally   Gastrointestinal: Positive bowel sounds, soft, nontender, nondistended   Musculoskeletal: No bilateral ankle edema, no clubbing or cyanosis to extremities   Psychiatric: Appropriate affect, cooperative   Neurologic: Oriented x 3, strength symmetric in all extremities, Cranial Nerves grossly intact to confrontation, speech clear   Skin: No rashes     Result Review    Result Review:  I have personally reviewed the results from the time of this admission to 1/4/2023 20:54 EST and agree with these findings:  [x]  " Laboratory  []  Microbiology  [x]  Radiology  []  EKG/Telemetry   []  Cardiology/Vascular   []  Pathology  []  Old records  []  Other:      Assessment & Plan   Assessment / Plan     Assessment/Plan:   Intractable back pain: Admit continue to monitor.  Supportive care and pain control.  Patient can likely follow-up as outpatient for further evaluation.  No neurological deficits identified.  Nephrolithiasis: Nonobstructing.  Lactic acidosis: We will continue hydration overnight and monitor serial labs.  Cultures have been obtained elevated no leukocytosis and afebrile.  Will monitor off antibiotics.  Seizure disorder: We will resume home medications once verified  Diabetes mellitus type 2: Does not appear to be on any antidiabetic medications.  Monitor.  Check hemoglobin A1c  Incidental lung nodule: Outpatient follow-up      DVT prophylaxis:  Lovenox    CODE STATUS:    Code Status (Patient has no pulse and is not breathing): CPR (Attempt to Resuscitate)  Medical Interventions (Patient has pulse or is breathing): Full Support      Admission Status:  I believe this patient meets observation status.    Electronically signed by Chauncey Lehman Jr, MD, 01/04/23, 8:54 PM EST.

## 2023-01-05 NOTE — PLAN OF CARE
Goal Outcome Evaluation:  Plan of Care Reviewed With: patient        Progress: no change  Outcome Evaluation: New ED admission this shift. C/o frequent pain this shift, administered prn pain med as ordered. On continuous IV fluids as ordered. No new issues or needs at this time.

## 2023-01-05 NOTE — SIGNIFICANT NOTE
01/05/23 1045   Coping/Psychosocial   Observed Emotional State calm;cooperative   Verbalized Emotional State sadness;anxiety   Trust Relationship/Rapport empathic listening provided   Involvement in Care interacting with patient   Additional Documentation Spiritual Care (Group)   Spiritual Care   Use of Spiritual Resources prayer;spirituality for coping, indicated strong use of   Spiritual Care Source  initiative   Spiritual Care Follow-Up follow-up, none required as presently assessed   Response to Spiritual Care engaged in conversation;receptive of support;thanks expressed  (Pt talk about her life and she feels sacha for  her disease. She talk and cry when she talk about her life.)   Spiritual Care Interventions supportive conversation provided;prayer support provided   Spiritual Care Visit Type initial   Receptivity to Spiritual Care visit welcomed

## 2023-01-05 NOTE — CONSULTS
[facility]   UROLOGY Consult Note    Patient Name: Lidia Yeung  : 1965  MRN: [id number]  Primary Care Physician:  Rafael Mi MD  Referring Physician: No ref. provider found  Date of admission: 2023    Subjective   Subjective     Reason for Consult/ Chief Complaint: Left flank pain, nephrolithiasis    HPI:  Lidia Yeung is a 57 y.o. female history of numerous medical comorbidities including nephrolithiasis requiring intervention.  Previously established well-known patient of [facility] urology, Dr. Radford who presented with complaint of left-sided flank pain.  Patient notes progressive worsening of flank pain prior to presentation ER also reports noting gross hematuria for couple days.    Patient with numerous complaints at time of consultation including chronic significant back and neck pain status post surgical intervention for which she has full disability status; BRCA1 gene status post bilateral simple mastectomy and reconstruction, and a significant painful hemorrhoid which she states needs surgery.    Also has history of InterStim placement for management of urinary urgency placed by urogynecology.    Denies significant change in symptoms since admission.  Reports last stone intervention was approximately 1 and half years ago by Dr. Radford on the left side.    Work-up in ER via CT scan abdomen pelvis stone protocol reveals no evidence of obstructive uropathy or hydronephrosis; nonobstructing intrarenal stones, larger stone burden on right with 2 stones in the lower pole (0.3 cm and 0.6 cm) and a 0.2 cm nonobstructing left renal stone.    Of note, patient UA in ER negative for microscopic hematuria and infection.  Creatinine 0.76; WBC elevated to 11.74, recheck today was 7.83.  Initial dispo plan in ER had been to DC patient home however she was noted to have an elevated lactic acid so was admitted to hospitalist service.    Urology consulted for evaluation of her  nephrolithiasis.        Review of Systems   All systems were reviewed and negative except for: H&P and above    Personal History     Past Medical History:   Diagnosis Date   • Asthma    • Benign essential hypertension    • BRCA gene mutation positive     TO HAVE ELECTIVE BILATERAL MASTECTOMY   • Chronic lower back pain    • Cluster headache    • Colon polyp     REMOVED   • Constipation    • Depression    • Diabetes mellitus (HCC)    • Difficulty walking     USES CANE SINCE BACK SURGERY 3/14/2022   • Disc degeneration, lumbar    • Elevated fasting glucose    • Elevated transaminase level     FATTY LIVER   • Fatigue    • GERD (gastroesophageal reflux disease)    • Hemorrhoids    • Hypothyroidism    • IBS (irritable bowel syndrome)    • Kidney stones    • Memory loss    • Migraine    • Seizures (HCC)     LAST 2020, GRAND MAL   • Stomach ulcer    • KULDIP (stress urinary incontinence, female)        Past Surgical History:   Procedure Laterality Date   • BREAST RECONSTRUCTION Bilateral 9/13/2022    Procedure: Bilateral breast reconstruction with placement of implants and mesh, use of spy;  Surgeon: Ashely Rodriguez MD;  [place] [facility] [place] [facility] [place]  Service: Plastics;  Laterality: Bilateral;   • COLONOSCOPY     • ENDOSCOPY     • HYSTERECTOMY     • INTERSTIM PLACEMENT     • KIDNEY STONE SURGERY      MULTIPLE   • LUMBAR DISC SURGERY      MULTIPLE, FUSION LUMBAR SPINE 3/14/22   • MASTECTOMY Bilateral 9/13/2022    Procedure: BREAST MASTECTOMY BILATERAL;  Surgeon: Kaya Mills MD;  [place] [facility] [place] [facility] [place]  Service: General;  Laterality: Bilateral;   • ORIF HUMERAL SHAFT FRACTURE      W/IMPLANTS       Family History: family history includes Arthritis in her father, maternal grandmother, and mother; Bleeding Disorder in her mother; Brain cancer in her mother; Breast cancer in her maternal aunt; Diabetes in her maternal grandmother and mother; Hypertension in her father and mother; Migraines in her father and mother;  "Other in her father and mother; Thyroid disease in her father and mother. Otherwise pertinent FHx was reviewed and not pertinent to current issue.    Social History:  reports that she quit smoking about 7 years ago. Her smoking use included cigarettes. She has a 30.00 pack-year smoking history. She has never used smokeless tobacco. She reports current alcohol use. Drug use questions deferred to the physician.    Home Medications:  ALPRAZolam, Biotin, DULoxetine, Omega 3, Super B Complex Maxi, albuterol sulfate HFA, amitriptyline, celecoxib, fluticasone, furosemide, hydroCHLOROthiazide, ipratropium-albuterol, lansoprazole, levETIRAcetam, levothyroxine, linaclotide, lisinopril, polyethylene glycol, potassium chloride, and vitamin E    Allergies:  Allergies   Allergen Reactions   • Atorvastatin Hives   • Atorvastatin Calcium Swelling   • Ciprofloxacin Itching     FEVER,CHILLS   • Erythromycin Hives and Nausea And Vomiting   • Fentanyl Itching     FROM DURAGESIC PATCH   • Rosuvastatin Hives   • Statins Hives   • Penicillins Itching     YEAST INFECTION/\"THEY DONT SEEM TO WORK\"       Objective    Objective     Vitals:   Temp:  [97.4 °F (36.3 °C)-98.3 °F (36.8 °C)] 97.7 °F (36.5 °C)  Heart Rate:  [] 111  Resp:  [17-18] 18  BP: (138-190)/(81-95) 138/81    Physical Exam:   No acute distress, well-nourished  Sitting in chair  Lungs: Clear, unlabored  CV: Regular rate, no chest retractions  Awake alert and oriented  Mood normal; affect normal    Result Review    Result Review:  I have personally reviewed the results from the time of this admission to 1/5/2023 17:11 EST and agree with these findings:  [x]  Laboratory  [x]  Microbiology  [x]  Radiology  []  EKG/Telemetry   []  Cardiology/Vascular   [x]  Pathology  [x]  Old records  []  Other:      Assessment & Plan   Assessment / Plan     Brief Patient Summary:  Lidia Yeung is a 57 y.o. female with elevated lactate and left flank pain of unknown etiology; bilateral " nonobstructing nephrolithiasis    Active Hospital Problems:  Active Hospital Problems    Diagnosis    • **Intractable pain    • Flank pain        Plan:   CT scan imaging personally reviewed by me, discussed with patient at length; bilateral small nonobstructing intrarenal calculi.  Stone burden greater on right than left; patient with left-sided symptoms.  Per chart review, similar presentation with similar CT findings 5/2021.  Also has long history of nephrolithiasis requiring numerous interventions with [facility] and numerous medical comorbidities which could be source of her left-sided complaints.    Discussed the natural history of intrarenal stones which are typically asymptomatic.  No evidence of ureteral stone, obstructive uropathy, or hydronephrosis on CT scan.  Aware that it is these findings on CT scan which typically present with renal colic.     Also reassuring that patient was without hematuria on presentation.     Discussed also that her stones (particularly on the left side) are currently of sizes that she would may pass without intervention.  Would not recommend empiric urologic intervention at this point.    Do not believe her nonobstructing left small renal stones to be the source of her left sided complaints or elevated lactate.     States that she may not wish to continue urologic care with [facility]; encouraged her to arrange outpatient follow-up to establish care at her convenience; she offered to obtain prior records for review prior to outpatient visit which would be very helpful.    No urologic intervention indicated at this time  Continue medical management per hospitalist service  Will sign off; please to not hesitate to call with questions          Total time for encounter was 45 minutes including same day preparation prior to consultation (e.g. reviewing labs, prior notes), face to face time, and counseling     Electronically signed by Jana Levin MD, 01/05/23, 5:11 PM  EST.

## 2023-01-05 NOTE — CONSULTS
"Nutrition Services    Patient Name: Lidia Yeung  YOB: 1965  MRN: [id number]  Admission date: 1/4/2023      CLINICAL NUTRITION ASSESSMENT      Reason for Assessment  Identified at risk by screening criteria   H&P:    Past Medical History:   Diagnosis Date   • Asthma    • Benign essential hypertension    • BRCA gene mutation positive     TO HAVE ELECTIVE BILATERAL MASTECTOMY   • Chronic lower back pain    • Cluster headache    • Colon polyp     REMOVED   • Constipation    • Depression    • Diabetes mellitus (HCC)    • Difficulty walking     USES CANE SINCE BACK SURGERY 3/14/2022   • Disc degeneration, lumbar    • Elevated fasting glucose    • Elevated transaminase level     FATTY LIVER   • Fatigue    • GERD (gastroesophageal reflux disease)    • Hemorrhoids    • Hypothyroidism    • IBS (irritable bowel syndrome)    • Kidney stones    • Memory loss    • Migraine    • Seizures (HCC)     LAST 2020, GRAND MAL   • Stomach ulcer    • KULDIP (stress urinary incontinence, female)         Current Problems:   Active Hospital Problems    Diagnosis    • **Intractable pain         Nutrition/Diet History         Narrative     RD consult per nursing admission screen.  However per review of chart pt has not significant wt loss or current nutritional issues that need to be addressed.  Wt has been steady from 201-211# over the past year.  K+ is decreased at 3.2 but already on replacement therapy.  Hx of diabetes, currently on regular diet therefore based on estimated energy needs will add diabetic restriction to control BG levels.     Anthropometrics        Current Height, Weight Height: 157.5 cm (62.01\")  Weight: 93.1 kg (205 lb 4 oz)   Current BMI Body mass index is 37.53 kg/m².       Weight Hx  Wt Readings from Last 30 Encounters:   01/04/23 2342 93.1 kg (205 lb 4 oz)   01/04/23 1626 90.7 kg (200 lb)   12/21/22 1348 96.1 kg (211 lb 12.8 oz)   12/06/22 1558 94 kg (207 lb 3.2 oz)   11/21/22 1607 96.9 kg (213 lb 9.6 oz) "   10/18/22 1408 90.7 kg (200 lb)   10/03/22 1627 92.5 kg (204 lb)   09/27/22 1134 92.5 kg (204 lb)   09/21/22 1020 92.5 kg (204 lb)   09/20/22 1021 92.5 kg (204 lb)   09/13/22 1114 92.6 kg (204 lb 2.3 oz)   08/24/22 1549 91.4 kg (201 lb 9.6 oz)   08/17/22 1150 92.1 kg (203 lb)   08/02/22 1512 92.3 kg (203 lb 6.4 oz)   07/15/22 1336 91.2 kg (201 lb)   03/22/22 1639 88.1 kg (194 lb 3.2 oz)   02/13/22 1745 91.2 kg (201 lb 1 oz)   10/12/21 1633 90.3 kg (199 lb)   04/16/21 0000 86.6 kg (191 lb)   01/21/21 0000 90.3 kg (199 lb)   11/16/20 0000 91.3 kg (201 lb 4 oz)   09/16/20 0000 90.7 kg (200 lb)   08/19/20 0000 92.5 kg (204 lb)   08/05/20 0000 90.7 kg (200 lb)   07/31/20 0000 93 kg (205 lb)   07/29/20 0000 93.4 kg (206 lb)   07/02/20 0000 93.6 kg (206 lb 4 oz)   06/04/20 0000 92.3 kg (203 lb 8 oz)   05/21/20 0000 94.9 kg (209 lb 2 oz)   10/07/19 0000 94.8 kg (209 lb)   09/24/19 0000 94.3 kg (208 lb)            Wt Change Observation steady     Estimated/Assessed Needs       Energy Requirements    EST Needs (kcal/day) 5187-6259 (MSJ * 1.4-1.5 adjusted BW)       Protein Requirements    EST Daily Needs (g/day) 61 gm (1 gm Adjusted BW)       Fluid Requirements     Estimated Needs (mL/day) 1714     Labs/Medications         Pertinent Labs Reviewed.   Results from last 7 days   Lab Units 01/05/23  0650 01/04/23  1648   SODIUM mmol/L 144 140   POTASSIUM mmol/L 3.2* 3.2*   CHLORIDE mmol/L 104 99   CO2 mmol/L 25.4 22.5   BUN mg/dL 15 15   CREATININE mg/dL 0.76 0.82   CALCIUM mg/dL 9.8 10.6*   BILIRUBIN mg/dL 0.6 0.5   ALK PHOS U/L 107 126*   ALT (SGPT) U/L 66* 77*   AST (SGOT) U/L 49* 71*   GLUCOSE mg/dL 208* 234*     Results from last 7 days   Lab Units 01/05/23  0650 01/04/23  1648   MAGNESIUM mg/dL  --  1.6   HEMOGLOBIN g/dL 13.7 15.6   HEMATOCRIT % 40.6 45.2     Lab Results   Component Value Date    HGBA1C 7.00 (H) 07/15/2022       Pertinent Medications Reviewed.     Current Nutrition Orders & Evaluation of Intake       Oral  Nutrition     Current PO Diet Diet: Regular/House Diet; Texture: Regular Texture (IDDSI 7); Fluid Consistency: Thin (IDDSI 0)   Supplement No active supplement orders       Nutrition Diagnosis         Nutrition Dx Problem 1 Altered nutrition related lab values related to hx of diabetes as evidenced by Hhyperglycemia & elevated Hgb A1C     Nutrition Intervention         Add carb consistent restriction     Medical Nutrition Therapy/Nutrition Education          Learner     Readiness Patient  Education not indicated at this time     Method     Response N/A  N/A     Monitor/Evaluation        Monitor Pertinent labs     Nutrition Discharge Plan         No nutrition discharge needs identified at this time     Electronically signed by:  Janae Delgado RD  01/05/23 09:26 EST

## 2023-01-05 NOTE — PAYOR COMM NOTE
"Lidia Yeung (57 y.o. Female)     Date of Birth   1965    Social Security Number       Address   [address] [facility] [address]    Home Phone   [phone]    MRN   [id number]       Islam   Other    Marital Status                               Admission Date   1/4/23    Admission Type   Emergency    Admitting Provider   Chauncey Lehman Jr., MD    Attending Provider   Charli Barreto MD    Department, Room/Bed   [address], 4019/1       Discharge Date       Discharge Disposition       Discharge Destination                               Attending Provider: Charli Barreto MD    Allergies: Atorvastatin, Atorvastatin Calcium, Ciprofloxacin, Erythromycin, Fentanyl, Rosuvastatin, Statins, Penicillins    Isolation: None   Infection: None   Code Status: CPR    Ht: 157.5 cm (62.01\")   Wt: 93.1 kg (205 lb 4 oz)    Admission Cmt: None   Principal Problem: Intractable pain [R52]                 Active Insurance as of 1/4/2023     Primary Coverage     Payor Plan Insurance Group Employer/Plan Group    MEDICARE MEDICARE A & B      Payor Plan Address Payor Plan Phone Number Payor Plan Fax Number Effective Dates    [address] [phone]  8/1/2009 - None Entered    Formerly [facility] [id number]       Subscriber Name Subscriber Birth Date Member ID       LIDIA YEUNG 1965 [id number]           Secondary Coverage     Payor Plan Insurance Group Employer/Plan Group    HUMANA HUMANA [id number]     Payor Plan Address Payor Plan Phone Number Payor Plan Fax Number Effective Dates    [address] [address] [phone]  1/1/2016 - None Entered    Formerly [facility] [id number]       Subscriber Name Subscriber Birth Date Member ID       MASTER YEUNG AMANDA [birth year]1963 [id number]                 Emergency Contacts      (Rel.) Home Phone Work Phone Mobile Phone    Master Yeung (Spouse) [phone] -- [phone]        #[id number]     CONTACT   DOREEN S UTILIZATION REVIEW    [facility]  [address] [place] [address]  TAX ID [id number]  [facility]  [id number]       [phone]   [phone]       History & Physical      Chauncey Lehman Jr., MD at 23           [facility] HISTORY AND PHYSICAL  Date: 2023   Patient Name: Lidia Yeung  : 1965  MRN: [id number]  Primary Care Physician:  Rafael Mi MD  Date of admission: 2023    Subjective    Subjective     Chief Complaint: Back pain    HPI:    Lidia Yeung is a 57 y.o. female past medical history of diabetes mellitus, seizure disorder, chronic low back pain that presents to the emergency department for evaluation of back pain x2 weeks.  She describes it as left-sided, throbbing, constant, no known aggravating relieving factors, does not radiate.  She Diveley she had a kidney stone which was causing her pain and since it has been progressing she sought further medical attention.  In the emergency department she was found to have 2 nonobstructing kidney stones on the right and 1 on the left the greatest being 0.6 cm.  Patient received Dilaudid in the emergency department and plan was to discharge home however lactic acid returned at 4.5.  Therefore she will be admitted for ongoing monitoring and management.  Patient does endorse some subjective fever and chills along with nausea but no vomiting.  She denies any chest pain, shortness of breath, palpitations, abdominal pain, diarrhea constipation, dysuria, weakness, rash.      Personal History     Past Medical History:  Past Medical History:   Diagnosis Date   • Asthma    • Benign essential hypertension    • BRCA gene mutation positive     TO HAVE ELECTIVE BILATERAL MASTECTOMY   • Chronic lower back pain    • Cluster headache    • Colon polyp     REMOVED   • Constipation    • Depression    • Diabetes mellitus (HCC)    • Difficulty walking     USES CANE SINCE BACK SURGERY 3/14/2022   • Disc degeneration, lumbar    • Elevated fasting glucose     • Elevated transaminase level     FATTY LIVER   • Fatigue    • GERD (gastroesophageal reflux disease)    • Hemorrhoids    • Hypothyroidism    • IBS (irritable bowel syndrome)    • Kidney stones    • Memory loss    • Migraine    • Seizures (HCC)     LAST , GRAND MAL   • Stomach ulcer    • KULDIP (stress urinary incontinence, female)          Past Surgical History:  Past Surgical History:   Procedure Laterality Date   • BREAST RECONSTRUCTION Bilateral 2022    Procedure: Bilateral breast reconstruction with placement of implants and mesh, use of spy;  Surgeon: Ashely Rodriguez MD;  [place] [facility] [place] [facility];  Service: Plastics;  Laterality: Bilateral;   • COLONOSCOPY     • ENDOSCOPY     • HYSTERECTOMY     • INTERSTIM PLACEMENT     • KIDNEY STONE SURGERY      MULTIPLE   • LUMBAR DISC SURGERY      MULTIPLE, FUSION LUMBAR SPINE 3/14/22   • MASTECTOMY Bilateral 2022    Procedure: BREAST MASTECTOMY BILATERAL;  Surgeon: Kaya Mills MD;  Location: [facility];  Service: General;  Laterality: Bilateral;   • ORIF HUMERAL SHAFT FRACTURE      W/IMPLANTS         Family History:   Family History   Problem Relation Age of Onset   • Brain cancer Mother    • Arthritis Mother    • Bleeding Disorder Mother    • Diabetes Mother    • Hypertension Mother    • Other Mother         malignant neoplasm   • Migraines Mother    • Thyroid disease Mother    • Arthritis Father    • Hypertension Father    • Other Father         malignant neoplasm   • Migraines Father    • Thyroid disease Father    • Breast cancer Maternal Aunt    • Arthritis Maternal Grandmother    • Diabetes Maternal Grandmother    • Malig Hyperthermia Neg Hx          Social History:   Social History     Tobacco Use   • Smoking status: Former     Packs/day: 1.00     Years: 30.00     Pack years: 30.00     Types: Cigarettes     Quit date: 2016     Years since quittin.0   • Smokeless tobacco: Never   Vaping Use   • Vaping Use: Some days   •  "Substances: Flavoring   • Devices: Disposable, Pre-filled or refillable cartridge, Refillable tank, Pre-filled pod   • Passive vaping exposure: Yes   Substance Use Topics   • Alcohol use: Yes     Comment: occasionally   • Drug use: Defer         Home Medications:  ALPRAZolam, Biotin, Calcium Carbonate, DULoxetine, Omega 3, Super B Complex Maxi, albuterol sulfate HFA, amitriptyline, celecoxib, fluticasone, furosemide, hydroCHLOROthiazide, ipratropium-albuterol, lamoTRIgine, lansoprazole, levETIRAcetam, levothyroxine, linaclotide, lisinopril, lubiprostone, polyethylene glycol, potassium chloride, and vitamin E    Allergies:  Allergies   Allergen Reactions   • Atorvastatin Hives   • Atorvastatin Calcium Swelling   • Ciprofloxacin Itching     FEVER,CHILLS   • Erythromycin Hives and Nausea And Vomiting   • Fentanyl Itching     FROM DURAGESIC PATCH   • Rosuvastatin Hives   • Statins Hives   • Penicillins Itching     YEAST INFECTION/\"THEY DONT SEEM TO WORK\"       Review of Systems   All systems were reviewed and negative except for: Back pain, nausea    Objective    Objective     Vitals:   Temp:  [98 °F (36.7 °C)-98.4 °F (36.9 °C)] 98 °F (36.7 °C)  Heart Rate:  [120-135] 120  Resp:  [17-20] 17  BP: (131-173)/() 173/95    Physical Exam    Constitutional: Awake, alert, no acute distress   Eyes: Pupils equal, sclerae anicteric, no conjunctival injection   HENT: NCAT, mucous membranes moist   Neck: Supple, no thyromegaly, no lymphadenopathy, trachea midline   Respiratory: Clear to auscultation bilaterally, nonlabored respirations    Cardiovascular: RRR, no murmurs, rubs, or gallops, palpable pedal pulses bilaterally   Gastrointestinal: Positive bowel sounds, soft, nontender, nondistended   Musculoskeletal: No bilateral ankle edema, no clubbing or cyanosis to extremities   Psychiatric: Appropriate affect, cooperative   Neurologic: Oriented x 3, strength symmetric in all extremities, Cranial Nerves grossly intact to " confrontation, speech clear   Skin: No rashes     Result Review    Result Review:  I have personally reviewed the results from the time of this admission to 1/4/2023 20:54 EST and agree with these findings:  [x]  Laboratory  []  Microbiology  [x]  Radiology  []  EKG/Telemetry   []  Cardiology/Vascular   []  Pathology  []  Old records  []  Other:      Assessment & Plan   Assessment / Plan     Assessment/Plan:   • Intractable back pain: Admit continue to monitor.  Supportive care and pain control.  Patient can likely follow-up as outpatient for further evaluation.  No neurological deficits identified.  • Nephrolithiasis: Nonobstructing.  • Lactic acidosis: We will continue hydration overnight and monitor serial labs.  Cultures have been obtained elevated no leukocytosis and afebrile.  Will monitor off antibiotics.  • Seizure disorder: We will resume home medications once verified  • Diabetes mellitus type 2: Does not appear to be on any antidiabetic medications.  Monitor.  Check hemoglobin A1c  • Incidental lung nodule: Outpatient follow-up      DVT prophylaxis:  Lovenox    CODE STATUS:    Code Status (Patient has no pulse and is not breathing): CPR (Attempt to Resuscitate)  Medical Interventions (Patient has pulse or is breathing): Full Support      Admission Status:  I believe this patient meets observation status.    Electronically signed by Chauncey Lehman Jr, MD, 01/04/23, 8:54 PM EST.             Electronically signed by Chauncey Lehman Jr., MD at 01/04/23 2109          Emergency Department Notes      Kaylyn Enciso PA-C at 01/04/23 1633          Time: 4:33 PM EST  Date of encounter:  1/4/2023  Independent Historian/Clinical History and Information was obtained by:   Patient and Family  Chief Complaint   Patient presents with   • Flank Pain       History is limited by: N/A    History of Present Illness:  Patient is a 57 y.o. year old female who presents to the emergency department for evaluation of left flank  "pain.  The patient reports history of kidney stones and she has a CT scan scheduled for 1/15/2022.  Pain has progressively worsened and she is not able to wait until then.  Had 2 days of hematuria. Pain is severe. She has felt feverish, chills, nausea today, shaking today. She endorses mild congestion, sore throat and cough that seemed to be improving, and exposure to sick child but mostly concerned about severe left flank pain that feels like a kidney stone. Has a history of non-alcoholic steatosis (drinks one glass of wine every now and then), denies recreational drug use, and quit smoking tobacco in 2016.       History provided by:  Patient   used: No        Patient Care Team  Primary Care Provider: Rafael Mi MD    Past Medical History:     Allergies   Allergen Reactions   • Atorvastatin Hives   • Atorvastatin Calcium Swelling   • Ciprofloxacin Itching     FEVER,CHILLS   • Erythromycin Hives and Nausea And Vomiting   • Fentanyl Itching     FROM DURAGESIC PATCH   • Rosuvastatin Hives   • Statins Hives   • Penicillins Itching     YEAST INFECTION/\"THEY DONT SEEM TO WORK\"     Past Medical History:   Diagnosis Date   • Asthma    • Benign essential hypertension    • BRCA gene mutation positive     TO HAVE ELECTIVE BILATERAL MASTECTOMY   • Chronic lower back pain    • Cluster headache    • Colon polyp     REMOVED   • Constipation    • Depression    • Diabetes mellitus (HCC)    • Difficulty walking     USES CANE SINCE BACK SURGERY 3/14/2022   • Disc degeneration, lumbar    • Elevated fasting glucose    • Elevated transaminase level     FATTY LIVER   • Fatigue    • GERD (gastroesophageal reflux disease)    • Hemorrhoids    • Hypothyroidism    • IBS (irritable bowel syndrome)    • Kidney stones    • Memory loss    • Migraine    • Seizures (HCC)     LAST 2020, GRAND MAL   • Stomach ulcer    • KULDIP (stress urinary incontinence, female)      Past Surgical History:   Procedure Laterality Date   • " BREAST RECONSTRUCTION Bilateral 9/13/2022    Procedure: Bilateral breast reconstruction with placement of implants and mesh, use of spy;  Surgeon: Ashely Rodriguez MD;  Location: [facility] [place];  Service: Plastics;  Laterality: Bilateral;   • COLONOSCOPY     • ENDOSCOPY     • HYSTERECTOMY     • INTERSTIM PLACEMENT     • KIDNEY STONE SURGERY      MULTIPLE   • LUMBAR DISC SURGERY      MULTIPLE, FUSION LUMBAR SPINE 3/14/22   • MASTECTOMY Bilateral 9/13/2022    Procedure: BREAST MASTECTOMY BILATERAL;  Surgeon: Kaya Mills MD;  Location: [facility] OR [facility] [place];  Service: General;  Laterality: Bilateral;   • ORIF HUMERAL SHAFT FRACTURE      W/IMPLANTS     Family History   Problem Relation Age of Onset   • Brain cancer Mother    • Arthritis Mother    • Bleeding Disorder Mother    • Diabetes Mother    • Hypertension Mother    • Other Mother         malignant neoplasm   • Migraines Mother    • Thyroid disease Mother    • Arthritis Father    • Hypertension Father    • Other Father         malignant neoplasm   • Migraines Father    • Thyroid disease Father    • Breast cancer Maternal Aunt    • Arthritis Maternal Grandmother    • Diabetes Maternal Grandmother    • Malig Hyperthermia Neg Hx        Home Medications:  Prior to Admission medications    Medication Sig Start Date End Date Taking? Authorizing Provider   albuterol sulfate  (90 Base) MCG/ACT inhaler Inhale 2 puffs Every 4 (Four) Hours As Needed for Wheezing. 7/15/22   Rafael Mi MD   ALPRAZolam (XANAX) 2 MG tablet Take 2 mg by mouth 3 (Three) Times a Day As Needed. 7/27/22   Tana Avitia MD   amitriptyline (ELAVIL) 25 MG tablet Take 1 tablet by mouth Every Night. 7/15/22   Rafael Mi MD   B Complex-Folic Acid (Super B Complex Maxi) tablet Take  by mouth.    Tana Avitia MD   Biotin 34894 MCG tablet Take  by mouth.    Tana Avitia MD   Calcium Carbonate 1500 (600 Ca) MG tablet Take  by mouth.    Provider  MD Tana   celecoxib (CeleBREX) 200 MG capsule Take 1 capsule by mouth Daily.  Patient taking differently: Take 200 mg by mouth Daily. LAST  DOSE 9/8/22 7/15/22   Rafael Mi MD   DULoxetine (CYMBALTA) 60 MG capsule Take 1 capsule by mouth Daily.  Patient taking differently: Take 60 mg by mouth 2 (Two) Times a Day. 7/15/22   Rafael Mi MD   fluticasone (FLONASE) 50 MCG/ACT nasal spray USE 2 SPRAYS IN EACH NOSTRIL EVERY DAY . APPOINTMENT IS NEEDED BEFORE MORE REFILLS 12/8/22   Rafael Mi MD   furosemide (LASIX) 40 MG tablet TAKE 1 TABLET EVERY DAY 12/8/22   Rafael Mi MD   hydroCHLOROthiazide (HYDRODIURIL) 25 MG tablet TAKE 1 TABLET EVERY DAY 1/4/23   Mickie Samson,    ipratropium-albuterol (DUO-NEB) 0.5-2.5 mg/mL nebulizer Ipratropium-Albuterol 0.5-2.5 (3) MG/3ML Inhalation Solution; Patient Sig: Ipratropium-Albuterol 0.5-2.5 (3) MG/3ML Inhalation Solution ; 360; 0; 20-Oct-2015; Active 10/20/15   ProviderTana MD   lamoTRIgine (LaMICtal) 25 MG tablet Take 25 mg by mouth Daily. 9/15/15   ProviderTana MD   lansoprazole (PREVACID) 15 MG capsule Take 1 capsule by mouth Daily. 7/15/22   Rafael Mi MD   levETIRAcetam (KEPPRA) 750 MG tablet TAKE 1 TABLET TWICE DAILY 11/21/22   Rafael Mi MD   levothyroxine (SYNTHROID, LEVOTHROID) 50 MCG tablet Take 1 tablet by mouth Daily.  Patient taking differently: Take 50 mcg by mouth Every Morning. 7/15/22   Rafael Mi MD   linaclotide (LINZESS) 290 MCG capsule capsule Take 1 capsule by mouth Every Morning Before Breakfast. 7/15/22   Rafael Mi MD   lisinopril (PRINIVIL,ZESTRIL) 20 MG tablet Take 1 tablet by mouth Daily.  Patient taking differently: Take 20 mg by mouth Daily. INST PER ANESTHESIA PROTOCOL 7/15/22   Rafael Mi MD   lubiprostone (AMITIZA) 24 MCG capsule Take 24 mcg by mouth. 7/15/22   Provider, MD Tana   Omega 3 1000 MG capsule Take  by mouth.     ProviderTana MD   polyethylene glycol (MIRALAX) 17 GM/SCOOP powder Take 17 g by mouth.    Tana Avitia MD   potassium chloride (K-DUR,KLOR-CON) 20 MEQ CR tablet Take 1 tablet by mouth 2 (Two) Times a Day. 7/15/22   Rafael Mi MD   vitamin E 400 UNIT capsule Take 400 Units by mouth Daily.    Tana Avitia MD   hydroCHLOROthiazide (HYDRODIURIL) 25 MG tablet Take 1 tablet by mouth Daily.  Patient taking differently: Take 25 mg by mouth Daily. INST PER ANESTHESIA PROTOCOL 7/15/22 1/4/23  Rafael Mi MD        Social History:   Social History     Tobacco Use   • Smoking status: Former     Packs/day: 1.00     Years: 30.00     Pack years: 30.00     Types: Cigarettes     Quit date: 2016     Years since quittin.0   • Smokeless tobacco: Never   Vaping Use   • Vaping Use: Some days   • Substances: Flavoring   • Devices: Disposable, Pre-filled or refillable cartridge, Refillable tank, Pre-filled pod   • Passive vaping exposure: Yes   Substance Use Topics   • Alcohol use: Yes     Comment: occasionally   • Drug use: Defer         Review of Systems:  Review of Systems   Constitutional: Positive for chills and fever (subjective).   HENT: Positive for congestion and sore throat.    Respiratory: Positive for cough. Negative for shortness of breath.    Cardiovascular: Negative for chest pain.   Gastrointestinal: Positive for nausea. Negative for abdominal pain, diarrhea and vomiting.        Negative for bowel incontinence   Genitourinary: Positive for flank pain and hematuria. Negative for dysuria.        Negative for bladder incontinence   Musculoskeletal: Positive for back pain.   Skin: Negative for rash.   Neurological: Negative for weakness and numbness.        Negative for saddle anesthesia   All other systems reviewed and are negative.       Physical Exam:  BP (!) 190/90 (BP Location: Left arm, Patient Position: Lying)   Pulse 108   Temp 98.3 °F (36.8 °C) (Oral)   Resp  "18   Ht 157.5 cm (62.01\")   Wt 93.1 kg (205 lb 4 oz)   SpO2 96%   BMI 37.53 kg/m²     Physical Exam  Vitals and nursing note reviewed.   Constitutional:       Appearance: Normal appearance. She is ill-appearing and diaphoretic. She is not toxic-appearing.   HENT:      Head: Normocephalic.      Nose: Nose normal.      Mouth/Throat:      Mouth: Mucous membranes are moist.   Eyes:      Conjunctiva/sclera: Conjunctivae normal.      Pupils: Pupils are equal, round, and reactive to light.   Cardiovascular:      Rate and Rhythm: Regular rhythm. Tachycardia present.   Pulmonary:      Effort: Pulmonary effort is normal.      Breath sounds: Normal breath sounds.   Abdominal:      General: There is no distension.      Tenderness: There is left CVA tenderness. There is no guarding or rebound.   Musculoskeletal:         General: Normal range of motion.      Cervical back: Normal range of motion and neck supple.   Skin:     General: Skin is warm.      Capillary Refill: Capillary refill takes less than 2 seconds.   Neurological:      General: No focal deficit present.      Mental Status: She is alert and oriented to person, place, and time.   Psychiatric:         Mood and Affect: Mood normal.         Behavior: Behavior normal.                 Procedures:  Procedures      Medical Decision Making:      Comorbidities that affect care:    seizures, Hypertension, Thyroid Disease    External Notes reviewed:    Previous Admission Note      The following orders were placed and all results were independently analyzed by me:  Orders Placed This Encounter   Procedures   • COVID-19,APTIMA PANTHER(YASEMIN), BILL/ ANISH, NP/OP SWAB IN UTM/VTM/SALINE TRANSPORT MEDIA,24 HR TAT - Swab, Nasal Cavity   • Influenza Antigen, Rapid - Swab, Nasopharynx   • Blood Culture - Blood,   • Blood Culture - Blood,   • CT Abdomen Pelvis Stone Protocol   • XR Chest 1 View   • [place] Draw   • Comprehensive Metabolic Panel   • Lipase   • Urinalysis With " Microscopic If Indicated (No Culture) - Urine, Clean Catch   • Lactic Acid, Plasma   • CBC Auto Differential   • Scan Slide   • Magnesium   • Urinalysis, Microscopic Only - Urine, Clean Catch   • STAT Lactic Acid, Reflex   • STAT Lactic Acid, Reflex   • Levetiracetam Level (Keppra)   • CBC Auto Differential   • Comprehensive Metabolic Panel   • Hemoglobin A1c   • STAT Lactic Acid, Reflex   • STAT Lactic Acid, Reflex   • Diet: Regular/House Diet; Texture: Regular Texture (IDDSI 7); Fluid Consistency: Thin (IDDSI 0)   • Vital Signs   • Vital Signs   • Intake & Output   • Weigh Patient   • Oral Care   • Saline Lock & Maintain IV Access   • Activity - Ad Fatoumata   • Notify Provider (With Default Parameters)   • Code Status and Medical Interventions:   • Inpatient Hospitalist Consult   • Inpatient Case Management  Consult   • Inpatient Nutrition Consult   • Oxygen Therapy- Nasal Cannula; Titrate for SPO2: 90% - 95%   • Insert Peripheral IV   • Insert Peripheral IV   • Initiate Observation Status   • CBC & Differential   • Green Top (Gel)   • Lavender Top   • Gold Top - SST   • Light Blue Top       Medications Given in the Emergency Department:  Medications   sodium chloride 0.9 % flush 10 mL (has no administration in time range)   albuterol sulfate HFA (PROVENTIL HFA;VENTOLIN HFA;PROAIR HFA) inhaler 2 puff (has no administration in time range)   sodium chloride 0.9 % flush 10 mL (has no administration in time range)   sodium chloride 0.9 % flush 10 mL (10 mL Intravenous Given 1/5/23 0132)   sodium chloride 0.9 % infusion 40 mL (has no administration in time range)   Enoxaparin Sodium (LOVENOX) syringe 40 mg (has no administration in time range)   lactated ringers infusion (100 mL/hr Intravenous New Bag 1/5/23 0132)   acetaminophen (TYLENOL) tablet 650 mg (has no administration in time range)     Or   acetaminophen (TYLENOL) 160 MG/5ML solution 650 mg (has no administration in time range)     Or   acetaminophen  (TYLENOL) suppository 650 mg (has no administration in time range)   HYDROmorphone (DILAUDID) injection 0.5 mg (0.5 mg Intravenous Given 1/5/23 0131)     And   naloxone (NARCAN) injection 0.4 mg (has no administration in time range)   ondansetron (ZOFRAN) injection 4 mg (has no administration in time range)   sodium chloride 0.9 % bolus 1,000 mL (0 mL Intravenous Stopped 1/4/23 2011)   ondansetron ODT (ZOFRAN-ODT) disintegrating tablet 4 mg (4 mg Oral Given 1/4/23 1720)   HYDROmorphone (DILAUDID) injection 1 mg (1 mg Intravenous Given 1/4/23 1720)   potassium chloride (MICRO-K) CR capsule 40 mEq (40 mEq Oral Given 1/4/23 1848)   ketorolac (TORADOL) injection 15 mg (15 mg Intravenous Given 1/4/23 1942)   lactated ringers bolus 1,000 mL (1,000 mL Intravenous Handoff 1/4/23 2305)        ED Course:    The patient was initially evaluated in the triage area where orders were placed. The patient was later dispositioned by Kaylyn Enciso PA-C.      The patient was advised to stay for completion of workup which includes but is not limited to communication of labs and radiological results, reassessment and plan. The patient was advised that leaving prior to disposition by a provider could result in critical findings that are not communicated to the patient.          Labs:    Lab Results (last 24 hours)     Procedure Component Value Units Date/Time    CBC & Differential [[id number]]  (Abnormal) Collected: 01/04/23 1648    Specimen: Blood Updated: 01/04/23 1717    Narrative:      The following orders were created for panel order CBC & Differential.  Procedure                               Abnormality         Status                     ---------                               -----------         ------                     CBC Auto Differential[[id number]]        Abnormal            Final result               Scan Slide[[id number]]                                       Final result                 Please view results for these  tests on the individual orders.    Comprehensive Metabolic Panel [[id number]]  (Abnormal) Collected: 01/04/23 1648    Specimen: Blood Updated: 01/04/23 1722     Glucose 234 mg/dL      BUN 15 mg/dL      Creatinine 0.82 mg/dL      Sodium 140 mmol/L      Potassium 3.2 mmol/L      Comment: Slight hemolysis detected by analyzer. Results may be affected.        Chloride 99 mmol/L      CO2 22.5 mmol/L      Calcium 10.6 mg/dL      Total Protein 8.1 g/dL      Albumin 4.9 g/dL      ALT (SGPT) 77 U/L      AST (SGOT) 71 U/L      Alkaline Phosphatase 126 U/L      Total Bilirubin 0.5 mg/dL      Globulin 3.2 gm/dL      A/G Ratio 1.5 g/dL      BUN/Creatinine Ratio 18.3     Anion Gap 18.5 mmol/L      eGFR 83.0 mL/min/1.73      Comment: National [employer] Foundation and American Society of Nephrology (ASN) Task Force recommended calculation based on the Chronic Kidney Disease Epidemiology Collaboration (CKD-EPI) equation refit without adjustment for race.       Narrative:      GFR Normal >60  Chronic Kidney Disease <60  Kidney Failure <15      Lipase [[id number]]  (Normal) Collected: 01/04/23 1648    Specimen: Blood Updated: 01/04/23 1722     Lipase 29 U/L     CBC Auto Differential [[id number]]  (Abnormal) Collected: 01/04/23 1648    Specimen: Blood Updated: 01/04/23 1717     WBC 11.74 10*3/mm3      RBC 5.07 10*6/mm3      Hemoglobin 15.6 g/dL      Hematocrit 45.2 %      MCV 89.2 fL      MCH 30.8 pg      MCHC 34.5 g/dL      RDW 13.8 %      RDW-SD 43.7 fl      MPV 10.7 fL      Platelets 165 10*3/mm3      Neutrophil % 81.5 %      Lymphocyte % 11.1 %      Monocyte % 5.0 %      Eosinophil % 1.3 %      Basophil % 0.7 %      Immature Grans % 0.4 %      Neutrophils, Absolute 9.57 10*3/mm3      Lymphocytes, Absolute 1.30 10*3/mm3      Monocytes, Absolute 0.59 10*3/mm3      Eosinophils, Absolute 0.15 10*3/mm3      Basophils, Absolute 0.08 10*3/mm3      Immature Grans, Absolute 0.05 10*3/mm3      nRBC 0.0 /100 WBC     Scan Slide [[id number]]  Collected: 01/04/23 1648    Specimen: Blood Updated: 01/04/23 1717     Dacrocytes Slight/1+     Macrocytes Slight/1+     Target Cells Slight/1+     WBC Morphology Normal     Platelet Estimate Adequate     Large Platelets Slight/1+    Magnesium [[id number]]  (Normal) Collected: 01/04/23 1648    Specimen: Blood Updated: 01/04/23 1744     Magnesium 1.6 mg/dL     Urinalysis With Microscopic If Indicated (No Culture) - Urine, Clean Catch [[id number]]  (Abnormal) Collected: 01/04/23 1724    Specimen: Urine, Clean Catch Updated: 01/04/23 1749     Color, UA Yellow     Appearance, UA Clear     pH, UA 7.0     Specific Gravity, UA 1.008     Glucose, UA Negative     Ketones, UA Negative     Bilirubin, UA Negative     Blood, UA Negative     Protein,  mg/dL (2+)     Leuk Esterase, UA Negative     Nitrite, UA Negative     Urobilinogen, UA 0.2 E.U./dL    Lactic Acid, Plasma [[id number]]  (Abnormal) Collected: 01/04/23 1724    Specimen: Blood Updated: 01/04/23 1813     Lactate 4.3 mmol/L     Urinalysis, Microscopic Only - Urine, Clean Catch [[id number]]  (Abnormal) Collected: 01/04/23 1724    Specimen: Urine, Clean Catch Updated: 01/04/23 1749     RBC, UA 0-2 /HPF      WBC, UA 0-2 /HPF      Bacteria, UA None Seen /HPF      Squamous Epithelial Cells, UA 0-2 /HPF      Hyaline Casts, UA 0-2 /LPF      Methodology Automated Microscopy    STAT Lactic Acid, Reflex [[id number]]  (Abnormal) Collected: 01/04/23 1855    Specimen: Blood Updated: 01/04/23 1926     Lactate 2.2 mmol/L     COVID-19,APTIMA PANTHER(YASEMIN),[facility] BILL/[facility] ANISH, NP/OP SWAB IN UTM/VTM/[facility] [employer]SPORT MEDIA,24 HR TAT - Swab, Nasal Cavity [[id number]]  (Normal) Collected: 01/04/23 1855    Specimen: Swab from Nasal Cavity Updated: 01/05/23 0008     COVID19 Not Detected    Narrative:      Fact sheet for providers: https://www.fda.gov/media/737451/download     Fact sheet for patients: https://www.fda.gov/media/624985/download    Test performed by RT PCR.    Influenza Antigen, Rapid  - Swab, Nasopharynx [[id number]]  (Normal) Collected: 01/04/23 1855    Specimen: Swab from Nasopharynx Updated: 01/04/23 1944     Influenza A Ag, EIA Negative     Influenza B Ag, EIA Negative    STAT Lactic Acid, Reflex [[id number]]  (Abnormal) Collected: 01/04/23 2139    Specimen: Blood Updated: 01/04/23 2211     Lactate 2.9 mmol/L     Levetiracetam Level (Keppra) [[id number]] Collected: 01/04/23 2139    Specimen: Blood Updated: 01/04/23 2147    Blood Culture - Blood, Hand, Right [[id number]] Collected: 01/04/23 2139    Specimen: Blood from Hand, Right Updated: 01/04/23 2147    Blood Culture - Blood, Hand, Left [[id number]] Collected: 01/04/23 2139    Specimen: Blood from Hand, Left Updated: 01/04/23 2148    STAT Lactic Acid, Reflex [[id number]]  (Abnormal) Collected: 01/05/23 0054    Specimen: Blood Updated: 01/05/23 0129     Lactate 2.1 mmol/L     Hemoglobin A1c [[id number]] Collected: 01/05/23 0054    Specimen: Blood Updated: 01/05/23 0100           Imaging:    XR Chest 1 View    Result Date: 1/4/2023  PROCEDURE: XR CHEST 1 VW  COMPARISON: 5/20/2018.  INDICATIONS: fever, congestion  FINDINGS: A single frontal (AP or PA upright portable) chest radiograph reveals no cardiac enlargement.  There may be new atelectasis and/or infiltrate(s) in the bilateral lung bases.  Pneumonia cannot be excluded.  Atelectasis alone would be in the differential diagnosis.  Artifact is possible.  No pneumothorax is seen.  Postoperative changes involve the cervical spine and the left humerus.  Surgical clips are thought to be projected over the right lateral chest wall, versus external artifact. The thoracic aorta is atherosclerotic.  Chronic calcified granulomatous disease involves the chest.        New atelectasis and/or infiltrate(s) is (are) suggested in the lung bases and may represent atelectasis alone.  Pneumonia cannot be excluded.     Please note that portions of this note were completed with a voice recognition program.  LUCIA SMITH  YAJAIRA DAS MD       Electronically Signed and Approved By: LUCIA GATES JR, MD on 1/04/2023 at 21:44              CT Abdomen Pelvis Stone Protocol    Result Date: 1/4/2023  PROCEDURE: CT ABDOMEN PELVIS STONE PROTOCOL  COMPARISON: [facility], CT, ABDOMEN/PELVIS WITH CONTRAST, 6/07/2018, 12:58.  INDICATIONS: Flank pain, kidney stone suspected  TECHNIQUE: CT images were created without intravenous contrast.   PROTOCOL:   Standard imaging protocol performed    RADIATION:   DLP: 864.2mGy*cm   Automated exposure control was utilized to minimize radiation dose.  FINDINGS:  In the right lower lobe is a 0.4 cm nodule, new since 6/7/2018.  The liver demonstrates diffuse low density consistent with steatosis.  No focal hepatic lesion is seen.  The spleen, pancreas and adrenal glands appear unremarkable.  There are 2 nonobstructing right renal stones measuring 0.3 cm and 0.6 cm.  A 0.2 cm nonobstructing left renal stone is noted.  No ureteral stone is identified, ever, portions of the ureters are obscured by streak artifact related to the patient's previous lumbar spine fusion.  Moderate atherosclerotic calcification is noted in the abdominal aorta and its major branches.  No adenopathy or free fluid is seen in the abdomen or pelvis.  A hysterectomy has been performed.  The adnexal regions appear unremarkable.  No acute bowel abnormality is identified.  The lack of oral contrast limits evaluation of the bowel.  Small fat containing bilateral inguinal hernias are noted.  In the T12 vertebral body is a 2.1 cm lytic focus which is chronic, compatible with a hemangioma.  There is been previous fusion of L3 through S2.        1. Indeterminate 0.4 cm nodule in the right lower lobe.The findings include a single, incidentally detected, solid pulmonary nodule, measuring less than 6mm.  2017 guidelines from the Fleischner Society for the follow-up and management of incidentally detected indeterminate pulmonary  nodules in persons at least 35 years of age depend on nodule size (average length and width) and underlying risk factors (including smoking and other risk factors). Please consider the following recommendations after clinical assessment of risk factors.  For <6mm solid nodules: In low risk patients, no follow-up required.  If suspicious morphology or upper lobe location, consider 12 month follow-up.  In high risk patients, optional CT in 12 months.  2. Diffuse hepatic steatosis 3. Previous hysterectomy 4. Bilateral nonobstructing renal stones, as above 5. No ureteral stone identified.  Portions of the ureters are obscured, as above 6. Small bilateral fat containing inguinal hernias     Walter Macias M.D.       Electronically Signed and Approved By: Walter Macias M.D. on 1/04/2023 at 17:23                 Differential Diagnosis and Discussion:      Abdominal Pain: Based on the patient's signs and symptoms, I considered abdominal aortic aneurysm, small bowel obstruction, pancreatitis, acute cholecystitis, acute appendecitis, peptic ulcer disease, gastritis, colitis, endocrine disorders, irritable bowel syndrome and other differential diagnosis an etiology of the patient's abdominal pain.    All labs were reviewed and analyzed by me.  CT scan radiology interpretation was reviewed by me.    [facility]         Patient Care Considerations:    None      Consultants/Shared Management Plan:    Hospitalist: I have discussed the case with Dr. Lehman who agrees to accept the patient for admission.    Social Determinants of Health:    Patient is independent, reliable, and has access to care.       Disposition and Care Coordination:    Admit:   Through independent evaluation of the patient's history, physical, and imperical data, the patient meets criteria for observation/admission to the hospital.    Patient with prior history of kidney stones with presentation similar to prior.  She has continued lactic acidosis despite fluids. CT  shows intrarenal nonobstructing stones, no evidence ureteral stone but does have artifact from lumbar fusion. I have discussed incidental finding of pulmonary nodule and recommendation for repeat CT in 12 months. Flu is negative. Consult with Dr. Lehman who is in agreement with admission.     Final diagnoses:   Flank pain   Intractable pain        ED Disposition     ED Disposition   Decision to Admit    Condition   --    Comment   Level of Care: Med/Surg [1]   Diagnosis: Intractable pain [[id number]]               This medical record created using voice recognition software.           Kaylyn Enciso PA-C  01/05/23 0227      Electronically signed by Kaylyn Enciso PA-C at 01/05/23 0227       Physician Progress Notes (last 24 hours)  Notes from 01/04/23 1210 through 01/05/23 1210   No notes of this type exist for this encounter.         Consult Notes (last 24 hours)  Notes from 01/04/23 1210 through 01/05/23 1210   No notes of this type exist for this encounter.       Dilaudid iv x 5 since admit

## 2023-01-06 PROBLEM — E87.20 LACTIC ACIDOSIS: Status: RESOLVED | Noted: 2023-01-01 | Resolved: 2023-01-01

## 2023-01-06 PROBLEM — R52 INTRACTABLE PAIN: Status: RESOLVED | Noted: 2023-01-01 | Resolved: 2023-01-01

## 2023-01-06 PROBLEM — E87.20 LACTIC ACIDOSIS: Status: ACTIVE | Noted: 2023-01-01

## 2023-01-06 NOTE — PLAN OF CARE
Goal Outcome Evaluation:  Plan of Care Reviewed With: patient        Progress: no change  Outcome Evaluation: Pt c/o pain/discomfort this shift, administered prn pain meds as ordered. Pt c/o feeling anxious and pruitis this shift, administered prn anxiety and itch meds as ordered. On continuous IV fluids as ordered. No new issues or needs at this time.

## 2023-01-06 NOTE — NURSING NOTE
Arrived to patient's room to complete wound consult. Primary RN states that patient has no drains and that everything is closed and healed. No consult is needed at present time and should have been cancelled. Requested that consult please be cancelled.   Chuyita Linares RN

## 2023-01-06 NOTE — DISCHARGE SUMMARY
[facility]        HOSPITALIST  DISCHARGE SUMMARY    Patient Name: Lidia Yeung  : 1965  MRN: [id number]    Date of Admission: 2023  Date of Discharge:  2023  Primary Care Physician: Rafael Mi MD    Consults     Date and Time Order Name Status Description    2023  3:43 PM Inpatient Urology Consult Completed     2023  8:28 PM Inpatient Hospitalist Consult            Active and Resolved Hospital Problems:  Active Hospital Problems    Diagnosis POA   • Flank pain [R10.9] Yes      Resolved Hospital Problems    Diagnosis POA   • **Intractable pain [R52] Yes   • Lactic acidosis [E87.20] Yes     Recurrent lactic acidosis.  Etiology not clear.  Resolved  Left flank pain.  Chronic pain syndrome.  Bilateral nonobstructing renal stone.  2 on the right 1 in left.  0.4 cm right lower lobe pulmonary nodule.  Leukocytosis.  Resolved.  Diabetes mellitus diet controlled.  Hypertension.  History of asthma.  Stable.  Peptic ulcer disease.  IBS.  S/p bilateral mastectomy.  S/p InterStim placement.  Bibasilar infiltrate likely atelectasis  Hospital Course     Hospital Course:  Lidia Yeung is a 57 y.o. female past medical history of diabetes mellitus, seizure disorder, chronic low back pain that presents to the emergency department for evaluation of back pain x2 weeks.  She describes it as left-sided, throbbing, constant, no known aggravating relieving factors, does not radiate.  She [place] she had a kidney stone which was causing her pain and since it has been progressing she sought further medical attention.  In the emergency department she was found to have 2 nonobstructing kidney stones on the right and 1 on the left the greatest being 0.6 cm.  Patient received Dilaudid in the emergency department and plan was to discharge home however lactic acid returned at 4.5.  Therefore she will be admitted for ongoing monitoring and management.  Patient does endorse some subjective  fever and chills along with nausea but no vomiting.  She denies any chest pain, shortness of breath, palpitations, abdominal pain, diarrhea constipation, dysuria, weakness, rash.  Lactic acidosis resolved then repeat trended up again, discharge canceled and really started IV fluid     Interval Followup:    Blood pressure up.  On room air.  Patient sleepy today  Tachycardia better.  No fever.  Complaining of left flank pain some radiation anteriorly to the groin area.  Per patient she has weaned her self off from opioids as she got addicted to it.  No urinary complaints.  No cough or phlegm  Patient very anxious as per nursing staff.  Patient cleared by urology for no intervention.  Patient had seen GI in the past.  Tolerating diet well.  Stable for discharge home.  CBC and CMP within normal range.        DISCHARGE Follow Up Recommendations for labs and diagnostics: PCP follow-up nodule 0.4 cm right lower lobe..  CT scan of chest in about 6 to 12 months.  Follow-up with urology.  If pain persist follow-up with GI for possible scopes.      Day of Discharge     Vital Signs:  Temp:  [97.7 °F (36.5 °C)-98.2 °F (36.8 °C)] 97.7 °F (36.5 °C)  Heart Rate:  [] 88  Resp:  [16-18] 16  BP: (127-152)/(67-86) 137/72    Physical Exam:     Constitutional: Awake, alert, no acute distress              Eyes: Pupils equal, sclerae anicteric, no conjunctival injection              HENT: NCAT, mucous membranes moist              Neck: Supple, no thyromegaly, no lymphadenopathy, trachea midline              Respiratory: Clear to auscultation bilaterally, nonlabored respirations               Cardiovascular: RRR, no murmurs, rubs, or gallops, palpable pedal pulses bilaterally              Gastrointestinal: Positive bowel sounds, soft, tender lower left flank area, nondistended              Musculoskeletal: No bilateral ankle edema, no clubbing or cyanosis to extremities              Psychiatric: Appropriate affect,  cooperative              Neurologic: Oriented x 3, strength symmetric in all extremities, Cranial Nerves grossly intact to confrontation, speech clear              Skin: No rashes      Discharge Details        Discharge Medications      Changes to Medications      Instructions Start Date   celecoxib 200 MG capsule  Commonly known as: CeleBREX  What changed: additional instructions   200 mg, Oral, Daily      DULoxetine 60 MG capsule  Commonly known as: CYMBALTA  What changed: when to take this   60 mg, Oral, Daily      fluticasone 50 MCG/ACT nasal spray  Commonly known as: FLONASE  What changed: See the new instructions.   USE 2 SPRAYS IN EACH NOSTRIL EVERY DAY . APPOINTMENT IS NEEDED BEFORE MORE REFILLS      levothyroxine 50 MCG tablet  Commonly known as: SYNTHROID, LEVOTHROID  What changed: when to take this   50 mcg, Oral, Daily      lisinopril 20 MG tablet  Commonly known as: PRINIVILZESTRIL  What changed: additional instructions   20 mg, Oral, Daily         Continue These Medications      Instructions Start Date   albuterol sulfate  (90 Base) MCG/ACT inhaler  Commonly known as: PROVENTIL HFA;VENTOLIN HFA;PROAIR HFA   2 puffs, Inhalation, Every 4 Hours PRN      ALPRAZolam 2 MG tablet  Commonly known as: XANAX   2 mg, Oral, 3 Times Daily PRN      amitriptyline 25 MG tablet  Commonly known as: ELAVIL   25 mg, Oral, Nightly      Biotin 38557 MCG tablet   Oral      furosemide 40 MG tablet  Commonly known as: LASIX   TAKE 1 TABLET EVERY DAY      hydroCHLOROthiazide 25 MG tablet  Commonly known as: HYDRODIURIL   TAKE 1 TABLET EVERY DAY      ipratropium-albuterol 0.5-2.5 mg/3 ml nebulizer  Commonly known as: DUO-NEB   Ipratropium-Albuterol 0.5-2.5 (3) MG/3ML Inhalation Solution; Patient Sig: Ipratropium-Albuterol 0.5-2.5 (3) MG/3ML Inhalation Solution ; 360; 0; 20-Oct-2015; Active      lansoprazole 15 MG capsule  Commonly known as: PREVACID   15 mg, Oral, Daily      levETIRAcetam 750 MG tablet  Commonly known  "as: KEPPRA   TAKE 1 TABLET TWICE DAILY      linaclotide 290 MCG capsule capsule  Commonly known as: LINZESS   290 mcg, Oral, Every Morning Before Breakfast      Omega 3 1000 MG capsule   1,000 mg, Oral, Daily      polyethylene glycol 17 GM/SCOOP powder  Commonly known as: MIRALAX   17 g, Oral      potassium chloride 20 MEQ CR tablet  Commonly known as: K-DUR,KLOR-CON   20 mEq, Oral, 2 Times Daily      Super B Complex Maxi tablet   Oral      vitamin E 400 UNIT capsule   400 Units, Oral, Daily             Allergies   Allergen Reactions   • Atorvastatin Hives   • Atorvastatin Calcium Swelling   • Ciprofloxacin Itching     FEVER,CHILLS   • Erythromycin Hives and Nausea And Vomiting   • Fentanyl Itching     FROM DURAGESIC PATCH   • Rosuvastatin Hives   • Statins Hives   • Penicillins Itching     YEAST INFECTION/\"THEY DONT SEEM TO WORK\"       Discharge Disposition:  Home or Self Care.  In private vehicle with family member    Diet:  Diet Instructions     Advance Diet As Tolerated            Discharge Activity:   Activity Instructions     Activity as Tolerated      Gradually Increase Activity Until at Pre-Hospitalization Level            CODE STATUS:  Code Status and Medical Interventions:   Ordered at: 01/04/23 2054     Code Status (Patient has no pulse and is not breathing):    CPR (Attempt to Resuscitate)     Medical Interventions (Patient has pulse or is breathing):    Full Support         Future Appointments   Date Time Provider Department Center   1/18/2023  3:45 PM [facility] DUSTIN CT 1 [facility] ETWCT [facility]   3/14/2023  4:15 PM Ana Cristina Shah APRN [facility] DUSTIN [facility]       Additional Instructions for the Follow-ups that You Need to Schedule     Discharge Follow-up with PCP   As directed       Currently Documented PCP:    Rafael Mi MD    PCP Phone Number:    [phone]     Follow Up Details: 4 days         Discharge Follow-up with Specialty: Urology.  Dr. Radford   As directed      Specialty: Urology.  Dr. Radford         "       Pertinent  and/or Most Recent Results     PROCEDURES:   * Cannot find OR case *     LAB RESULTS:      Lab 01/06/23  0613 01/05/23  1308 01/05/23  0650 01/05/23 0054 01/04/23  2139 01/04/23  1724 01/04/23  1648   WBC  --   --  7.83  --   --   --  11.74*   HEMOGLOBIN  --   --  13.7  --   --   --  15.6   HEMATOCRIT  --   --  40.6  --   --   --  45.2   PLATELETS  --   --  234  --   --   --  165   NEUTROS ABS  --   --  5.76  --   --   --  9.57*   IMMATURE GRANS (ABS)  --   --  0.03  --   --   --  0.05   LYMPHS ABS  --   --  1.45  --   --   --  1.30   MONOS ABS  --   --  0.48  --   --   --  0.59   EOS ABS  --   --  0.08  --   --   --  0.15   MCV  --   --  91.4  --   --   --  89.2   PROCALCITONIN  --   --  0.09  --   --   --   --    LACTATE 1.2 4.8* 2.2* 2.1* 2.9*   < >  --     < > = values in this interval not displayed.         Lab 01/06/23  0613 01/05/23  0650 01/05/23 0054 01/04/23  1648   SODIUM 140 144  --  140   POTASSIUM 3.7 3.2*  --  3.2*   CHLORIDE 103 104  --  99   CO2 28.4 25.4  --  22.5   ANION GAP 8.6 14.6  --  18.5*   BUN 13 15  --  15   CREATININE 0.73 0.76  --  0.82   EGFR 95.5 91.0  --  83.0   GLUCOSE 170* 208*  --  234*   CALCIUM 9.7 9.8  --  10.6*   MAGNESIUM  --   --   --  1.6   HEMOGLOBIN A1C  --   --  7.90*  --          Lab 01/06/23  0613 01/05/23  0650 01/04/23  1648   TOTAL PROTEIN 6.6 7.6 8.1   ALBUMIN 4.2 4.8 4.9   GLOBULIN 2.4 2.8 3.2   ALT (SGPT) 52* 66* 77*   AST (SGOT) 39* 49* 71*   BILIRUBIN 0.4 0.6 0.5   ALK PHOS 100 107 126*   LIPASE  --   --  29                     Brief Urine Lab Results  (Last result in the past 365 days)      Color   Clarity   Blood   Leuk Est   Nitrite   Protein   CREAT   Urine HCG        01/04/23 1724 Yellow   Clear   Negative   Negative   Negative   100 mg/dL (2+)               Microbiology Results (last 10 days)     Procedure Component Value - Date/Time    Blood Culture - Blood, Hand, Right [[id number]]  (Normal) Collected: 01/04/23 2139    Lab Status:  Preliminary result Specimen: Blood from Hand, Right Updated: 01/05/23 2200     Blood Culture No growth at 24 hours    Blood Culture - Blood, Hand, Left [[id number]]  (Normal) Collected: 01/04/23 2139    Lab Status: Preliminary result Specimen: Blood from Hand, Left Updated: 01/05/23 2200     Blood Culture No growth at 24 hours    COVID-19,APTIMA PANTHER(YASEMIN),BH BILL/BH ANISH, NP/OP SWAB IN UTM/VTM/SALINE TRANSPORT MEDIA,24 HR TAT - Swab, Nasal Cavity [[id number]]  (Normal) Collected: 01/04/23 1855    Lab Status: Final result Specimen: Swab from Nasal Cavity Updated: 01/05/23 0008     COVID19 Not Detected    Narrative:      Fact sheet for providers: https://www.fda.gov/media/891672/download     Fact sheet for patients: https://www.fda.gov/media/262537/download    Test performed by RT PCR.    Influenza Antigen, Rapid - Swab, Nasopharynx [[id number]]  (Normal) Collected: 01/04/23 1855    Lab Status: Final result Specimen: Swab from Nasopharynx Updated: 01/04/23 1944     Influenza A Ag, EIA Negative     Influenza B Ag, EIA Negative          XR Chest 1 View    Result Date: 1/4/2023  Impression:  New atelectasis and/or infiltrate(s) is (are) suggested in the lung bases and may represent atelectasis alone.  Pneumonia cannot be excluded.     Please note that portions of this note were completed with a voice recognition program.  LUCIA GATES JR, MD       Electronically Signed and Approved By: LUCIA GATES JR, MD on 1/04/2023 at 21:44              CT Abdomen Pelvis Stone Protocol    Result Date: 1/4/2023  Impression:   1. Indeterminate 0.4 cm nodule in the right lower lobe.The findings include a single, incidentally detected, solid pulmonary nodule, measuring less than 6mm.  2017 guidelines from the Fleischner Society for the follow-up and management of incidentally detected indeterminate pulmonary nodules in persons at least 35 years of age depend on nodule size (average length and width) and underlying risk factors  (including smoking and other risk factors). Please consider the following recommendations after clinical assessment of risk factors.  For <6mm solid nodules: In low risk patients, no follow-up required.  If suspicious morphology or upper lobe location, consider 12 month follow-up.  In high risk patients, optional CT in 12 months.  2. Diffuse hepatic steatosis 3. Previous hysterectomy 4. Bilateral nonobstructing renal stones, as above 5. No ureteral stone identified.  Portions of the ureters are obscured, as above 6. Small bilateral fat containing inguinal hernias     Walter Macias M.D.       Electronically Signed and Approved By: Walter Macias M.D. on 1/04/2023 at 17:23                           Imaging Results (All)     Procedure Component Value Units Date/Time    XR Chest 1 View [[id number]] Collected: 01/04/23 2144     Updated: 01/04/23 2147    Narrative:      PROCEDURE: XR CHEST 1 VW     COMPARISON: 5/20/2018.     INDICATIONS: fever, congestion     FINDINGS: A single frontal (AP or PA upright portable) chest radiograph reveals no cardiac   enlargement.  There may be new atelectasis and/or infiltrate(s) in the bilateral lung bases.    Pneumonia cannot be excluded.  Atelectasis alone would be in the differential diagnosis.  Artifact   is possible.  No pneumothorax is seen.  Postoperative changes involve the cervical spine and the   left humerus.  Surgical clips are thought to be projected over the right lateral chest wall, versus   external artifact. The thoracic aorta is atherosclerotic.  Chronic calcified granulomatous disease   involves the chest.         Impression:       New atelectasis and/or infiltrate(s) is (are) suggested in the lung bases and may   represent atelectasis alone.  Pneumonia cannot be excluded.              Please note that portions of this note were completed with a voice recognition program.     LUCIA GATES JR, MD         Electronically Signed and Approved By: LUCIA GATES JR, MD on  1/04/2023 at 21:44                        CT Abdomen Pelvis Stone Protocol [[id number]] Collected: 01/04/23 1723     Updated: 01/04/23 1727    Narrative:      PROCEDURE: CT ABDOMEN PELVIS STONE PROTOCOL     COMPARISON: [facility], CT, ABDOMEN/PELVIS WITH CONTRAST, 6/07/2018, 12:58.     INDICATIONS: Flank pain, kidney stone suspected     TECHNIQUE: CT images were created without intravenous contrast.       PROTOCOL:   Standard imaging protocol performed      RADIATION:   DLP: 864.2mGy*cm    Automated exposure control was utilized to minimize radiation dose.      FINDINGS:   In the right lower lobe is a 0.4 cm nodule, new since 6/7/2018.     The liver demonstrates diffuse low density consistent with steatosis.  No focal hepatic lesion is   seen.  The spleen, pancreas and adrenal glands appear unremarkable.  There are 2 nonobstructing   right renal stones measuring 0.3 cm and 0.6 cm.  A 0.2 cm nonobstructing left renal stone is noted.    No ureteral stone is identified, ever, portions of the ureters are obscured by streak artifact   related to the patient's previous lumbar spine fusion.     Moderate atherosclerotic calcification is noted in the abdominal aorta and its major branches.  No   adenopathy or free fluid is seen in the abdomen or pelvis.  A hysterectomy has been performed.  The   adnexal regions appear unremarkable.  No acute bowel abnormality is identified.  The lack of oral   contrast limits evaluation of the bowel.     Small fat containing bilateral inguinal hernias are noted.     In the T12 vertebral body is a 2.1 cm lytic focus which is chronic, compatible with a hemangioma.    There is been previous fusion of L3 through S2.       Impression:         1. Indeterminate 0.4 cm nodule in the right lower lobe.The findings include a single, incidentally   detected, solid pulmonary nodule, measuring less than 6mm.  2017 guidelines from the Fleischner   Society for the follow-up and management  of incidentally detected indeterminate pulmonary nodules   in persons at least 35 years of age depend on nodule size (average length and width) and underlying   risk factors (including smoking and other risk factors). Please consider the following   recommendations after clinical assessment of risk factors.  For <6mm solid nodules: In low risk   patients, no follow-up required.  If suspicious morphology or upper lobe location, consider 12   month follow-up.  In high risk patients, optional CT in 12 months.    2. Diffuse hepatic steatosis  3. Previous hysterectomy  4. Bilateral nonobstructing renal stones, as above  5. No ureteral stone identified.  Portions of the ureters are obscured, as above  6. Small bilateral fat containing inguinal hernias            Walter Macias M.D.         Electronically Signed and Approved By: Walter Macias M.D. on 1/04/2023 at 17:23                            Labs Pending at Discharge:  Pending Labs     Order Current Status    Levetiracetam Level (Keppra) In process    Blood Culture - Blood, Hand, Left Preliminary result    Blood Culture - Blood, Hand, Right Preliminary result              Time spent on Discharge including face to face service: More than 30 minutes  Part of this note may be an electronic transcription/translation of spoken language to printed text using the Dragon Dictation System.     TElectronically signed by Charli Barreto MD, 01/06/23, 4:39 PM EST.

## 2023-01-07 NOTE — OUTREACH NOTE
Prep Survey    Flowsheet Row Responses   [facility] facility patient discharged from? [facility]   Is LACE score < 7 ? No   Eligibility [facility]   Date of Admission 01/04/23   Date of Discharge 01/06/23   Discharge Disposition Home or Self Care   Discharge diagnosis Recurrent lactic acidosis Intractable pain Left flank pain Bilateral nonobstructing renal stone   Does the patient have one of the following disease processes/diagnoses(primary or secondary)? Other   Does the patient have Home health ordered? No   Is there a DME ordered? No   Prep survey completed? Yes          ANAHI VILLEGAS - Registered Nurse

## 2023-01-09 NOTE — OUTREACH NOTE
Call Center TCM Note    Flowsheet Row Responses   [facility] patient discharged from? [facility]   Does the patient have one of the following disease processes/diagnoses(primary or secondary)? Other   TCM attempt successful? Yes   Call start time 1556   Call end time 1604   Discharge diagnosis Recurrent lactic acidosis Intractable pain Left flank pain Bilateral nonobstructing renal stone   Person spoke with today (if not patient) and relationship patient   Meds reviewed with patient/caregiver? Yes  [Reviewed listed med changes on AVS]   Does the patient have all medications ordered at discharge? N/A  [No new meds ordered at discharge. ]   Is the patient taking all medications as directed (includes completed medication regime)? No   What is preventing the patient from taking all medications as directed? Other  [patient reports that she is not going to change her cymbalta dose unless her specialist tells her to. Will continue twice daily. ]   Comments PCP Rafael Mi MD HOSPITAL FOLLOW UP in place for 1/11/2023  4:15 PM   Does the patient have an appointment with their PCP within 7 days of discharge? Yes   Has home health visited the patient within 72 hours of discharge? N/A   Psychosocial issues? No   Did the patient receive a copy of their discharge instructions? Yes   Nursing interventions Reviewed instructions with patient   What is the patient's perception of their health status since discharge? Same  [patient reports that she continues to have pain from kidney stones. ]   Is the patient/caregiver able to teach back signs and symptoms related to disease process for when to call PCP? Yes   Is the patient/caregiver able to teach back signs and symptoms related to disease process for when to call 911? Yes   Is the patient/caregiver able to teach back the hierarchy of who to call/visit for symptoms/problems? PCP, Specialist, Home health nurse, Urgent Care, ED, 911 Yes   If the patient is a current smoker,  are they able to teach back resources for cessation? Not a smoker   TCM call completed? Yes   Call end time 1604   Would this patient benefit from a Referral to [facility] Social Work? No   Is the patient interested in additional calls from an ambulatory ?  NOTE:  applies to high risk patients requiring additional follow-up. No          Edwige Stephen RN    1/9/2023, 16:05 EST

## 2023-01-11 NOTE — PROGRESS NOTES
Transitional Care Follow Up Visit  Subjective     Lidia Yeung is a 57 y.o. female who presents for a transitional care management visit.    Within 48 business hours after discharge our office contacted her via telephone to coordinate her care and needs.      I reviewed and discussed the details of that call along with the discharge summary, hospital problems, inpatient lab results, inpatient diagnostic studies, and consultation reports with Lidia.     Current outpatient and discharge medications have been reconciled for the patient.  Reviewed by: Rafael Mi MD      Date of TCM Phone Call 1/6/2023   [facility]   Date of Admission 1/4/2023   Date of Discharge 1/6/2023   Discharge Disposition Home or Self Care     Risk for Readmission (LACE) Score: 11 (1/6/2023  6:00 AM)      History of Present Illness  Pt doing some better.  Pt is and will be calling psychiatry to continue to follow-up with them for her depression, as they are treatingh her depression currently- pt has no SI or HI currently.     Course During Hospital Stay:  Pt admitted for lactic acidosis and left flank pain.  Pt seen by urology.  Pt needs to follow-up with urology.  Pt had abdominal CT which showed 0.4cm right lower lobe lung nodule- needs CT chest to ensure no other nodules.  Pt will see urology for kidney stones.  Pt some better.     The following portions of the patient's history were reviewed and updated as appropriate:   She  has a past medical history of Asthma, Benign essential hypertension, BRCA gene mutation positive, Chronic lower back pain, Cluster headache, Colon polyp, Constipation, Depression, Diabetes mellitus (HCC), Difficulty walking, Disc degeneration, lumbar, Elevated fasting glucose, Elevated transaminase level, Fatigue, GERD (gastroesophageal reflux disease), Hemorrhoids, Hypothyroidism, IBS (irritable bowel syndrome), Kidney stones, Memory loss, Migraine, Seizures (HCC), Stomach ulcer, and KULDIP (stress  urinary incontinence, female).  She does not have any pertinent problems on file.  She  has a past surgical history that includes Interstim Stages 1 and 2 Lead and Generator Placement; Lumbar disc surgery; Kidney stone surgery; Colonoscopy; Esophagogastroduodenoscopy; Hysterectomy; ORIF humeral shaft fracture; Mastectomy (Bilateral, 9/13/2022); and Breast reconstruction (Bilateral, 9/13/2022).  Her family history includes Arthritis in her father, maternal grandmother, and mother; Bleeding Disorder in her mother; Brain cancer in her mother; Breast cancer in her maternal aunt; Diabetes in her maternal grandmother and mother; Hypertension in her father and mother; Migraines in her father and mother; Other in her father and mother; Thyroid disease in her father and mother.  She  reports that she quit smoking about 7 years ago. Her smoking use included cigarettes. She has a 30.00 pack-year smoking history. She has never used smokeless tobacco. She reports current alcohol use. Drug use questions deferred to the physician.  Current Outpatient Medications   Medication Sig Dispense Refill   • albuterol sulfate  (90 Base) MCG/ACT inhaler Inhale 2 puffs Every 4 (Four) Hours As Needed for Wheezing. 54 g 1   • ALPRAZolam (XANAX) 2 MG tablet Take 2 mg by mouth 3 (Three) Times a Day As Needed.     • amitriptyline (ELAVIL) 25 MG tablet Take 1 tablet by mouth Every Night. 90 tablet 1   • B Complex-Folic Acid (Super B Complex Maxi) tablet Take  by mouth.     • Biotin 67377 MCG tablet Take  by mouth.     • celecoxib (CeleBREX) 200 MG capsule Take 1 capsule by mouth Daily. 90 capsule 1   • DULoxetine (CYMBALTA) 60 MG capsule Take 1 capsule by mouth Daily. 90 capsule 1   • fluticasone (FLONASE) 50 MCG/ACT nasal spray USE 2 SPRAYS IN EACH NOSTRIL EVERY DAY . APPOINTMENT IS NEEDED BEFORE MORE REFILLS (Patient taking differently: 2 sprays into the nostril(s) as directed by provider Daily.) 48 g 1   • furosemide (LASIX) 40 MG tablet  TAKE 1 TABLET EVERY DAY 90 tablet 1   • hydroCHLOROthiazide (HYDRODIURIL) 25 MG tablet TAKE 1 TABLET EVERY DAY 90 tablet 0   • ipratropium-albuterol (DUO-NEB) 0.5-2.5 mg/mL nebulizer Ipratropium-Albuterol 0.5-2.5 (3) MG/3ML Inhalation Solution; Patient Sig: Ipratropium-Albuterol 0.5-2.5 (3) MG/3ML Inhalation Solution ; 360; 0; 20-Oct-2015; Active     • lansoprazole (PREVACID) 15 MG capsule Take 1 capsule by mouth Daily. 90 capsule 1   • levETIRAcetam (KEPPRA) 750 MG tablet TAKE 1 TABLET TWICE DAILY 180 tablet 0   • levothyroxine (SYNTHROID, LEVOTHROID) 50 MCG tablet Take 1 tablet by mouth Daily. (Patient taking differently: Take 50 mcg by mouth Every Morning.) 90 tablet 1   • linaclotide (LINZESS) 290 MCG capsule capsule Take 1 capsule by mouth Every Morning Before Breakfast. 90 capsule 1   • lisinopril (PRINIVIL,ZESTRIL) 20 MG tablet Take 1 tablet by mouth Daily. 90 tablet 1   • Omega 3 1000 MG capsule Take 1,000 mg by mouth Daily.     • polyethylene glycol (MIRALAX) 17 GM/SCOOP powder Take 17 g by mouth.     • potassium chloride (K-DUR,KLOR-CON) 20 MEQ CR tablet Take 1 tablet by mouth 2 (Two) Times a Day. 180 tablet 1   • vitamin E 400 UNIT capsule Take 400 Units by mouth Daily.     • dapagliflozin Propanediol (Farxiga) 10 MG tablet Take 10 mg by mouth Daily. 90 tablet 1   • metFORMIN ER (GLUCOPHAGE-XR) 500 MG 24 hr tablet Take 1 tablet by mouth Daily With Breakfast. 90 tablet 1     No current facility-administered medications for this visit.     Current Outpatient Medications on File Prior to Visit   Medication Sig   • albuterol sulfate  (90 Base) MCG/ACT inhaler Inhale 2 puffs Every 4 (Four) Hours As Needed for Wheezing.   • ALPRAZolam (XANAX) 2 MG tablet Take 2 mg by mouth 3 (Three) Times a Day As Needed.   • amitriptyline (ELAVIL) 25 MG tablet Take 1 tablet by mouth Every Night.   • B Complex-Folic Acid (Super B Complex Maxi) tablet Take  by mouth.   • Biotin 16209 MCG tablet Take  by mouth.   •  celecoxib (CeleBREX) 200 MG capsule Take 1 capsule by mouth Daily.   • DULoxetine (CYMBALTA) 60 MG capsule Take 1 capsule by mouth Daily.   • fluticasone (FLONASE) 50 MCG/ACT nasal spray USE 2 SPRAYS IN EACH NOSTRIL EVERY DAY . APPOINTMENT IS NEEDED BEFORE MORE REFILLS (Patient taking differently: 2 sprays into the nostril(s) as directed by provider Daily.)   • furosemide (LASIX) 40 MG tablet TAKE 1 TABLET EVERY DAY   • hydroCHLOROthiazide (HYDRODIURIL) 25 MG tablet TAKE 1 TABLET EVERY DAY   • ipratropium-albuterol (DUO-NEB) 0.5-2.5 mg/mL nebulizer Ipratropium-Albuterol 0.5-2.5 (3) MG/3ML Inhalation Solution; Patient Sig: Ipratropium-Albuterol 0.5-2.5 (3) MG/3ML Inhalation Solution ; 360; 0; 20-Oct-2015; Active   • lansoprazole (PREVACID) 15 MG capsule Take 1 capsule by mouth Daily.   • levETIRAcetam (KEPPRA) 750 MG tablet TAKE 1 TABLET TWICE DAILY   • levothyroxine (SYNTHROID, LEVOTHROID) 50 MCG tablet Take 1 tablet by mouth Daily. (Patient taking differently: Take 50 mcg by mouth Every Morning.)   • linaclotide (LINZESS) 290 MCG capsule capsule Take 1 capsule by mouth Every Morning Before Breakfast.   • lisinopril (PRINIVIL,ZESTRIL) 20 MG tablet Take 1 tablet by mouth Daily.   • Omega 3 1000 MG capsule Take 1,000 mg by mouth Daily.   • polyethylene glycol (MIRALAX) 17 GM/SCOOP powder Take 17 g by mouth.   • potassium chloride (K-DUR,KLOR-CON) 20 MEQ CR tablet Take 1 tablet by mouth 2 (Two) Times a Day.   • vitamin E 400 UNIT capsule Take 400 Units by mouth Daily.     No current facility-administered medications on file prior to visit.     She is allergic to atorvastatin, atorvastatin calcium, ciprofloxacin, erythromycin, fentanyl, rosuvastatin, statins, and penicillins..    Review of Systems   Constitutional: Negative for fatigue and fever.   Eyes: Negative for visual disturbance.   Respiratory: Negative for cough, chest tightness, shortness of breath and wheezing.    Cardiovascular: Negative for chest pain,  palpitations and leg swelling.   Gastrointestinal: Negative for abdominal pain, diarrhea, nausea and vomiting.   Skin: Negative for rash.   Neurological: Negative for light-headedness and headaches.   Psychiatric/Behavioral: Positive for dysphoric mood. Negative for suicidal ideas. The patient is not nervous/anxious.        Objective   Physical Exam  Vitals reviewed.   Constitutional:       Appearance: Normal appearance. She is well-developed.   HENT:      Head: Normocephalic and atraumatic.      Right Ear: External ear normal.      Left Ear: External ear normal.      Mouth/Throat:      Pharynx: No oropharyngeal exudate.   Eyes:      Conjunctiva/sclera: Conjunctivae normal.      Pupils: Pupils are equal, round, and reactive to light.   Cardiovascular:      Rate and Rhythm: Normal rate and regular rhythm.      Pulses: Normal pulses.      Heart sounds: Normal heart sounds. No murmur heard.    No friction rub. No gallop.   Pulmonary:      Effort: Pulmonary effort is normal.      Breath sounds: Normal breath sounds. No wheezing or rhonchi.   Abdominal:      General: Abdomen is flat. Bowel sounds are normal. There is no distension.      Palpations: Abdomen is soft. There is no mass.      Tenderness: There is no abdominal tenderness. There is no right CVA tenderness, left CVA tenderness, guarding or rebound.      Hernia: No hernia is present.   Musculoskeletal:         General: Normal range of motion.   Skin:     General: Skin is warm and dry.      Capillary Refill: Capillary refill takes less than 2 seconds.   Neurological:      General: No focal deficit present.      Mental Status: She is alert and oriented to person, place, and time.      Cranial Nerves: No cranial nerve deficit.   Psychiatric:         Mood and Affect: Mood and affect normal.         Behavior: Behavior normal.         Thought Content: Thought content normal.         Judgment: Judgment normal.         Assessment & Plan   Problems Addressed this Visit         Cardiac and Vasculature    Hyperlipemia       Endocrine and Metabolic    Diabetes mellitus (HCC)    Relevant Medications    metFORMIN ER (GLUCOPHAGE-XR) 500 MG 24 hr tablet    dapagliflozin Propanediol (Farxiga) 10 MG tablet    Other Relevant Orders    Lipid Panel (Completed)    MicroAlbumin, Urine, Random - Urine, Clean Catch (Completed)    Hypothyroidism    Relevant Orders    TSH+Free T4 (Completed)       Gastrointestinal Abdominal     Bowel disease    Relevant Orders    Ambulatory Referral to Gastroenterology    Elevated LFTs    Relevant Orders    Ambulatory Referral to Gastroenterology    Irritable bowel syndrome    Relevant Orders    Ambulatory Referral to Gastroenterology    Rectal bleeding    Relevant Orders    Ambulatory Referral to Gastroenterology    Esophageal reflux    Relevant Orders    Ambulatory Referral to Gastroenterology       Genitourinary and Reproductive     Kidney stones    Relevant Orders    Ambulatory Referral to Urology   Other Visit Diagnoses     Hospital discharge follow-up    -  Primary    Lung nodule        Relevant Orders    CT Chest With Contrast    Hemorrhoids, unspecified hemorrhoid type        Relevant Orders    Ambulatory Referral to Gastroenterology      Diagnoses       Codes Comments    Hospital discharge follow-up    -  Primary ICD-10-CM: Z09  ICD-9-CM: V67.59     Lung nodule     ICD-10-CM: R91.1  ICD-9-CM: 793.11     Kidney stones     ICD-10-CM: N20.0  ICD-9-CM: 592.0     Type 2 diabetes mellitus with hyperglycemia, without long-term current use of insulin (HCC)     ICD-10-CM: E11.65  ICD-9-CM: 250.00, 790.29     Mixed hyperlipidemia     ICD-10-CM: E78.2  ICD-9-CM: 272.2     Hypothyroidism, unspecified type     ICD-10-CM: E03.9  ICD-9-CM: 244.9     Hemorrhoids, unspecified hemorrhoid type     ICD-10-CM: K64.9  ICD-9-CM: 455.6     Gastroesophageal reflux disease, unspecified whether esophagitis present     ICD-10-CM: K21.9  ICD-9-CM: 530.81     Elevated LFTs     ICD-10-CM:  R79.89  ICD-9-CM: 790.6     Bowel disease     ICD-10-CM: K63.9  ICD-9-CM: 569.9     Irritable bowel syndrome, unspecified type     ICD-10-CM: K58.9  ICD-9-CM: 564.1     Rectal bleeding     ICD-10-CM: K62.5  ICD-9-CM: 569.3

## 2023-01-11 NOTE — PROGRESS NOTES
Venipuncture Blood Specimen Collection  Venipuncture performed in left arm by Tammy Gomez with good hemostasis. Patient tolerated the procedure well without complications.   01/11/23   Tammy Gomez

## 2023-01-16 NOTE — PROGRESS NOTES
"Follow-up (4 month follow up of Bilateral breast reconstruction with placement of implants and mesh, use of spy on 09/13/22. /)        History of Present Illness  Lidia Yeung is a 57 y.o. female who presents to [facility] as a post operative follow up for Bilateral breast reconstruction with placement of implants and mesh, use of spy on 09/13/22.     Here for 4 month post op, patient noted yesterday that her right breast is swollen and bruised.  Unaware of any injury.  Recently treated in hospital for kidney stones. Does not recall injury to right breast. Does have scattered bruising over arms from IV sticks.       Subjective      Atorvastatin, Atorvastatin calcium, Ciprofloxacin, Erythromycin, Fentanyl, Rosuvastatin, Statins, and Penicillins  Allergies Reconciled.    Review of Systems   All review of system has been reviewed and it  is negative except the ones note above.     Objective     /84 (BP Location: Left arm, Patient Position: Sitting)   Pulse 106   Temp 98 °F (36.7 °C) (Temporal)   Ht 157.5 cm (62.01\")   Wt 93 kg (205 lb)   SpO2 94%   BMI 37.49 kg/m²     Body mass index is 37.49 kg/m².    Physical Exam   Cardiovascular: Normal rate.     Pulmonary/Chest  Effort normal.      Breast:   Left reconstructed breast healed with no complications, right breast with 16 cm x 8 cm  area of ecchymosis inferiorly, no tenderness to palpation    Upper extremities  Scattered yellow eccymosis  Result Review :       Assessment and Plan      Diagnoses and all orders for this visit:    1. Ecchymosis (Primary)        Additional Order(s):       Plan:     Pt was recently hospitalized and received Toradol and Lovenox. We did therapeutic US today on breast to break down hematoma. Instructed patient to discuss with her PCP that she is bruising easily for further blood work. US of breast in office and implant was intact. RTC one week. Avoid Vit E and any NSAIDS. "   Patient was given instructions and counseling regarding her condition. Please see specific information pulled into the AVS if appropriate.     Ana Cristina Shah, HODAN  01/16/2023

## 2023-01-17 NOTE — OUTREACH NOTE
Medical Week 2 Survey    Flowsheet Row Responses   [facility] patient discharged from? [facility]   Does the patient have one of the following disease processes/diagnoses(primary or secondary)? Other   Week 2 attempt successful? Yes   Call start time 1310   Discharge diagnosis Recurrent lactic acidosis Intractable pain Left flank pain Bilateral nonobstructing renal stone   Call end time 1318   Meds reviewed with patient/caregiver? Yes   Is the patient having any side effects they believe may be caused by any medication additions or changes? No   Does the patient have all medications ordered at discharge? Yes   Is the patient taking all medications as directed (includes completed medication regime)? Yes   Medication comments Pt reports she discussed the medications that were discontinued at discharge with her PCP who has now resumed   Does the patient have a primary care provider?  Yes   Does the patient have an appointment with their PCP within 7 days of discharge? Yes   Has the patient kept scheduled appointments due by today? Yes   Has home health visited the patient within 72 hours of discharge? N/A   Psychosocial issues? No   Did the patient receive a copy of their discharge instructions? Yes   Nursing interventions Reviewed instructions with patient   What is the patient's perception of their health status since discharge? Improving   Is the patient/caregiver able to teach back signs and symptoms related to disease process for when to call PCP? Yes   Is the patient/caregiver able to teach back signs and symptoms related to disease process for when to call 911? Yes   Week 2 Call Completed? Yes   Wrap up additional comments Pt reports she is still dealing with some pain issues,MD has prescribed medications for relief and she has been resting.  She has 3 CTs scheduled for tomorrow 1/18/23. Aware to monitor for increase in s/s and notify MD, seek care prn.          SEDA CASTELAN - Registered Nurse

## 2023-01-18 NOTE — PROGRESS NOTES
"Follow-up (Ecchymosis and therapeutic ultrasound of right breast)        History of Present Illness  Lidia Yeung is a 57 y.o. female who presents to [facility] as a post operative follow up for Bilateral breast reconstruction with placement of implants and mesh, use of spy on 09/13/22.     Right breast swelling and bruising.  Will do a therapeutic ultrasound in office today.      Subjective      Atorvastatin, Atorvastatin calcium, Ciprofloxacin, Erythromycin, Fentanyl, Rosuvastatin, Statins, and Penicillins  Allergies Reconciled.    Review of Systems   All review of system has been reviewed and it  is negative except the ones note above.     Objective     BP (!) 199/94 (BP Location: Left arm, Patient Position: Sitting)   Pulse (!) 121   Temp 98 °F (36.7 °C) (Temporal)   Ht 157.5 cm (62.01\")   Wt 94.3 kg (208 lb)   SpO2 97%   BMI 38.03 kg/m²     Body mass index is 38.03 kg/m².    Physical Exam   Cardiovascular: Normal rate.     Pulmonary/Chest  Effort normal.      Breast:   Right breast with swelling and bruising.   Upper extremities  Scattered yellow eccymosis  Result Review :       Assessment and Plan      Diagnoses and all orders for this visit:    1. Breast hematoma (Primary)        Additional Order(s):   250 cc of hematoma aspirate. Follow up next week.     Plan:       Ashely Rodriguez MD  01/18/2023      "

## 2023-01-19 NOTE — PROGRESS NOTES
Call pt:  CT chest shows stable 4mm right sided lung nodule that has been stable since 2018- radiology says that this nodule is considered benign now and nothing further needs to be done regarding it.

## 2023-01-21 PROBLEM — N64.89 BREAST HEMATOMA: Status: ACTIVE | Noted: 2023-01-01

## 2023-01-24 NOTE — OUTREACH NOTE
Medical Week 3 Survey    Flowsheet Row Responses   [facility] patient discharged from? [facility]   Does the patient have one of the following disease processes/diagnoses(primary or secondary)? Other   Week 3 attempt successful? Yes   Call start time 1700   Call end time 1707   Person spoke with today (if not patient) and relationship devin Thao reviewed with patient/caregiver? Yes   Is the patient having any side effects they believe may be caused by any medication additions or changes? No   Does the patient have all medications ordered at discharge? Yes   Is the patient taking all medications as directed (includes completed medication regime)? Yes   Comments regarding appointments Provided information regarding Urology appt on 2/28/23   Does the patient have a primary care provider?  Yes   Does the patient have an appointment with their PCP within 7 days of discharge? Yes   Has the patient kept scheduled appointments due by today? Yes   Comments PCP Rafael Mi MD HOSPITAL FOLLOW UP in place for 1/11/2023  4:15 PM   Has home health visited the patient within 72 hours of discharge? N/A   Psychosocial issues? No   Did the patient receive a copy of their discharge instructions? Yes   Nursing interventions Reviewed instructions with patient  [with  ]   What is the patient's perception of their health status since discharge? Same  [ reports still some pain issues and feels as if she did when she had prior kidney stone--denies fever/chills and reviewed other s/s.  Reports there was some confusion with some of her post discharge testing and not all CTs were completed]   Is the patient/caregiver able to teach back signs and symptoms related to disease process for when to call PCP? Yes   Is the patient/caregiver able to teach back signs and symptoms related to disease process for when to call 911? Yes   Additional teach back comments  does understand to call MD or seek care for  uncontrolled s/s   Week 3 Call Completed? Yes          SEDA CASTELAN - Registered Nurse

## 2023-02-02 NOTE — OUTREACH NOTE
Medical Week 4 Survey    Flowsheet Row Responses   [facility] patient discharged from? [facility]   Does the patient have one of the following disease processes/diagnoses(primary or secondary)? Other   Week 4 attempt successful? Yes   Call start time 1640   Call end time 1640   Discharge diagnosis Recurrent lactic acidosis Intractable pain Left flank pain Bilateral nonobstructing renal stone   Meds reviewed with patient/caregiver? Yes   Is the patient taking all medications as directed (includes completed medication regime)? Yes   Has the patient kept scheduled appointments due by today? Yes   Is the patient still receiving Home Health Services? N/A   What is the patient's perception of their health status since discharge? Improving   Week 4 Call Completed? Yes   Would the patient like one additional call? No   Graduated Yes   Is the patient interested in additional calls from an ambulatory ?  NOTE:  applies to high risk patients requiring additional follow-up. No   Did the patient feel the follow up calls were helpful during their recovery period? Yes   Was the number of calls appropriate? Yes          NATE VILLEGAS - Registered Nurse

## 2023-02-07 NOTE — PROGRESS NOTES
"Chief Complaint  Abdominal Pain (Abdominal pain )    Subjective          Lidia Yeung presents to [facility]  History of Present Illness  Pt has had 3 weeks bilateral inguinal pain- R>L- has bilateral inguinal hernias  Lower abdominal pain    Chest Pain   This is a new problem. The current episode started more than 1 month ago. The onset quality is sudden. The problem occurs intermittently. The problem has been gradually worsening. The pain is present in the substernal region. The pain is moderate. The quality of the pain is described as burning. The pain does not radiate. Associated symptoms include abdominal pain. Pertinent negatives include no back pain, claudication, cough, diaphoresis, dizziness, exertional chest pressure, fever, headaches, hemoptysis, irregular heartbeat, leg pain, lower extremity edema, malaise/fatigue, nausea, near-syncope, numbness, orthopnea, palpitations, PND, shortness of breath, sputum production, syncope, vomiting or weakness. Treatments tried: ppi. The treatment provided significant relief.       Objective   Allergies   Allergen Reactions   • Atorvastatin Hives   • Atorvastatin Calcium Swelling   • Ciprofloxacin Itching     FEVER,CHILLS   • Erythromycin Hives and Nausea And Vomiting   • Fentanyl Itching     FROM DURAGESIC PATCH   • Rosuvastatin Hives   • Statins Hives   • Penicillins Itching     YEAST INFECTION/\"THEY DONT SEEM TO WORK\"     Immunization History   Administered Date(s) Administered   • COVID-19 (MODERNA) 1st, 2nd, 3rd Dose Only 05/15/2021, 06/23/2021   • FluMist 2-49yrs 10/03/2014     Past Medical History:   Diagnosis Date   • Asthma    • Benign essential hypertension    • BRCA gene mutation positive     TO HAVE ELECTIVE BILATERAL MASTECTOMY   • Chronic lower back pain    • Cluster headache    • Colon polyp     REMOVED   • Constipation    • Depression    • Diabetes mellitus (HCC)    • Difficulty walking     USES CANE SINCE BACK SURGERY " 3/14/2022   • Disc degeneration, lumbar    • Elevated fasting glucose    • Elevated transaminase level     FATTY LIVER   • Fatigue    • GERD (gastroesophageal reflux disease)    • Hemorrhoids    • Hypothyroidism    • IBS (irritable bowel syndrome)    • Kidney stones    • Memory loss    • Migraine    • Seizures (HCC)     LAST , 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   • Stomach ulcer    • KULDIP (stress urinary incontinence, female)       Past Surgical History:   Procedure Laterality Date   • BREAST RECONSTRUCTION Bilateral 2022    Procedure: Bilateral breast reconstruction with placement of implants and mesh, use of spy;  Surgeon: Ashely Rodriguez MD;  [place] [facility] [place] [facility]  Service: Plastics;  Laterality: Bilateral;   • COLONOSCOPY     • ENDOSCOPY     • HYSTERECTOMY     • INTERSTIM PLACEMENT     • KIDNEY STONE SURGERY      MULTIPLE   • LUMBAR DISC SURGERY      MULTIPLE, FUSION LUMBAR SPINE 3/14/22   • MASTECTOMY Bilateral 2022    Procedure: BREAST MASTECTOMY BILATERAL;  Surgeon: Kaya Mills MD;  [place] [facility] [place] [facility];  Service: General;  Laterality: Bilateral;   • ORIF HUMERAL SHAFT FRACTURE      W/IMPLANTS      Social History     Socioeconomic History   • Marital status:    Tobacco Use   • Smoking status: Former     Packs/day: 1.00     Years: 30.00     Pack years: 30.00     Types: Cigarettes     Quit date: 2016     Years since quittin.1   • Smokeless tobacco: Never   Vaping Use   • Vaping Use: Some days   • Substances: Flavoring   • Devices: Disposable, Pre-filled or refillable cartridge, Refillable tank, Pre-filled pod   • Passive vaping exposure: Yes   Substance and Sexual Activity   • Alcohol use: Yes     Comment: occasionally   • Drug use: Defer   • Sexual activity: Defer        Current Outpatient Medications:   •  albuterol sulfate  (90 Base) MCG/ACT inhaler, Inhale 2 puffs Every 4 (Four) Hours As Needed for Wheezing., Disp: 54 g, Rfl: 1  •  ALPRAZolam (XANAX) 2 MG tablet, Take 2 mg by  mouth 3 (Three) Times a Day As Needed., Disp: , Rfl:   •  amitriptyline (ELAVIL) 25 MG tablet, Take 1 tablet by mouth Every Night., Disp: 90 tablet, Rfl: 1  •  B Complex-Folic Acid (Super B Complex Maxi) tablet, Take  by mouth., Disp: , Rfl:   •  Biotin 33276 MCG tablet, Take  by mouth., Disp: , Rfl:   •  dapagliflozin Propanediol (Farxiga) 10 MG tablet, Take 10 mg by mouth Daily., Disp: 90 tablet, Rfl: 1  •  DULoxetine (CYMBALTA) 60 MG capsule, Take 1 capsule by mouth Daily., Disp: 90 capsule, Rfl: 1  •  fluticasone (FLONASE) 50 MCG/ACT nasal spray, USE 2 SPRAYS IN EACH NOSTRIL EVERY DAY . APPOINTMENT IS NEEDED BEFORE MORE REFILLS (Patient taking differently: 2 sprays into the nostril(s) as directed by provider Daily.), Disp: 48 g, Rfl: 1  •  furosemide (LASIX) 40 MG tablet, TAKE 1 TABLET EVERY DAY, Disp: 90 tablet, Rfl: 1  •  hydroCHLOROthiazide (HYDRODIURIL) 25 MG tablet, TAKE 1 TABLET EVERY DAY, Disp: 90 tablet, Rfl: 0  •  ipratropium-albuterol (DUO-NEB) 0.5-2.5 mg/mL nebulizer, Ipratropium-Albuterol 0.5-2.5 (3) MG/3ML Inhalation Solution; Patient Sig: Ipratropium-Albuterol 0.5-2.5 (3) MG/3ML Inhalation Solution ; 360; 0; 20-Oct-2015; Active, Disp: , Rfl:   •  levETIRAcetam (KEPPRA) 750 MG tablet, TAKE 1 TABLET TWICE DAILY, Disp: 180 tablet, Rfl: 0  •  levothyroxine (SYNTHROID, LEVOTHROID) 50 MCG tablet, TAKE 1 TABLET EVERY DAY, Disp: 90 tablet, Rfl: 1  •  linaclotide (LINZESS) 290 MCG capsule capsule, Take 1 capsule by mouth Every Morning Before Breakfast., Disp: 90 capsule, Rfl: 1  •  lisinopril (PRINIVIL,ZESTRIL) 20 MG tablet, Take 1 tablet by mouth Daily., Disp: 90 tablet, Rfl: 1  •  metFORMIN ER (GLUCOPHAGE-XR) 500 MG 24 hr tablet, Take 1 tablet by mouth Daily With Breakfast., Disp: 90 tablet, Rfl: 1  •  Omega 3 1000 MG capsule, Take 1,000 mg by mouth Daily., Disp: , Rfl:   •  polyethylene glycol (MIRALAX) 17 GM/SCOOP powder, Take 17 g by mouth., Disp: , Rfl:   •  potassium chloride (K-DUR,KLOR-CON) 20 MEQ  CR tablet, Take 1 tablet by mouth 2 (Two) Times a Day., Disp: 180 tablet, Rfl: 1  •  vitamin E 400 UNIT capsule, Take 400 Units by mouth Daily., Disp: , Rfl:   •  celecoxib (CeleBREX) 200 MG capsule, Take 1 capsule by mouth Daily., Disp: 90 capsule, Rfl: 1  •  HYDROcodone-acetaminophen (Norco) 5-325 MG per tablet, Take 1 tablet by mouth Every 8 (Eight) Hours As Needed for Moderate Pain., Disp: 21 tablet, Rfl: 0  •  pantoprazole (Protonix) 40 MG EC tablet, Take 1 tablet by mouth Daily., Disp: 30 tablet, Rfl: 1   Family History   Problem Relation Age of Onset   • Brain cancer Mother    • Arthritis Mother    • Bleeding Disorder Mother    • Diabetes Mother    • Hypertension Mother    • Other Mother         malignant neoplasm   • Migraines Mother    • Thyroid disease Mother    • Arthritis Father    • Hypertension Father    • Other Father         malignant neoplasm   • Migraines Father    • Thyroid disease Father    • Breast cancer Maternal Aunt    • Arthritis Maternal Grandmother    • Diabetes Maternal Grandmother    • Malig Hyperthermia Neg Hx           Vital Signs:   Vitals:    02/07/23 1124   BP: 158/82   Pulse: 100   Temp: 96.9 °F (36.1 °C)   SpO2: 97%       Review of Systems   Constitutional: Negative for diaphoresis, fever and malaise/fatigue.   Respiratory: Negative for cough, hemoptysis, sputum production and shortness of breath.    Cardiovascular: Positive for chest pain. Negative for palpitations, orthopnea, claudication, syncope, PND and near-syncope.   Gastrointestinal: Positive for abdominal pain. Negative for nausea and vomiting.   Musculoskeletal: Negative for back pain.   Neurological: Negative for dizziness, weakness, numbness and headaches.      Physical Exam  Vitals reviewed. Exam conducted with a chaperone present.   Constitutional:       Appearance: Normal appearance. She is well-developed.   HENT:      Head: Normocephalic and atraumatic.      Right Ear: External ear normal.      Left Ear: External  ear normal.      Mouth/Throat:      Mouth: Mucous membranes are moist.      Pharynx: Oropharynx is clear. No oropharyngeal exudate or posterior oropharyngeal erythema.   Eyes:      Conjunctiva/sclera: Conjunctivae normal.      Pupils: Pupils are equal, round, and reactive to light.   Cardiovascular:      Rate and Rhythm: Normal rate and regular rhythm.      Pulses: Normal pulses.      Heart sounds: Normal heart sounds. No murmur heard.    No friction rub. No gallop.   Pulmonary:      Effort: Pulmonary effort is normal.      Breath sounds: Normal breath sounds. No wheezing or rhonchi.   Abdominal:      General: Abdomen is flat. Bowel sounds are normal. There is no distension.      Palpations: Abdomen is soft. There is no mass.      Tenderness: There is abdominal tenderness. There is no guarding or rebound.      Hernia: No hernia is present.      Comments: RLQ tenderness in area of inguinal canal.   Musculoskeletal:         General: Normal range of motion.   Skin:     General: Skin is warm and dry.      Capillary Refill: Capillary refill takes less than 2 seconds.   Neurological:      General: No focal deficit present.      Mental Status: She is alert and oriented to person, place, and time.      Cranial Nerves: No cranial nerve deficit.   Psychiatric:         Mood and Affect: Mood and affect normal.         Behavior: Behavior normal.         Thought Content: Thought content normal.         Judgment: Judgment normal.        Result Review :            ECG 12 Lead    Date/Time: 2/7/2023 12:34 PM  Performed by: Rafael Mi MD  Authorized by: Rafael Mi MD   Comparison: not compared with previous ECG   Previous ECG: no previous ECG available  Rhythm: sinus rhythm  Rate: normal  Conduction: right bundle branch block  ST Segments: ST segments normal  T Waves: T waves normal  QRS axis: normal  Other: no other findings  Lead: right-sided leads used    Clinical impression: abnormal EKG               Assessment and Plan    Diagnoses and all orders for this visit:    1. Lower abdominal pain (Primary)  -     CT Abdomen Pelvis With Contrast; Future  -     Ambulatory Referral to General Surgery  -     CBC Auto Differential  -     Comprehensive Metabolic Panel  -     HYDROcodone-acetaminophen (Norco) 5-325 MG per tablet; Take 1 tablet by mouth Every 8 (Eight) Hours As Needed for Moderate Pain.  Dispense: 21 tablet; Refill: 0    2. Bilateral inguinal hernia without obstruction or gangrene, recurrence not specified  -     Ambulatory Referral to General Surgery    3. Chest pain, unspecified type  -     ECG 12 Lead  -     Ambulatory Referral to Cardiology  -     Troponin  -     pantoprazole (Protonix) 40 MG EC tablet; Take 1 tablet by mouth Daily.  Dispense: 30 tablet; Refill: 1            Follow Up   Return in about 1 week (around 2/14/2023) for Recheck.  Patient was given instructions and counseling regarding her condition or for health maintenance advice. Please see specific information pulled into the AVS if appropriate.     Discussed symptoms of incarcerated hernia and need to immediately go to ER if she has them, also discussed pt going to ER if chest pain returns and lasts longer than 5-10 minutes.

## 2023-02-07 NOTE — PROGRESS NOTES
Venipuncture Blood Specimen Collection  Venipuncture performed in left arm by aMhnaz Reyes with good hemostasis. Patient tolerated the procedure well without complications.   02/07/23   Mahnaz Reyes

## 2023-02-08 NOTE — ED PROVIDER NOTES
Time: 10:46 PM EST  Date of encounter:  2/7/2023  Independent Historian/Clinical History and Information was obtained by:   Patient and Family  Chief Complaint: CP    History is limited by: N/A    History of Present Illness:  Patient is a 57 y.o. year old female who presents to the emergency department for evaluation of intermittent chest pain for the past several weeks.  Has a has history of acid reflux.  Had a Trop done at her PCP office today which was elevated at 10.  Currently not having chest pain in triage (Meg Carpio PA-C Provider in Triage).     The patient reports that she has been having intermittent retrosternal chest burning that feels similar to prior acid reflux described as worsening.  Her insurance took her off of her acid reflux medication 1 year ago concerned that they could cause long-term issues (she believes it was pepcid, but unsure).  She states that she has a history of acid reflux as well as ulcers.  She has intermittent rectal bleeding because of her ulcers.  She denied talking with her insurance and has not gotten this resolved.  She does state that normally she gets the pains after eating tomato sauces or other acidic foods but she has gotten the same burning discomfort randomly and not associated with eating.  Denies any shortness of breath, cough, leg swelling. Denies abdominal pain currently but does have history of hernias that she was seen by PCP for. She does report that 3 months ago she had a preventative mastectomy for testing positive for BRCA-1 gene. Denies pleuritic pain, calf claudication.     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       HPI    Patient Care Team  Primary Care Provider: Rafael Mi MD    Past Medical History:     Allergies   Allergen Reactions   • Atorvastatin Hives   • Atorvastatin Calcium Swelling   • Ciprofloxacin Itching     FEVER,CHILLS   • Erythromycin Hives and Nausea And Vomiting  "  • Fentanyl Itching     FROM DURAGESIC PATCH   • Rosuvastatin Hives   • Statins Hives   • Penicillins Itching     YEAST INFECTION/\"THEY DONT SEEM TO WORK\"     Past Medical History:   Diagnosis Date   • Asthma    • Benign essential hypertension    • BRCA gene mutation positive     TO HAVE ELECTIVE BILATERAL MASTECTOMY   • Chronic lower back pain    • Cluster headache    • Colon polyp     REMOVED   • Constipation    • Depression    • Diabetes mellitus (HCC)    • Difficulty walking     USES CANE SINCE BACK SURGERY 3/14/2022   • Disc degeneration, lumbar    • Elevated fasting glucose    • Elevated transaminase level     FATTY LIVER   • Fatigue    • GERD (gastroesophageal reflux disease)    • Hemorrhoids    • Hypothyroidism    • IBS (irritable bowel syndrome)    • Kidney stones    • Memory loss    • Migraine    • Seizures (HCC)     LAST 2020, 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   • Stomach ulcer    • KULDIP (stress urinary incontinence, female)      Past Surgical History:   Procedure Laterality Date   • BREAST RECONSTRUCTION Bilateral 9/13/2022    Procedure: Bilateral breast reconstruction with placement of implants and mesh, use of spy;  Surgeon: Ashely Rodriguez MD;  [place] [facility] [place] [facility] [place]: [place];  Laterality: Bilateral;   • COLONOSCOPY     • ENDOSCOPY     • HYSTERECTOMY     • INTERSTIM PLACEMENT     • KIDNEY STONE SURGERY      MULTIPLE   • LUMBAR DISC SURGERY      MULTIPLE, FUSION LUMBAR SPINE 3/14/22   • MASTECTOMY Bilateral 9/13/2022    Procedure: BREAST MASTECTOMY BILATERAL;  Surgeon: Kaya Mills MD;  [place] [facility] [place] [facility] [place]  Service: General;  Laterality: Bilateral;   • ORIF HUMERAL SHAFT FRACTURE      W/IMPLANTS     Family History   Problem Relation Age of Onset   • Brain cancer Mother    • Arthritis Mother    • Bleeding Disorder Mother    • Diabetes Mother    • Hypertension Mother    • Other Mother         malignant neoplasm   • Migraines Mother    • Thyroid disease Mother    • Arthritis Father    • " Hypertension Father    • Other Father         malignant neoplasm   • Migraines Father    • Thyroid disease Father    • Breast cancer Maternal Aunt    • Arthritis Maternal Grandmother    • Diabetes Maternal Grandmother    • Malig Hyperthermia Neg Hx        Home Medications:  Prior to Admission medications    Medication Sig Start Date End Date Taking? Authorizing Provider   albuterol sulfate  (90 Base) MCG/ACT inhaler Inhale 2 puffs Every 4 (Four) Hours As Needed for Wheezing. 7/15/22   Rafael Mi MD   ALPRAZolam (XANAX) 2 MG tablet Take 2 mg by mouth 3 (Three) Times a Day As Needed. 7/27/22   Tana Avitia MD   amitriptyline (ELAVIL) 25 MG tablet Take 1 tablet by mouth Every Night. 7/15/22   Rafael Mi MD   B Complex-Folic Acid (Super B Complex Maxi) tablet Take  by mouth.    Tana Avitia MD   Biotin 28810 MCG tablet Take  by mouth.    Tana Avitia MD   celecoxib (CeleBREX) 200 MG capsule Take 1 capsule by mouth Daily. 7/15/22   Rafael Mi MD   dapagliflozin Propanediol (Farxiga) 10 MG tablet Take 10 mg by mouth Daily. 1/11/23   Rafael Mi MD   DULoxetine (CYMBALTA) 60 MG capsule Take 1 capsule by mouth Daily. 7/15/22   Rafael Mi MD   fluticasone (FLONASE) 50 MCG/ACT nasal spray USE 2 SPRAYS IN EACH NOSTRIL EVERY DAY . APPOINTMENT IS NEEDED BEFORE MORE REFILLS  Patient taking differently: 2 sprays into the nostril(s) as directed by provider Daily. 12/8/22   Rafael Mi MD   furosemide (LASIX) 40 MG tablet TAKE 1 TABLET EVERY DAY 12/8/22   Rafael Mi MD   hydroCHLOROthiazide (HYDRODIURIL) 25 MG tablet TAKE 1 TABLET EVERY DAY 1/4/23   Mickie Samson DO   HYDROcodone-acetaminophen (Norco) 5-325 MG per tablet Take 1 tablet by mouth Every 8 (Eight) Hours As Needed for Moderate Pain. 2/7/23   Rafael Mi MD   ipratropium-albuterol (DUO-NEB) 0.5-2.5 mg/mL nebulizer Ipratropium-Albuterol 0.5-2.5 (3) MG/3ML  Inhalation Solution; Patient Sig: Ipratropium-Albuterol 0.5-2.5 (3) MG/3ML Inhalation Solution ; 360; 0; -Oct-2015; Active 10/20/15   Tana Avitia MD   levETIRAcetam (KEPPRA) 750 MG tablet TAKE 1 TABLET TWICE DAILY 22   Rafael Mi MD   levothyroxine (SYNTHROID, LEVOTHROID) 50 MCG tablet TAKE 1 TABLET EVERY DAY 23   Rafael Mi MD   linaclotide (LINZESS) 290 MCG capsule capsule Take 1 capsule by mouth Every Morning Before Breakfast. 7/15/22   Rafael Mi MD   lisinopril (PRINIVIL,ZESTRIL) 20 MG tablet Take 1 tablet by mouth Daily. 7/15/22   Rafael Mi MD   metFORMIN ER (GLUCOPHAGE-XR) 500 MG 24 hr tablet Take 1 tablet by mouth Daily With Breakfast. 23   Rafael Mi MD   Omega 3 1000 MG capsule Take 1,000 mg by mouth Daily.    Tana Avitia MD   pantoprazole (Protonix) 40 MG EC tablet Take 1 tablet by mouth Daily. 23   Rafael Mi MD   polyethylene glycol (MIRALAX) 17 GM/SCOOP powder Take 17 g by mouth.    Tana Avitia MD   potassium chloride (K-DUR,KLOR-CON) 20 MEQ CR tablet Take 1 tablet by mouth 2 (Two) Times a Day. 7/15/22   Rafael Mi MD   vitamin E 400 UNIT capsule Take 400 Units by mouth Daily.    Tana Avitia MD   lansoprazole (PREVACID) 15 MG capsule Take 1 capsule by mouth Daily. 7/15/22 2/7/23  Rafael Mi MD        Social History:   Social History     Tobacco Use   • Smoking status: Former     Packs/day: 1.00     Years: 30.00     Pack years: 30.00     Types: Cigarettes     Quit date: 2016     Years since quittin.1   • Smokeless tobacco: Never   Vaping Use   • Vaping Use: Some days   • Substances: Flavoring   • Devices: Disposable, Pre-filled or refillable cartridge, Refillable tank, Pre-filled pod   • Passive vaping exposure: Yes   Substance Use Topics   • Alcohol use: Yes     Comment: occasionally   • Drug use: Defer         Review of Systems:  Review of  "Systems   Constitutional: Negative for chills and fever.   HENT: Negative for congestion and sore throat.    Respiratory: Negative for cough and shortness of breath.    Cardiovascular: Positive for chest pain. Negative for palpitations.   Gastrointestinal: Positive for abdominal pain, blood in stool (chronic) and nausea. Negative for diarrhea and vomiting.   Genitourinary: Negative for dysuria.   Neurological: Negative for headaches.   All other systems reviewed and are negative.       Physical Exam:  /85 (BP Location: Left arm, Patient Position: Lying)   Pulse 80   Temp 98 °F (36.7 °C) (Oral)   Resp 16   Ht 167.6 cm (66\")   Wt 93.2 kg (205 lb 7.5 oz)   SpO2 94%   BMI 33.16 kg/m²     Physical Exam  Vitals and nursing note reviewed.   Constitutional:       Appearance: Normal appearance. She is not ill-appearing or toxic-appearing.   HENT:      Head: Normocephalic.      Nose: Nose normal.      Mouth/Throat:      Mouth: Mucous membranes are moist.   Eyes:      Conjunctiva/sclera: Conjunctivae normal.      Pupils: Pupils are equal, round, and reactive to light.   Cardiovascular:      Rate and Rhythm: Normal rate and regular rhythm.      Heart sounds: Normal heart sounds.   Pulmonary:      Effort: Pulmonary effort is normal.      Breath sounds: Normal breath sounds.   Abdominal:      General: There is no distension.      Palpations: Abdomen is soft.      Tenderness: There is no abdominal tenderness. There is no guarding or rebound.   Musculoskeletal:         General: Normal range of motion.      Cervical back: Normal range of motion and neck supple.   Skin:     General: Skin is warm and dry.      Capillary Refill: Capillary refill takes less than 2 seconds.   Neurological:      General: No focal deficit present.      Mental Status: She is alert and oriented to person, place, and time.   Psychiatric:         Mood and Affect: Mood normal.         Behavior: Behavior normal.            "       Procedures:  Procedures      Medical Decision Making:      Comorbidities that affect care:    IBS, stomach ulcers, Diabetes, Hypertension, Thyroid Disease    External Notes reviewed:    Previous Clinic Note, Previous EKG and Previous Labs      The following orders were placed and all results were independently analyzed by me:  Orders Placed This Encounter   Procedures   • XR Chest 1 View   • [place] Draw   • Troponin   • Comprehensive Metabolic Panel   • Lipase   • BNP   • Magnesium   • CBC Auto Differential   • High Sensitivity Troponin T 2Hr   • NPO Diet NPO Type: Strict NPO   • Undress & Gown   • Cardiac Monitoring   • Continuous Pulse Oximetry   • Oxygen Therapy- Nasal Cannula; 2 LPM; Titrate for SPO2: equal to or greater than, 92%   • ECG 12 Lead ED Triage Standing Order; Chest Pain   • ECG 12 Lead ED Triage Standing Order; Chest Pain   • Insert Peripheral IV   • CBC & Differential   • Green Top (Gel)   • Lavender Top   • Gold Top - SST   • Light Blue Top       Medications Given in the Emergency Department:  Medications   sodium chloride 0.9 % flush 10 mL (10 mL Intravenous Given 2/8/23 0032)   aspirin chewable tablet 324 mg (324 mg Oral Not Given 2/8/23 0031)        ED Course:    ED Course as of 02/08/23 0206 Tue Feb 07, 2023 2247 --- PROVIDER IN TRIAGE NOTE ---    The patient was evaluated my meMeg in triage. Orders were placed and the patient is currently awaiting disposition.    [AJ]      ED Course User Index  [AJ] Meg Carpio PA-C       Labs:    Lab Results (last 24 hours)     Procedure Component Value Units Date/Time    CBC Auto Differential [[id number]]  (Abnormal) Collected: 02/07/23 1237    Specimen: Blood from Arm, Left Updated: 02/07/23 1808     WBC 7.73 10*3/mm3      RBC 4.78 10*6/mm3      Hemoglobin 14.8 g/dL      Hematocrit 44.6 %      MCV 93.3 fL      MCH 31.0 pg      MCHC 33.2 g/dL      RDW 13.9 %      RDW-SD 47.0 fl      MPV 10.2 fL      Platelets 245 10*3/mm3       Neutrophil % 69.8 %      Lymphocyte % 18.4 %      Monocyte % 7.5 %      Eosinophil % 3.4 %      Basophil % 0.6 %      Immature Grans % 0.3 %      Neutrophils, Absolute 5.40 10*3/mm3      Lymphocytes, Absolute 1.42 10*3/mm3      Monocytes, Absolute 0.58 10*3/mm3      Eosinophils, Absolute 0.26 10*3/mm3      Basophils, Absolute 0.05 10*3/mm3      Immature Grans, Absolute 0.02 10*3/mm3      nRBC 0.0 /100 WBC     Comprehensive Metabolic Panel [[id number]]  (Abnormal) Collected: 02/07/23 1237    Specimen: Blood from Arm, Left Updated: 02/07/23 2023     Glucose 112 mg/dL      BUN 15 mg/dL      Creatinine 0.85 mg/dL      Sodium 141 mmol/L      Potassium 3.8 mmol/L      Chloride 99 mmol/L      CO2 32.0 mmol/L      Calcium 10.3 mg/dL      Total Protein 7.2 g/dL      Albumin 4.9 g/dL      ALT (SGPT) 41 U/L      AST (SGOT) 39 U/L      Alkaline Phosphatase 97 U/L      Total Bilirubin 0.5 mg/dL      Globulin 2.3 gm/dL      A/G Ratio 2.1 g/dL      BUN/Creatinine Ratio 17.6     Anion Gap 10.0 mmol/L      eGFR 79.5 mL/min/1.73     Narrative:      GFR Normal >60  Chronic Kidney Disease <60  Kidney Failure <15      Troponin [[id number]]  (Abnormal) Collected: 02/07/23 1241    Specimen: Blood from Arm, Left Updated: 02/07/23 2045     HS Troponin T 10 ng/L     Narrative:      High Sensitive Troponin T Reference Range:  <10.0 ng/L- Negative Female for AMI  <15.0 ng/L- Negative Male for AMI  >=10 - Abnormal Female indicating possible myocardial injury.  >=15 - Abnormal Male indicating possible myocardial injury.   Clinicians would have to utilize clinical acumen, EKG, Troponin, and serial changes to determine if it is an Acute Myocaridal Infarction or myocardial injury due to an underlying chronic condition.         Troponin [[id number]]  (Abnormal) Collected: 02/07/23 2252    Specimen: Blood Updated: 02/08/23 0007     HS Troponin T 12 ng/L     Narrative:      High Sensitive Troponin T Reference Range:  <10.0 ng/L- Negative Female for  AMI  <15.0 ng/L- Negative Male for AMI  >=10 - Abnormal Female indicating possible myocardial injury.  >=15 - Abnormal Male indicating possible myocardial injury.   Clinicians would have to utilize clinical acumen, EKG, Troponin, and serial changes to determine if it is an Acute Myocardial Infarction or myocardial injury due to an underlying chronic condition.         CBC & Differential [[id number]]  (Normal) Collected: 02/07/23 2252    Specimen: Blood Updated: 02/07/23 2330    Narrative:      The following orders were created for panel order CBC & Differential.  Procedure                               Abnormality         Status                     ---------                               -----------         ------                     CBC Auto Differential[[id number]]        Normal              Final result                 Please view results for these tests on the individual orders.    Comprehensive Metabolic Panel [[id number]]  (Abnormal) Collected: 02/07/23 2252    Specimen: Blood Updated: 02/08/23 0002     Glucose 141 mg/dL      BUN 15 mg/dL      Creatinine 0.99 mg/dL      Sodium 142 mmol/L      Potassium 3.4 mmol/L      Chloride 100 mmol/L      CO2 30.2 mmol/L      Calcium 10.3 mg/dL      Total Protein 7.8 g/dL      Albumin 4.7 g/dL      ALT (SGPT) 44 U/L      AST (SGOT) 35 U/L      Alkaline Phosphatase 99 U/L      Total Bilirubin 0.5 mg/dL      Globulin 3.1 gm/dL      A/G Ratio 1.5 g/dL      BUN/Creatinine Ratio 15.2     Anion Gap 11.8 mmol/L      eGFR 66.2 mL/min/1.73     Narrative:      GFR Normal >60  Chronic Kidney Disease <60  Kidney Failure <15      Lipase [[id number]]  (Normal) Collected: 02/07/23 2252    Specimen: Blood Updated: 02/08/23 0002     Lipase 24 U/L     BNP [[id number]]  (Normal) Collected: 02/07/23 2252    Specimen: Blood Updated: 02/08/23 0007     proBNP 83.8 pg/mL     Narrative:      Among patients with dyspnea, NT-proBNP is highly sensitive for the detection of acute congestive heart failure.  In addition NT-proBNP of <300 pg/ml effectively rules out acute congestive heart failure with 99% negative predictive value.    Results may be falsely decreased if patient taking Biotin.      Magnesium [[id number]]  (Normal) Collected: 02/07/23 2252    Specimen: Blood Updated: 02/08/23 0002     Magnesium 2.1 mg/dL     CBC Auto Differential [[id number]]  (Normal) Collected: 02/07/23 2252    Specimen: Blood Updated: 02/07/23 2330     WBC 7.76 10*3/mm3      RBC 4.78 10*6/mm3      Hemoglobin 14.8 g/dL      Hematocrit 44.7 %      MCV 93.5 fL      MCH 31.0 pg      MCHC 33.1 g/dL      RDW 13.9 %      RDW-SD 46.8 fl      MPV 10.1 fL      Platelets 257 10*3/mm3      Neutrophil % 67.5 %      Lymphocyte % 20.7 %      Monocyte % 7.3 %      Eosinophil % 3.4 %      Basophil % 0.8 %      Immature Grans % 0.3 %      Neutrophils, Absolute 5.24 10*3/mm3      Lymphocytes, Absolute 1.61 10*3/mm3      Monocytes, Absolute 0.57 10*3/mm3      Eosinophils, Absolute 0.26 10*3/mm3      Basophils, Absolute 0.06 10*3/mm3      Immature Grans, Absolute 0.02 10*3/mm3      nRBC 0.0 /100 WBC     High Sensitivity Troponin T 2Hr [[id number]] Collected: 02/08/23 0118    Specimen: Blood from Arm, Right Updated: 02/08/23 0154           Imaging:    XR Chest 1 View    Result Date: 2/7/2023  PROCEDURE: XR CHEST 1 VW  COMPARISON: [facility], , XR CHEST 1 VW, 1/04/2023, 20:44.  INDICATIONS: Chest Pain Triage Protocol/ ABNORMAL LABS  FINDINGS:  LUNGS: Normal.  No significant pulmonary parenchymal abnormalities.  VASCULATURE: Normal.  Unremarkable pulmonary vasculature.  CARDIAC: Normal.  No cardiac silhouette abnormality or cardiomegaly.  MEDIASTINUM: Normal.  No visible mass or adenopathy.  PLEURA: Normal.  No effusion or pleural thickening.  BONES: There are surgical changes in the neck and left shoulder. OTHER: Negative.        No acute cardiopulmonary process identified.       BLAIR MELENDEZ MD       Electronically Signed and Approved By:  BLAIR MELENDEZ MD on 2/07/2023 at 23:23                 Differential Diagnosis and Discussion:    Abdominal Pain: Based on the patient's signs and symptoms, I considered abdominal aortic aneurysm, small bowel obstruction, pancreatitis, acute cholecystitis, acute appendecitis, peptic ulcer disease, gastritis, colitis, endocrine disorders, irritable bowel syndrome and other differential diagnosis an etiology of the patient's abdominal pain.  Chest Pain:  Based on the patient's signs and symptoms, I considered aortic dissection, myocardial infaction, pulmonary embolism, cardiac tamponade, pericarditis, pneumothorax, musculoskeletal chest pain and other differential diagnosis as an etiology of the patient's chest pain.     All labs were reviewed and analyzed by me.  All X-rays were independently reviewed by me.  EKG was interpreted by supervising attending.    [facility]         Patient Care Considerations:    CONSULT: I considered consulting cardiology, however no chest pain, no EKG changes, hx and PE most consistent with chronic GERD with hx of gastric ulcers and barretts      Consultants/Shared Management Plan:    I have discussed the case with Dr. Campbell, supervising physician who states that the patient can be safely discharged with close follow up.    Social Determinants of Health:    Patient is independent, reliable, and has access to care.       Disposition and Care Coordination:    Discharged: The patient is suitable and stable for discharge with no need for consideration of observation or admission.    I have explained the patient´s condition, diagnoses and treatment plan based on the information available to me at this time. I have answered questions and addressed any concerns. The patient has a good  understanding of the patient´s diagnosis, condition, and treatment plan as can be expected at this point. The vital signs have been stable. The patient´s condition is stable and appropriate for discharge from the  emergency department.      The patient will pursue further outpatient evaluation with the primary care physician or other designated or consulting physician as outlined in the discharge instructions. They are agreeable to this plan of care and follow-up instructions have been explained in detail. The patient has received these instructions in written format and have expressed an understanding of the discharge instructions. The patient is aware that any significant change in condition or worsening of symptoms should prompt an immediate return to this or the closest emergency department or call to 911.  I have explained discharge medications and the need for follow up with the patient/caretakers. This was also printed in the discharge instructions. Patient was discharged with the following medications and follow up:      Medication List      Changed    fluticasone 50 MCG/ACT nasal spray  Commonly known as: FLONASE  USE 2 SPRAYS IN EACH NOSTRIL EVERY DAY . APPOINTMENT IS NEEDED BEFORE MORE REFILLS  What changed: See the new instructions.         Rafael Mi MD  [address] [place] [address] [phone]          Mike Amor MD  [address]  [facility] [id number] [phone]    Schedule an appointment as soon as possible for a visit       [facility] EMERGENCY ROOM  [address] [address] [phone]    If symptoms worsen       Final diagnoses:   Gastroesophageal reflux disease, unspecified whether esophagitis present   Elevated troponin        ED Disposition     ED Disposition   Discharge    Condition   Stable    Comment   --             This medical record created using voice recognition software.           Kaylyn Enciso PA-C  02/08/23 0206

## 2023-02-08 NOTE — DISCHARGE INSTRUCTIONS
Your troponin test has not elevated to concerning level.  EKG has not had any changes.  Your history and physical exam most consistent with your acid reflux with your history of gastric ulcers.  I would like for you to have close follow-up with your gastroenterologist.  Your primary care provider has written a prescription for Protonix which is an acid reducer medication.  Please list medication and take as prescribed.  Your primary care provider is also referred you to cardiology.  Please schedule follow-up appointment as discussed with your primary care provider.  Return to the emergency department with any severe worsening pain, shortness of breath, leg swelling or any worsening or concerning symptoms.

## 2023-02-09 NOTE — PROGRESS NOTES
Call pt:  U/S of lower abdomen showed no acute abnormalities.  Please follow-up with general surgery next week for the pain.  Go to the ER if pain worsens or you develop vomiting or fever.

## 2023-02-09 NOTE — TELEPHONE ENCOUNTER
I called pt just now and let her know that I had had cardiology review her case and that they want to see her on March 1, 2023.  I also let her know that I am going to repeat a troponin tomorrow to make sure that it is no longer elevated.  I told her that if her chest pains return, then she needs to go straight back to ER.  I also let her know that I would refer her to GI for her GERD also.  She agreed with this plan.

## 2023-02-13 NOTE — PROGRESS NOTES
Please send this result to Dr. Brewster's office in cardiology so they are aware.  I sent secure chat message to Dr. Brewster, but I do not know if he is in office today.  Thanks so much.  Also let patient know this.

## 2023-02-20 NOTE — PROGRESS NOTES
General Surgery/Colorectal Surgery Note    Patient Name:  Lidia Yeung  YOB: 1965  [id number]    Referring Provider: Rafael Mi MD      Patient Care Team:  Rafael Mi MD as PCP - General (Family Medicine)  Kaya Mills MD as Consulting Physician (General Surgery)    Chief complaint abdominal pain    Subjective .     History of present illness:    History of lower abdominal pain radiating to right lower quadrant and right lower extremity.  History of chronic back pain with multiple back surgeries including a surgery from the front to access her lower spine.  No blood in her stool.  No tobacco use.  Currently undergoing a cardiac work-up.    CT abdomen pelvis 1/4/2023 with small bilateral fat-containing inguinal hernia    History:  Past Medical History:   Diagnosis Date   • Asthma    • Benign essential hypertension    • BRCA gene mutation positive     TO HAVE ELECTIVE BILATERAL MASTECTOMY   • Chronic lower back pain    • Cluster headache    • Colon polyp     REMOVED   • Constipation    • Depression    • Diabetes mellitus (HCC)    • Difficulty walking     USES CANE SINCE BACK SURGERY 3/14/2022   • Disc degeneration, lumbar    • Elevated fasting glucose    • Elevated transaminase level     FATTY LIVER   • Fatigue    • GERD (gastroesophageal reflux disease)    • Hemorrhoids    • Hypothyroidism    • IBS (irritable bowel syndrome)    • Kidney stones    • Memory loss    • Migraine    • Seizures (HCC)     LAST 2020, GRAND MAL   • Stomach ulcer    • KULDIP (stress urinary incontinence, female)        Past Surgical History:   Procedure Laterality Date   • BREAST RECONSTRUCTION Bilateral 9/13/2022    Procedure: Bilateral breast reconstruction with placement of implants and mesh, use of spy;  Surgeon: Ashely Rodriguez MD;  [facility];  Service: Plastics;  Laterality: Bilateral;   • COLONOSCOPY     • ENDOSCOPY     • HYSTERECTOMY     • INTERSTIM PLACEMENT     • KIDNEY  STONE SURGERY      MULTIPLE   • LUMBAR DISC SURGERY      MULTIPLE, FUSION LUMBAR SPINE 3/14/22   • MASTECTOMY Bilateral 2022    Procedure: BREAST MASTECTOMY BILATERAL;  Surgeon: Kaya Mills MD;  Location: [facility] OR [facility];  Service: General;  Laterality: Bilateral;   • ORIF HUMERAL SHAFT FRACTURE      W/IMPLANTS       Family History   Problem Relation Age of Onset   • Brain cancer Mother    • Arthritis Mother    • Bleeding Disorder Mother    • Diabetes Mother    • Hypertension Mother    • Other Mother         malignant neoplasm   • Migraines Mother    • Thyroid disease Mother    • Arthritis Father    • Hypertension Father    • Other Father         malignant neoplasm   • Migraines Father    • Thyroid disease Father    • Breast cancer Maternal Aunt    • Arthritis Maternal Grandmother    • Diabetes Maternal Grandmother    • Malig Hyperthermia Neg Hx        Social History     Tobacco Use   • Smoking status: Former     Packs/day: 1.00     Years: 30.00     Pack years: 30.00     Types: Cigarettes     Quit date: 2016     Years since quittin.1   • Smokeless tobacco: Never   Vaping Use   • Vaping Use: Some days   • Substances: Flavoring   • Devices: Disposable, Pre-filled or refillable cartridge, Refillable tank, Pre-filled pod   • Passive vaping exposure: Yes   Substance Use Topics   • Alcohol use: Yes     Comment: occasionally   • Drug use: Defer       Review of Systems  All systems were reviewed and negative except for:   Review of Systems   Constitutional: Negative for chills, fever and unexpected weight loss.   HENT: Negative for congestion, nosebleeds and voice change.    Eyes: Negative for blurred vision, double vision and discharge.   Respiratory: Negative for apnea, chest tightness and shortness of breath.    Cardiovascular: Negative for chest pain and leg swelling.   Gastrointestinal:        See HPI   Endocrine: Negative for cold intolerance and heat intolerance.   Genitourinary: Negative for  dysuria, hematuria and urgency.   Musculoskeletal: Negative for back pain, joint swelling and neck pain.   Skin: Negative for color change and dry skin.   Neurological: Negative for dizziness and confusion.   Hematological: Negative for adenopathy.   Psychiatric/Behavioral: Negative for agitation and behavioral problems.     MEDS:  Prior to Admission medications    Medication Sig Start Date End Date Taking? Authorizing Provider   albuterol sulfate  (90 Base) MCG/ACT inhaler Inhale 2 puffs Every 4 (Four) Hours As Needed for Wheezing. 7/15/22  Yes Rafael Mi MD   ALPRAZolam (XANAX) 2 MG tablet Take 2 mg by mouth 3 (Three) Times a Day As Needed. 7/27/22  Yes Tana Avitia MD   amitriptyline (ELAVIL) 25 MG tablet Take 1 tablet by mouth Every Night. 7/15/22  Yes Rafael Mi MD   B Complex-Folic Acid (Super B Complex Maxi) tablet Take  by mouth.   Yes Tana Avitia MD   Biotin 72130 MCG tablet Take  by mouth.   Yes Tana Avitia MD   celecoxib (CeleBREX) 200 MG capsule Take 1 capsule by mouth Daily. 7/15/22  Yes Rafael Mi MD   dapagliflozin Propanediol (Farxiga) 10 MG tablet Take 10 mg by mouth Daily. 1/11/23  Yes Rafael Mi MD   DULoxetine (CYMBALTA) 60 MG capsule Take 1 capsule by mouth Daily. 7/15/22  Yes Rafael Mi MD   fluticasone (FLONASE) 50 MCG/ACT nasal spray USE 2 SPRAYS IN EACH NOSTRIL EVERY DAY . APPOINTMENT IS NEEDED BEFORE MORE REFILLS  Patient taking differently: 2 sprays into the nostril(s) as directed by provider Daily. 12/8/22  Yes Rafael Mi MD   furosemide (LASIX) 40 MG tablet TAKE 1 TABLET EVERY DAY 12/8/22  Yes Rafael Mi MD   hydroCHLOROthiazide (HYDRODIURIL) 25 MG tablet TAKE 1 TABLET EVERY DAY 1/4/23  Yes Mickie Samson DO   HYDROcodone-acetaminophen (Norco) 5-325 MG per tablet Take 1 tablet by mouth Every 8 (Eight) Hours As Needed for Moderate Pain. 2/7/23  Yes Rafael Mi MD    ipratropium-albuterol (DUO-NEB) 0.5-2.5 mg/mL nebulizer Ipratropium-Albuterol 0.5-2.5 (3) MG/3ML Inhalation Solution; Patient Sig: Ipratropium-Albuterol 0.5-2.5 (3) MG/3ML Inhalation Solution ; 360; 0; 20-Oct-2015; Active 10/20/15  Yes Tana Avitia MD   levETIRAcetam (KEPPRA) 750 MG tablet TAKE 1 TABLET TWICE DAILY 11/21/22  Yes Rafael Mi MD   levothyroxine (SYNTHROID, LEVOTHROID) 50 MCG tablet TAKE 1 TABLET EVERY DAY 1/30/23  Yes Rafael Mi MD   linaclotide (LINZESS) 290 MCG capsule capsule Take 1 capsule by mouth Every Morning Before Breakfast. 7/15/22  Yes Rafael Mi MD   lisinopril (PRINIVIL,ZESTRIL) 20 MG tablet Take 1 tablet by mouth Daily. 7/15/22  Yes Rafael Mi MD   metFORMIN ER (GLUCOPHAGE-XR) 500 MG 24 hr tablet Take 1 tablet by mouth Daily With Breakfast. 1/11/23  Yes Rafael Mi MD   Omega 3 1000 MG capsule Take 1,000 mg by mouth Daily.   Yes Tana Avitia MD   pantoprazole (Protonix) 40 MG EC tablet Take 1 tablet by mouth Daily. 2/7/23  Yes Rafael Mi MD   polyethylene glycol (MIRALAX) 17 GM/SCOOP powder Take 17 g by mouth.   Yes Tana Avitia MD   potassium chloride (K-DUR,KLOR-CON) 20 MEQ CR tablet Take 1 tablet by mouth 2 (Two) Times a Day. 7/15/22  Yes Rafael Mi MD   vitamin E 400 UNIT capsule Take 400 Units by mouth Daily.   Yes Tana Avitia MD        Allergies:  Atorvastatin, Atorvastatin calcium, Ciprofloxacin, Erythromycin, Fentanyl, Rosuvastatin, Statins, and Penicillins    Objective     Vital Signs        Physical Exam:     General Appearance:    Alert, cooperative, in no acute distress   Head:    Normocephalic, without obvious abnormality, atraumatic   Eyes:          Conjunctivae and sclerae normal, no icterus,     Ears:    Ears appear intact with no abnormalities noted   Throat:   No oral lesions, no thrush, oral mucosa moist   Neck:   No adenopathy, supple, trachea midline, no  "thyromegaly   Back:     No kyphosis present, no scoliosis present, no skin lesions,      erythema or scars, no tenderness to percussion or                   palpation,   range of motion normal   Lungs:     Clear to auscultation,respirations regular, even and                  unlabored    Heart:    Regular rhythm and normal rate, normal S1 and S2, no            murmur, no gallop, no rub, no click   Chest Wall:    No abnormalities observed   Abdomen:     Normal bowel sounds, no masses, no organomegaly, soft        non-tender, non-distended, no guarding, no rebound                tenderness, reducible bilateral inguinal hernias without evidence of obstruction or gangrene   Rectal:        Extremities:   Moves all extremities well, no edema, no cyanosis, no             redness   Pulses:   Pulses palpable and equal bilaterally   Skin:   No bleeding, bruising or rash   Lymph nodes:   No palpable adenopathy   Neurologic:   A/o x 4 with no deficits       Results Review:   {Results Review:26317::\"I reviewed the patient's new clinical results.\"    LABS/IMAGING:  Results for orders placed or performed in visit on 02/10/23   Troponin    Specimen: Blood   Result Value Ref Range    HS Troponin T 15 (H) <10 ng/L        Result Review :     Assessment & Plan     Initial reducible bilateral inguinal hernias without evidence of obstruction or gangrene  Chronic pain likely related to multiple back surgeries     I reviewed the CT images with the patient and family.  I discussed the warning signs of incarceration and strangulation.  She was instructed to go the emergency department for any concern.  I explained her I think her pain is likely related to her back surgeries and not the inguinal hernias.  I do not recommend surgery at this time.  They agree to proceed with observation.  I offered referral to pain management.  Follow-up with me as needed.  All questions answered.  They agree with the plan.        This document has been " electronically signed by Bayron Gibson MD  February 20, 2023 07:48 EST

## 2023-02-24 PROBLEM — I46.9 CARDIAC ARREST (HCC): Status: ACTIVE | Noted: 2023-01-01

## 2025-02-04 NOTE — H&P
History and Physical      Patient Name: Lidia Yeung   Patient ID: [id number]   Sex: Female   YOB: 1965    Primary Care Provider: Rafael Mi MD   Referring Provider: Felisha PETTIT    Visit Date: July 29, 2020    Provider: HODAN Cardoso   Location: [facility]   Location Address: [address] [place] [address]  [id number]   Location Phone: [phone]          Chief Complaint  · Follow Up Post Surgery      History Of Present Illness  Lidia Yeung is a 54 year old /White female who is here to follow up status post surgery.      Patient presents today for follow-up visit after undergoing an excision of 2 back masses on 7/17/2020 performed by Dr. Jay Granado.  Patient was with epidermal inclusion cyst of the left lateral back mass and a hemangioma of the medial back per pathology.    Patient reports that 1 of her incisions has opened.  Reports her  just taped a Band-Aid over it.  Reports and what she describes a lot of purulent drainage out of it.       Past Medical History  Disease Name Date Onset Notes   Anxiety --  --    Bladder Disorder --  --    Blood Clotting Disorder --  --    Bunion --  --    Carpal tunnel syndrome --  --    Chronic Sinusitis --  --    Corns and callus --  --    Depression --  --    Diabetes --  --    Foot pain, right 05/08/2018 --    GERD --  --    Heel pain --  --    Hyperlipemia --  --    Hyperlipidemia --  --    Hypertension --  --    Hypothyroid --  --    IBS (irritable bowel syndrome) --  --    Kidney stones --  --    Limb Swelling --  --    Migraines --  --    Numbness in feet --  --    Rectal bleeding --  --    Reflux --  --    Seasonal allergies --  --    Seizure disorder --  --    Type 2 diabetes mellitus --  --          Past Surgical History  Procedure Name Date Notes   Arm Surgery 2014 Fx in 2 areas   Back --  --    Back surgery 2012 --    C section 1987 --    Carpal tunnel surgery 2002 Right wrist   Cesarian Section --   --    Hysterectomy --  --    Metal implants --  --    Neck surgery 2005 Titatium plate   Sinus Surgery --  --          Medication List  Name Date Started Instructions   alprazolam 2 mg oral tablet  take 1 tablet (2 mg) by oral route 3 times per day   Amitiza 24 mcg oral capsule  take 1 capsule (24 mcg) by oral route 2 times per day with food and water   biotin 5,000 mcg oral tablet,disintegrating  dissolve 1 tablet by oral route 2 times a day   calcium carbonate 600 mg calcium (1,500 mg) oral tablet  --    Coreg 3.125 mg oral tablet 03/31/2020 Take 1 tablet by mouth twice daily   Farxiga 10 mg oral tablet  take 1 tablet (10 mg) by oral route once daily in the morning   fenofibrate 54 mg oral tablet  --    ferrous sulfate 325 mg (65 mg iron) oral tablet,delayed release (DR/EC)  --    furosemide 40 mg oral tablet  take 1 tablet (40 mg) by oral route once daily   lamotrigine 25 mg oral tablet  take 2 tablets (50 mg) by oral route 2 times per day   levetiracetam 750 mg oral tablet  take 1 tablet (750 mg) by oral route 2 times per day   levothyroxine 50 mcg oral tablet  take 1 tablet (50 mcg) by oral route once daily   lisinopril 10 mg oral tablet  take 1 tablet (10 mg) by oral route once daily   Livalo 4 mg oral tablet  take 1 tablet (4 mg) by oral route once daily   metformin 500 mg oral tablet  take 1 tablet (500 mg) by oral route 2 times per day with morning and evening meals   montelukast 10 mg oral tablet  take 1 tablet (10 mg) by oral route once daily in the evening   Omega 3-6-9 Complex 400-400-400 mg oral capsule  take 1 capsule by oral route daily   pantoprazole 40 mg oral tablet,delayed release (DR/EC)  take 1 tablet (40 mg) by oral route once daily   potassium chloride 20 mEq oral tablet extended release  take 1 tablet (20 meq) by oral route 3 times per day with food   vitamin B complex oral capsule  --    vitamin E 100 unit oral capsule  --    Xopenex HFA 45 mcg/actuation inhalation HFA aerosol inhaler   "inhale 2 puffs by inhalation route every 8 hours as needed         Allergy List  Allergen Name Date Reaction Notes   Duragesic --  --  --    erythromycin --  --  --    STATINS-HMG-COA REDUCTASE INHIBITORS --  --  --        Allergies Reconciled  Family Medical History  Disease Name Relative/Age Notes   Depression  --    Stroke  Maternal grandparent   Heart Disease Brother/   Maternal grandparent Paternal grandparent   Breast Neoplasm Sister/   --    Bone Neoplasm, Malignant  Paternal grandparent   Diabetes Aunt/  Brother/  Mother/  Sister/  Uncle/   Maternal grandparent   Alcoholism in family  --    Arthrtis  --          Social History  Finding Status Start/Stop Quantity Notes   Alcohol Never --/-- --  --    Alcohol Use Current some day --/-- --  rarely drinks   Claustophobic Unknown --/-- --  yes   lives with spouse --  --/-- --  --    . --  --/-- --  --    Recreational Drug Use Never --/-- --  no   Retired. --  --/-- --  --    Tobacco Former --/-- --  former smoker  Continues to vape daily         Review of Systems  · Constitutional  o Denies  o : chills, fever  · Eyes  o Denies  o : yellowish discoloration of eyes  · HENT  o Denies  o : difficulty swallowing  · Cardiovascular  o Denies  o : chest pain on exertion  · Respiratory  o Denies  o : shortness of breath  · Genitourinary  o Denies  o : abnormal color of urine  · Integument  o Denies  o : rash  · Neurologic  o Denies  o : tingling or numbness  · Musculoskeletal  o Denies  o : joint pain  · Endocrine  o Denies  o : weight gain, weight loss      Vitals  Date Time BP Position Site L\R Cuff Size HR RR TEMP (F) WT  HT  BMI kg/m2 BSA m2 O2 Sat        07/29/2020 01:34 PM       12  205lbs 16oz 5'  5\" 34.28 2.07           Physical Examination  · Constitutional  o Appearance  o : well developed, well-nourished, patient in no apparent distress  · Head and Face  o Head  o :   § Inspection  § : atraumatic, normocephalic  o Face  o :   § Inspection  § : no " facial lesions  · Eyes  o Conjunctivae  o : conjunctivae normal  o Sclerae  o : sclerae white  · Neck  o Inspection/Palpation  o : normal appearance, no masses or tenderness, trachea midline  · Respiratory  o Respiratory Effort  o : breathing unlabored  · Skin and Subcutaneous Tissue  o General Inspection  o : no lesions present, no areas of discoloration, skin turgor normal, texture normal  · Neurologic  o Mental Status Examination  o :   § Orientation  § : grossly oriented to person, place and time  § Attention  § : attention normal, concentration abilities normal  § Fund of Knowledge  § : fund of knowledge within normal limits, patient aware of current events  o Gait and Station  o : normal gait, able to stand without difficulty  · Psychiatric  o Judgement and Insight  o : judgment and insight intact  o Mood and Affect  o : mood normal, affect appropriate     Back: Medial incision is open 3 cm in length and 1 cm in depth.  That wound was packed today in clinic.  Left lateral incision is draining some purulent drainage.  I have cultured this wound.           Assessment  · Postoperative Exam Following Surgery     V67.00/Z09  · Wound dehiscence     998.30/T81.30XA      Plan  · Orders  o Wound Culture with Sensitivities if indicated [facility] ([id number]) - V67.00/Z09, 998.30/T81.30XA - 07/29/2020  · Medications  o Medications have been Reconciled  o Transition of Care or Provider Policy  · Instructions  o Follow-up in 1 week with Dr. Granado  o wound culture with sensitivitie  o ibuprofen for pain as needed  o Electronically Identified Patient Education Materials Provided Electronically            Electronically Signed by: HODAN Cardoso -Author on July 29, 2020 02:07:17 PM   7.1

## (undated) DEVICE — INTENDED FOR TISSUE SEPARATION, AND OTHER PROCEDURES THAT REQUIRE A SHARP SURGICAL BLADE TO PUNCTURE OR CUT.: Brand: BARD-PARKER ® CARBON RIB-BACK BLADES

## (undated) DEVICE — PLASTIC MAJOR-LF: Brand: MEDLINE INDUSTRIES, INC.

## (undated) DEVICE — KT DRP SPY/PHI W/ICG 25MG STRL

## (undated) DEVICE — SUT PERMAHAND SILK 0 FSL 30IN BLK

## (undated) DEVICE — ANTIBACTERIAL UNDYED BRAIDED (POLYGLACTIN 910), SYNTHETIC ABSORBABLE SUTURE: Brand: COATED VICRYL

## (undated) DEVICE — PENCL E/S SMOKEEVAC W/TELESCP CANN

## (undated) DEVICE — SOL IRR NACL 0.9PCT BT 1000ML

## (undated) DEVICE — ELECTRD BLD EDGE COAT 3IN

## (undated) DEVICE — CARDINAL HEALTH LAP SPONGE  4 X 18 IN. PREWASHED X RAY DETECTABLE SOFTPACK: Brand: CARDINAL HEALTH

## (undated) DEVICE — JACKSON-PRATT 100CC BULB RESERVOIR: Brand: CARDINAL HEALTH

## (undated) DEVICE — GLV SURG SENSICARE PI PF LF 7 GRN STRL

## (undated) DEVICE — DRSNG SURG AQUACEL AG/ADVNTGE 9X25CM 3.5X10IN

## (undated) DEVICE — PACK,UNIVERSAL,NO GOWNS: Brand: MEDLINE

## (undated) DEVICE — SLV SCD KN/LEN ADJ EXPRSS BLENDED MD 1P/U

## (undated) DEVICE — ANTIBACTERIAL VIOLET BRAIDED (POLYGLACTIN 910), SYNTHETIC ABSORBABLE SUTURE: Brand: COATED VICRYL

## (undated) DEVICE — MARKR SKIN W/RULR AND LBL

## (undated) DEVICE — STERILE POLYISOPRENE POWDER-FREE SURGICAL GLOVES WITH EMOLLIENT COATING: Brand: PROTEXIS

## (undated) DEVICE — NON-WOVEN ADHESIVE WOUND DRESSING: Brand: PRIMAPORE ADHESIVE DRESSING 10*8CM

## (undated) DEVICE — SZR BRST RND H/PROJ 535CC

## (undated) DEVICE — BIOPATCH™ ANTIMICROBIAL DRESSING WITH CHLORHEXIDINE GLUCONATE IS A HYDROPHILLIC POLYURETHANE ABSORPTIVE FOAM WITH CHLORHEXIDINE GLUCONATE (CHG) WHICH INHIBITS BACTERIAL GROWTH UNDER THE DRESSING. THE DRESSING IS INTENDED TO BE USED TO ABSORB EXUDATE, COVER A WOUND CAUSED BY VASCULAR AND NONVASCULAR PERCUTANEOUS MEDICAL DEVICES DURING SURGERY, AS WELL AS REDUCE LOCAL INFECTION AND COLONIZATION OF MICROORGANISMS.: Brand: BIOPATCH

## (undated) DEVICE — BLAKE SILICONE DRAIN, 15 FR ROUND, HUBLESS WITH 3/16" TROCAR: Brand: BLAKE

## (undated) DEVICE — CVR HNDL LT SURG ACCSSRY BLU STRL

## (undated) DEVICE — PROXIMATE RH ROTATING HEAD SKIN STAPLERS (35 WIDE) CONTAINS 35 STAINLESS STEEL STAPLES: Brand: PROXIMATE

## (undated) DEVICE — SYS CLS SKIN PREMIERPRO EXOFINFUSION 22CM

## (undated) DEVICE — SUT VIC 3/0 SH 27IN J416H

## (undated) DEVICE — GLV SURG SENSICARE SLT PF LF 6.5 STRL

## (undated) DEVICE — GLV SURG SENSICARE SLT PF LF 5.5 STRL

## (undated) DEVICE — SPNG LAP PREWSH SFTPK 18X18IN STRL PK/5